# Patient Record
Sex: FEMALE | URBAN - METROPOLITAN AREA
[De-identification: names, ages, dates, MRNs, and addresses within clinical notes are randomized per-mention and may not be internally consistent; named-entity substitution may affect disease eponyms.]

---

## 2022-07-25 ENCOUNTER — INPATIENT HOSPITAL (OUTPATIENT)
Dept: URBAN - METROPOLITAN AREA HOSPITAL 104 | Facility: HOSPITAL | Age: 72
End: 2022-07-25
Payer: MEDICARE

## 2022-07-25 DIAGNOSIS — D50.0 IRON DEFICIENCY ANEMIA SECONDARY TO BLOOD LOSS (CHRONIC): ICD-10-CM

## 2022-07-25 DIAGNOSIS — K59.00 CONSTIPATION, UNSPECIFIED: ICD-10-CM

## 2022-07-25 DIAGNOSIS — K92.0 HEMATEMESIS: ICD-10-CM

## 2022-07-25 DIAGNOSIS — R93.3 ABNORMAL FINDINGS ON DIAGNOSTIC IMAGING OF OTHER PARTS OF DI: ICD-10-CM

## 2022-07-25 PROCEDURE — 99222 1ST HOSP IP/OBS MODERATE 55: CPT | Performed by: INTERNAL MEDICINE

## 2022-11-29 ENCOUNTER — INPATIENT HOSPITAL (OUTPATIENT)
Dept: URBAN - METROPOLITAN AREA HOSPITAL 56 | Facility: HOSPITAL | Age: 72
End: 2022-11-29
Payer: MEDICARE

## 2022-11-29 DIAGNOSIS — Z98.84 BARIATRIC SURGERY STATUS: ICD-10-CM

## 2022-11-29 DIAGNOSIS — K92.0 HEMATEMESIS: ICD-10-CM

## 2022-11-29 DIAGNOSIS — K31.1 ADULT HYPERTROPHIC PYLORIC STENOSIS: ICD-10-CM

## 2022-11-29 DIAGNOSIS — K22.10 ULCER OF ESOPHAGUS WITHOUT BLEEDING: ICD-10-CM

## 2022-11-29 PROCEDURE — 99222 1ST HOSP IP/OBS MODERATE 55: CPT | Mod: 25 | Performed by: INTERNAL MEDICINE

## 2022-11-29 PROCEDURE — 43245 EGD DILATE STRICTURE: CPT | Performed by: INTERNAL MEDICINE

## 2022-11-30 ENCOUNTER — INPATIENT HOSPITAL (OUTPATIENT)
Dept: URBAN - METROPOLITAN AREA HOSPITAL 56 | Facility: HOSPITAL | Age: 72
End: 2022-11-30
Payer: MEDICARE

## 2022-11-30 DIAGNOSIS — K31.1 ADULT HYPERTROPHIC PYLORIC STENOSIS: ICD-10-CM

## 2022-11-30 DIAGNOSIS — Z98.84 BARIATRIC SURGERY STATUS: ICD-10-CM

## 2022-11-30 DIAGNOSIS — K22.10 ULCER OF ESOPHAGUS WITHOUT BLEEDING: ICD-10-CM

## 2022-11-30 DIAGNOSIS — D50.0 IRON DEFICIENCY ANEMIA SECONDARY TO BLOOD LOSS (CHRONIC): ICD-10-CM

## 2022-11-30 PROCEDURE — 99232 SBSQ HOSP IP/OBS MODERATE 35: CPT | Performed by: PHYSICIAN ASSISTANT

## 2022-12-01 PROCEDURE — 99232 SBSQ HOSP IP/OBS MODERATE 35: CPT | Performed by: PHYSICIAN ASSISTANT

## 2022-12-02 ENCOUNTER — INPATIENT HOSPITAL (OUTPATIENT)
Dept: URBAN - METROPOLITAN AREA HOSPITAL 56 | Facility: HOSPITAL | Age: 72
End: 2022-12-02
Payer: MEDICARE

## 2022-12-02 DIAGNOSIS — Z98.84 BARIATRIC SURGERY STATUS: ICD-10-CM

## 2022-12-02 DIAGNOSIS — K22.10 ULCER OF ESOPHAGUS WITHOUT BLEEDING: ICD-10-CM

## 2022-12-02 DIAGNOSIS — D50.0 IRON DEFICIENCY ANEMIA SECONDARY TO BLOOD LOSS (CHRONIC): ICD-10-CM

## 2022-12-02 DIAGNOSIS — K31.1 ADULT HYPERTROPHIC PYLORIC STENOSIS: ICD-10-CM

## 2022-12-02 PROCEDURE — 99232 SBSQ HOSP IP/OBS MODERATE 35: CPT | Performed by: PHYSICIAN ASSISTANT

## 2023-01-01 ENCOUNTER — APPOINTMENT (OUTPATIENT)
Dept: GENERAL RADIOLOGY | Age: 73
DRG: 871 | End: 2023-01-01
Payer: MEDICARE

## 2023-01-01 ENCOUNTER — APPOINTMENT (OUTPATIENT)
Dept: CT IMAGING | Age: 73
DRG: 871 | End: 2023-01-01
Payer: MEDICARE

## 2023-01-01 ENCOUNTER — HOSPITAL ENCOUNTER (INPATIENT)
Age: 73
LOS: 4 days | DRG: 871 | End: 2023-01-14
Attending: EMERGENCY MEDICINE
Payer: MEDICARE

## 2023-01-01 ENCOUNTER — APPOINTMENT (OUTPATIENT)
Dept: ULTRASOUND IMAGING | Age: 73
DRG: 871 | End: 2023-01-01
Payer: MEDICARE

## 2023-01-01 VITALS
OXYGEN SATURATION: 65 % | TEMPERATURE: 99.9 F | HEIGHT: 64 IN | SYSTOLIC BLOOD PRESSURE: 102 MMHG | BODY MASS INDEX: 21.17 KG/M2 | HEART RATE: 84 BPM | RESPIRATION RATE: 9 BRPM | WEIGHT: 124 LBS | DIASTOLIC BLOOD PRESSURE: 68 MMHG

## 2023-01-01 DIAGNOSIS — K92.2 UPPER GI BLEED: Primary | ICD-10-CM

## 2023-01-01 DIAGNOSIS — E87.20 METABOLIC ACIDOSIS: ICD-10-CM

## 2023-01-01 DIAGNOSIS — R65.10 SIRS (SYSTEMIC INFLAMMATORY RESPONSE SYNDROME) (HCC): ICD-10-CM

## 2023-01-01 DIAGNOSIS — J90 PLEURAL EFFUSION, BILATERAL: ICD-10-CM

## 2023-01-01 DIAGNOSIS — K31.89 GASTRIC MASS: ICD-10-CM

## 2023-01-01 DIAGNOSIS — R09.02 HYPOXIA: ICD-10-CM

## 2023-01-01 DIAGNOSIS — J90 PLEURAL EFFUSION: ICD-10-CM

## 2023-01-01 DIAGNOSIS — I48.91 ATRIAL FIBRILLATION WITH RAPID VENTRICULAR RESPONSE (HCC): ICD-10-CM

## 2023-01-01 LAB
ABO/RH: NORMAL
ABO/RH: NORMAL
ACANTHOCYTES: ABNORMAL
ADENOVIRUS DETECTION BY PCR: NOT DETECTED
ALBUMIN SERPL-MCNC: 1.9 GM/DL (ref 3.4–5)
ALBUMIN SERPL-MCNC: 2 GM/DL (ref 3.4–5)
ALBUMIN SERPL-MCNC: 2.5 GM/DL (ref 3.4–5)
ALBUMIN SERPL-MCNC: 2.9 GM/DL (ref 3.4–5)
ALBUMIN SERPL-MCNC: 3 GM/DL (ref 3.4–5)
ALP BLD-CCNC: 103 IU/L (ref 40–129)
ALP BLD-CCNC: 121 IU/L (ref 40–129)
ALP BLD-CCNC: 72 IU/L (ref 40–128)
ALP BLD-CCNC: 73 IU/L (ref 40–128)
ALP BLD-CCNC: 84 IU/L (ref 40–128)
ALT SERPL-CCNC: 29 U/L (ref 10–40)
ALT SERPL-CCNC: 37 U/L (ref 10–40)
ALT SERPL-CCNC: 38 U/L (ref 10–40)
ALT SERPL-CCNC: 564 U/L (ref 10–40)
ALT SERPL-CCNC: 81 U/L (ref 10–40)
AMORPHOUS: ABNORMAL /HPF
AMYLASE: 105 U/L (ref 25–115)
AMYLASE: 56 U/L (ref 25–115)
ANION GAP SERPL CALCULATED.3IONS-SCNC: 15 MMOL/L (ref 4–16)
ANION GAP SERPL CALCULATED.3IONS-SCNC: 21 MMOL/L (ref 4–16)
ANION GAP SERPL CALCULATED.3IONS-SCNC: 24 MMOL/L (ref 4–16)
ANION GAP SERPL CALCULATED.3IONS-SCNC: 24 MMOL/L (ref 4–16)
ANION GAP SERPL CALCULATED.3IONS-SCNC: 27 MMOL/L (ref 4–16)
ANION GAP SERPL CALCULATED.3IONS-SCNC: 28 MMOL/L (ref 4–16)
ANION GAP SERPL CALCULATED.3IONS-SCNC: 31 MMOL/L (ref 4–16)
ANISOCYTOSIS: ABNORMAL
ANTIBODY SCREEN: NEGATIVE
ANTIBODY SCREEN: NEGATIVE
APTT: 25.2 SECONDS (ref 25.1–37.1)
APTT: 29.9 SECONDS (ref 25.1–37.1)
APTT: 32.6 SECONDS (ref 25.1–37.1)
APTT: 43.6 SECONDS (ref 25.1–37.1)
APTT: 44 SECONDS (ref 25.1–37.1)
AST SERPL-CCNC: 197 IU/L (ref 15–37)
AST SERPL-CCNC: 2004 IU/L (ref 15–37)
AST SERPL-CCNC: 26 IU/L (ref 15–37)
AST SERPL-CCNC: 44 IU/L (ref 15–37)
AST SERPL-CCNC: 71 IU/L (ref 15–37)
BACTERIA: ABNORMAL /HPF
BACTERIA: NEGATIVE /HPF
BANDED NEUTROPHILS ABSOLUTE COUNT: 2.58 K/CU MM
BANDED NEUTROPHILS ABSOLUTE COUNT: 5.33 K/CU MM
BANDED NEUTROPHILS RELATIVE PERCENT: 14 % (ref 5–11)
BANDED NEUTROPHILS RELATIVE PERCENT: 23 % (ref 5–11)
BASE EXCESS MIXED: 11 (ref 0–2.3)
BASE EXCESS MIXED: 12.1 (ref 0–2.3)
BASE EXCESS MIXED: 12.7 (ref 0–2.3)
BASE EXCESS MIXED: 12.8 (ref 0–2.3)
BASE EXCESS MIXED: 4.5 (ref 0–2.3)
BASE EXCESS MIXED: 8.4 (ref 0–2.3)
BASE EXCESS MIXED: 9.2 (ref 0–2.3)
BASE EXCESS: 10 (ref 0–2.4)
BASE EXCESS: 11 (ref 0–2.4)
BASE EXCESS: 12 (ref 0–2.4)
BASE EXCESS: 14 (ref 0–2.4)
BASE EXCESS: 17 (ref 0–2.4)
BASE EXCESS: 3 (ref 0–2.4)
BASE EXCESS: 9 (ref 0–2.4)
BASE EXCESS: ABNORMAL (ref 0–2.4)
BASOPHILS ABSOLUTE: 0 K/CU MM
BASOPHILS ABSOLUTE: 0 K/CU MM
BASOPHILS RELATIVE PERCENT: 0.1 % (ref 0–1)
BASOPHILS RELATIVE PERCENT: 0.1 % (ref 0–1)
BILIRUB SERPL-MCNC: 0.4 MG/DL (ref 0–1)
BILIRUB SERPL-MCNC: 0.4 MG/DL (ref 0–1)
BILIRUB SERPL-MCNC: 0.5 MG/DL (ref 0–1)
BILIRUB SERPL-MCNC: 0.6 MG/DL (ref 0–1)
BILIRUB SERPL-MCNC: 1.4 MG/DL (ref 0–1)
BILIRUBIN DIRECT: 0.4 MG/DL (ref 0–0.3)
BILIRUBIN DIRECT: 1.1 MG/DL (ref 0–0.3)
BILIRUBIN URINE: ABNORMAL MG/DL
BILIRUBIN URINE: NEGATIVE MG/DL
BILIRUBIN, INDIRECT: 0.2 MG/DL (ref 0–0.7)
BILIRUBIN, INDIRECT: 0.3 MG/DL (ref 0–0.7)
BLOOD, URINE: ABNORMAL
BLOOD, URINE: ABNORMAL
BORDETELLA PARAPERTUSSIS BY PCR: NOT DETECTED
BORDETELLA PERTUSSIS PCR: NOT DETECTED
BUN BLDV-MCNC: 13 MG/DL (ref 6–23)
BUN BLDV-MCNC: 15 MG/DL (ref 6–23)
BUN BLDV-MCNC: 15 MG/DL (ref 6–23)
BUN BLDV-MCNC: 4 MG/DL (ref 6–23)
BUN BLDV-MCNC: 4 MG/DL (ref 6–23)
BUN BLDV-MCNC: 5 MG/DL (ref 6–23)
BUN BLDV-MCNC: 8 MG/DL (ref 6–23)
BURR CELLS: ABNORMAL
C-REACTIVE PROTEIN, HIGH SENSITIVITY: 105 MG/L
C-REACTIVE PROTEIN, HIGH SENSITIVITY: 152.7 MG/L
CALCIUM OXALATE CRYSTALS: ABNORMAL /HPF
CALCIUM SERPL-MCNC: 7.3 MG/DL (ref 8.3–10.6)
CALCIUM SERPL-MCNC: 7.7 MG/DL (ref 8.3–10.6)
CALCIUM SERPL-MCNC: 8.5 MG/DL (ref 8.3–10.6)
CALCIUM SERPL-MCNC: 8.7 MG/DL (ref 8.3–10.6)
CALCIUM SERPL-MCNC: 8.9 MG/DL (ref 8.3–10.6)
CALCIUM SERPL-MCNC: 8.9 MG/DL (ref 8.3–10.6)
CALCIUM SERPL-MCNC: 9.7 MG/DL (ref 8.3–10.6)
CARBON MONOXIDE, BLOOD: 1.4 % (ref 0–5)
CARBON MONOXIDE, BLOOD: 1.8 % (ref 0–5)
CARBON MONOXIDE, BLOOD: 2.1 % (ref 0–5)
CARBON MONOXIDE, BLOOD: 2.5 % (ref 0–5)
CARBON MONOXIDE, BLOOD: 2.5 % (ref 0–5)
CHLAMYDOPHILA PNEUMONIA PCR: NOT DETECTED
CHLORIDE BLD-SCNC: 100 MMOL/L (ref 99–110)
CHLORIDE BLD-SCNC: 101 MMOL/L (ref 99–110)
CHLORIDE BLD-SCNC: 107 MMOL/L (ref 99–110)
CHLORIDE BLD-SCNC: 108 MMOL/L (ref 99–110)
CHLORIDE BLD-SCNC: 111 MMOL/L (ref 99–110)
CHLORIDE BLD-SCNC: 94 MMOL/L (ref 99–110)
CHLORIDE BLD-SCNC: 98 MMOL/L (ref 99–110)
CLARITY: ABNORMAL
CLARITY: ABNORMAL
CO2 CONTENT: 12.5 MMOL/L (ref 19–24)
CO2 CONTENT: 15.3 MMOL/L (ref 19–24)
CO2 CONTENT: 15.7 MMOL/L (ref 19–24)
CO2 CONTENT: 15.9 MMOL/L (ref 19–24)
CO2 CONTENT: 16 MMOL/L (ref 19–24)
CO2 CONTENT: 16.2 MMOL/L (ref 19–24)
CO2 CONTENT: 19.8 MMOL/L (ref 19–24)
CO2: 11 MMOL/L (ref 21–32)
CO2: 15 MMOL/L (ref 21–32)
CO2: 15 MMOL/L (ref 21–32)
CO2: 17 MMOL/L (ref 21–32)
CO2: 17 MMOL/L (ref 21–32)
CO2: 18 MMOL/L (ref 21–32)
CO2: 19 MMOL/L (ref 21–32)
CO2: 19 MMOL/L (ref 21–32)
CO2: 21 MMOL/L (ref 21–32)
CO2: 9 MMOL/L (ref 21–32)
COLOR: YELLOW
COLOR: YELLOW
COMMENT: ABNORMAL
CORONAVIRUS 229E PCR: NOT DETECTED
CORONAVIRUS HKU1 PCR: NOT DETECTED
CORONAVIRUS NL63 PCR: NOT DETECTED
CORONAVIRUS OC43 PCR: NOT DETECTED
CREAT SERPL-MCNC: 0.5 MG/DL (ref 0.6–1.1)
CREAT SERPL-MCNC: 0.6 MG/DL (ref 0.6–1.1)
CREAT SERPL-MCNC: 0.9 MG/DL (ref 0.6–1.1)
CREAT SERPL-MCNC: 1 MG/DL (ref 0.6–1.1)
CREAT SERPL-MCNC: 1.4 MG/DL (ref 0.6–1.1)
CULTURE: ABNORMAL
CULTURE: NORMAL
DIFFERENTIAL TYPE: ABNORMAL
DOHLE BODIES: PRESENT
DOSE AMOUNT: NORMAL
DOSE TIME: NORMAL
EGFR, POC: 29 ML/MIN/1.73M2
EGFR, POC: 37 ML/MIN/1.73M2
EGFR, POC: 53 ML/MIN/1.73M2
EGFR, POC: 59 ML/MIN/1.73M2
EGFR, POC: ABNORMAL ML/MIN/1.73M2
EKG ATRIAL RATE: 300 BPM
EKG DIAGNOSIS: NORMAL
EKG DIAGNOSIS: NORMAL
EKG Q-T INTERVAL: 326 MS
EKG Q-T INTERVAL: 334 MS
EKG QRS DURATION: 84 MS
EKG QRS DURATION: 92 MS
EKG QTC CALCULATION (BAZETT): 493 MS
EKG QTC CALCULATION (BAZETT): 513 MS
EKG R AXIS: -41 DEGREES
EKG R AXIS: 215 DEGREES
EKG T AXIS: 150 DEGREES
EKG T AXIS: 202 DEGREES
EKG VENTRICULAR RATE: 131 BPM
EKG VENTRICULAR RATE: 149 BPM
EOSINOPHILS ABSOLUTE: 0 K/CU MM
EOSINOPHILS ABSOLUTE: 0 K/CU MM
EOSINOPHILS RELATIVE PERCENT: 0 % (ref 0–3)
EOSINOPHILS RELATIVE PERCENT: 0 % (ref 0–3)
ERYTHROCYTE SEDIMENTATION RATE: 3 MM/HR (ref 0–30)
FLUID TYPE: NORMAL INDEX
FLUID TYPE: NORMAL INDEX
GFR SERPL CREATININE-BSD FRML MDRD: 40 ML/MIN/1.73M2
GFR SERPL CREATININE-BSD FRML MDRD: 59 ML/MIN/1.73M2
GFR SERPL CREATININE-BSD FRML MDRD: >60 ML/MIN/1.73M2
GLUCOSE BLD-MCNC: 109 MG/DL (ref 70–99)
GLUCOSE BLD-MCNC: 111 MG/DL (ref 70–99)
GLUCOSE BLD-MCNC: 112 MG/DL (ref 70–99)
GLUCOSE BLD-MCNC: 112 MG/DL (ref 70–99)
GLUCOSE BLD-MCNC: 115 MG/DL (ref 70–99)
GLUCOSE BLD-MCNC: 117 MG/DL (ref 70–99)
GLUCOSE BLD-MCNC: 119 MG/DL (ref 70–99)
GLUCOSE BLD-MCNC: 119 MG/DL (ref 70–99)
GLUCOSE BLD-MCNC: 121 MG/DL (ref 70–99)
GLUCOSE BLD-MCNC: 123 MG/DL (ref 70–99)
GLUCOSE BLD-MCNC: 130 MG/DL (ref 70–99)
GLUCOSE BLD-MCNC: 143 MG/DL (ref 70–99)
GLUCOSE BLD-MCNC: 146 MG/DL (ref 70–99)
GLUCOSE BLD-MCNC: 179 MG/DL (ref 70–99)
GLUCOSE BLD-MCNC: 180 MG/DL (ref 70–99)
GLUCOSE BLD-MCNC: 198 MG/DL (ref 70–99)
GLUCOSE BLD-MCNC: 74 MG/DL (ref 70–99)
GLUCOSE BLD-MCNC: 76 MG/DL (ref 70–99)
GLUCOSE, FLUID: 153 MG/DL
GLUCOSE, FLUID: 157 MG/DL
GLUCOSE, URINE: NEGATIVE MG/DL
GLUCOSE, URINE: NEGATIVE MG/DL
HCO3 ARTERIAL: 11.4 MMOL/L (ref 18–23)
HCO3 ARTERIAL: 14.2 MMOL/L (ref 18–23)
HCO3 ARTERIAL: 14.6 MMOL/L (ref 18–23)
HCO3 ARTERIAL: 14.7 MMOL/L (ref 18–23)
HCO3 ARTERIAL: 15.1 MMOL/L (ref 18–23)
HCO3 ARTERIAL: 15.2 MMOL/L (ref 18–23)
HCO3 ARTERIAL: 15.3 MMOL/L (ref 18–23)
HCO3 ARTERIAL: 15.3 MMOL/L (ref 18–23)
HCO3 ARTERIAL: 16.3 MMOL/L (ref 18–23)
HCO3 ARTERIAL: 17.5 MMOL/L (ref 18–23)
HCO3 ARTERIAL: 17.8 MMOL/L (ref 18–23)
HCO3 ARTERIAL: 19 MMOL/L (ref 18–23)
HCO3 VENOUS: 11.3 MMOL/L (ref 19–25)
HCO3 VENOUS: 21.3 MMOL/L (ref 19–25)
HCT VFR BLD CALC: 20 % (ref 37–47)
HCT VFR BLD CALC: 21.1 % (ref 37–47)
HCT VFR BLD CALC: 22 % (ref 37–47)
HCT VFR BLD CALC: 24 % (ref 37–47)
HCT VFR BLD CALC: 26 % (ref 37–47)
HCT VFR BLD CALC: 27.2 % (ref 37–47)
HCT VFR BLD CALC: 27.3 % (ref 37–47)
HCT VFR BLD CALC: 28 % (ref 37–47)
HCT VFR BLD CALC: 28.7 % (ref 37–47)
HCT VFR BLD CALC: 29 % (ref 37–47)
HCT VFR BLD CALC: 29 % (ref 37–47)
HCT VFR BLD CALC: 30.6 % (ref 37–47)
HCT VFR BLD CALC: 34.5 % (ref 37–47)
HCT VFR BLD CALC: 36.2 % (ref 37–47)
HEMOGLOBIN: 10 GM/DL (ref 12.5–16)
HEMOGLOBIN: 10.6 GM/DL (ref 12.5–16)
HEMOGLOBIN: 11.3 GM/DL (ref 12.5–16)
HEMOGLOBIN: 6.2 GM/DL (ref 12.5–16)
HEMOGLOBIN: 6.8 GM/DL (ref 12.5–16)
HEMOGLOBIN: 7.6 GM/DL (ref 12.5–16)
HEMOGLOBIN: 8 GM/DL (ref 12.5–16)
HEMOGLOBIN: 8.6 GM/DL (ref 12.5–16)
HEMOGLOBIN: 8.6 GM/DL (ref 12.5–16)
HEMOGLOBIN: 8.8 GM/DL (ref 12.5–16)
HEMOGLOBIN: 8.9 GM/DL (ref 12.5–16)
HEMOGLOBIN: 9 GM/DL (ref 12.5–16)
HEMOGLOBIN: 9.4 GM/DL (ref 12.5–16)
HEMOGLOBIN: 9.4 GM/DL (ref 12.5–16)
HUMAN METAPNEUMOVIRUS PCR: NOT DETECTED
HYALINE CASTS: 0 /LPF
HYPHENATED YEAST: ABNORMAL /HPF
IMMATURE NEUTROPHIL %: 1.1 % (ref 0–0.43)
IMMATURE NEUTROPHIL %: 1.4 % (ref 0–0.43)
INFLUENZA A BY PCR: NOT DETECTED
INFLUENZA A H1 (2009) PCR: NOT DETECTED
INFLUENZA A H1 PANDEMIC PCR: NOT DETECTED
INFLUENZA A H3 PCR: NOT DETECTED
INFLUENZA B BY PCR: NOT DETECTED
INR BLD: 0.93 INDEX
INR BLD: 0.93 INDEX
INR BLD: 1.02 INDEX
INR BLD: 2.44 INDEX
INR BLD: 3.09 INDEX
KETONES, URINE: 15 MG/DL
KETONES, URINE: >80 MG/DL
LACTATE DEHYDROGENASE, FLUID: 171 IU/L
LACTATE DEHYDROGENASE, FLUID: 210 IU/L
LACTATE DEHYDROGENASE: 572 IU/L (ref 120–246)
LACTATE: 1.4 MMOL/L (ref 0.5–1.9)
LACTATE: 12.3 MMOL/L (ref 0.5–1.9)
LACTATE: 13.1 MMOL/L (ref 0.5–1.9)
LACTATE: 13.6 MMOL/L (ref 0.5–1.9)
LACTATE: 17.3 MMOL/L (ref 0.5–1.9)
LACTATE: 2.5 MMOL/L (ref 0.5–1.9)
LACTIC ACID, SEPSIS: 1.4 MMOL/L (ref 0.5–1.9)
LEGIONELLA URINARY AG: NEGATIVE
LEUKOCYTE ESTERASE, URINE: ABNORMAL
LEUKOCYTE ESTERASE, URINE: ABNORMAL
LIPASE: 24 IU/L (ref 13–60)
LIPASE: 26 IU/L (ref 13–60)
LIPASE: 46 IU/L (ref 13–60)
LV EF: 58 %
LVEF MODALITY: NORMAL
LYMPHOCYTES ABSOLUTE: 0.4 K/CU MM
LYMPHOCYTES ABSOLUTE: 0.8 K/CU MM
LYMPHOCYTES ABSOLUTE: 1.3 K/CU MM
LYMPHOCYTES ABSOLUTE: 1.5 K/CU MM
LYMPHOCYTES RELATIVE PERCENT: 12 % (ref 24–44)
LYMPHOCYTES RELATIVE PERCENT: 2 % (ref 24–44)
LYMPHOCYTES RELATIVE PERCENT: 2.6 % (ref 24–44)
LYMPHOCYTES RELATIVE PERCENT: 4 % (ref 24–44)
LYMPHOCYTES, BODY FLUID: 4 %
LYMPHOCYTES, BODY FLUID: 9 %
Lab: ABNORMAL
Lab: ABNORMAL
Lab: NORMAL
MAGNESIUM: 1.6 MG/DL (ref 1.8–2.4)
MAGNESIUM: 1.8 MG/DL (ref 1.8–2.4)
MAGNESIUM: 2 MG/DL (ref 1.8–2.4)
MAGNESIUM: 2 MG/DL (ref 1.8–2.4)
MAGNESIUM: 2.2 MG/DL (ref 1.8–2.4)
MCH RBC QN AUTO: 29.5 PG (ref 27–31)
MCH RBC QN AUTO: 29.5 PG (ref 27–31)
MCH RBC QN AUTO: 29.9 PG (ref 27–31)
MCH RBC QN AUTO: 29.9 PG (ref 27–31)
MCH RBC QN AUTO: 30.3 PG (ref 27–31)
MCHC RBC AUTO-ENTMCNC: 29.4 % (ref 32–36)
MCHC RBC AUTO-ENTMCNC: 29.7 % (ref 32–36)
MCHC RBC AUTO-ENTMCNC: 30.7 % (ref 32–36)
MCHC RBC AUTO-ENTMCNC: 31.2 % (ref 32–36)
MCHC RBC AUTO-ENTMCNC: 31.6 % (ref 32–36)
MCV RBC AUTO: 100.5 FL (ref 78–100)
MCV RBC AUTO: 94.4 FL (ref 78–100)
MCV RBC AUTO: 97.1 FL (ref 78–100)
MCV RBC AUTO: 97.5 FL (ref 78–100)
MCV RBC AUTO: 99.3 FL (ref 78–100)
MESOTHELIAL FLUID: 22 /100 WBC
MESOTHELIAL FLUID: 29 /100 WBC
METAMYELOCYTES ABSOLUTE COUNT: 0.11 K/CU MM
METAMYELOCYTES PERCENT: 1 %
METHEMOGLOBIN ARTERIAL: 1 %
METHEMOGLOBIN ARTERIAL: 1.3 %
MONOCYTE, FLUID: NORMAL %
MONOCYTE, FLUID: NORMAL %
MONOCYTES ABSOLUTE: 0.6 K/CU MM
MONOCYTES ABSOLUTE: 1.3 K/CU MM
MONOCYTES ABSOLUTE: 2.3 K/CU MM
MONOCYTES RELATIVE PERCENT: 3 % (ref 0–4)
MONOCYTES RELATIVE PERCENT: 3.9 % (ref 0–4)
MONOCYTES RELATIVE PERCENT: 6 % (ref 0–4)
MUCUS: ABNORMAL HPF
MYCOPLASMA PNEUMONIAE PCR: NOT DETECTED
MYELOCYTE PERCENT: 1 %
MYELOCYTE PERCENT: 1 %
MYELOCYTES ABSOLUTE COUNT: 0.11 K/CU MM
MYELOCYTES ABSOLUTE COUNT: 0.38 K/CU MM
NEUTROPHIL, FLUID: 42 %
NEUTROPHIL, FLUID: 48 %
NITRITE URINE, QUANTITATIVE: NEGATIVE
NITRITE URINE, QUANTITATIVE: NEGATIVE
NUCLEATED RBC %: 0 %
NUCLEATED RBC %: 0.1 %
NUCLEATED RED BLOOD CELLS: 6
O2 SAT, VEN: 93.4 % (ref 50–70)
O2 SAT, VEN: 95.5 % (ref 50–70)
O2 SATURATION: 64.2 % (ref 96–97)
O2 SATURATION: 70.1 % (ref 96–97)
O2 SATURATION: 80.4 % (ref 96–97)
O2 SATURATION: 82 % (ref 96–97)
O2 SATURATION: 86.1 % (ref 96–97)
O2 SATURATION: 91.8 % (ref 96–97)
O2 SATURATION: 96.9 % (ref 96–97)
O2 SATURATION: 97.4 % (ref 96–97)
O2 SATURATION: 97.4 % (ref 96–97)
O2 SATURATION: 98 % (ref 96–97)
O2 SATURATION: 98.4 % (ref 96–97)
O2 SATURATION: 99.7 % (ref 96–97)
OTHER CELLS FLUID: NORMAL
OTHER CELLS FLUID: NORMAL
PARAINFLUENZA 1 PCR: NOT DETECTED
PARAINFLUENZA 2 PCR: NOT DETECTED
PARAINFLUENZA 3 PCR: NOT DETECTED
PARAINFLUENZA 4 PCR: NOT DETECTED
PCO2 ARTERIAL: 28.6 MMHG (ref 32–45)
PCO2 ARTERIAL: 29 MMHG (ref 32–45)
PCO2 ARTERIAL: 30 MMHG (ref 32–45)
PCO2 ARTERIAL: 32 MMHG (ref 32–45)
PCO2 ARTERIAL: 36 MMHG (ref 32–45)
PCO2 ARTERIAL: 36.3 MMHG (ref 32–45)
PCO2 ARTERIAL: 36.3 MMHG (ref 32–45)
PCO2 ARTERIAL: 40.2 MMHG (ref 32–45)
PCO2 ARTERIAL: 40.8 MMHG (ref 32–45)
PCO2 ARTERIAL: 42 MMHG (ref 32–45)
PCO2 ARTERIAL: 42.9 MMHG (ref 32–45)
PCO2 ARTERIAL: 43.5 MMHG (ref 32–45)
PCO2, VEN: 21 MMHG (ref 38–52)
PCO2, VEN: 36 MMHG (ref 38–52)
PDW BLD-RTO: 17.3 % (ref 11.7–14.9)
PDW BLD-RTO: 17.5 % (ref 11.7–14.9)
PDW BLD-RTO: 17.8 % (ref 11.7–14.9)
PDW BLD-RTO: 17.9 % (ref 11.7–14.9)
PDW BLD-RTO: 18.1 % (ref 11.7–14.9)
PH BLOOD: 7.11 (ref 7.34–7.45)
PH BLOOD: 7.15 (ref 7.34–7.45)
PH BLOOD: 7.15 (ref 7.34–7.45)
PH BLOOD: 7.19 (ref 7.34–7.45)
PH BLOOD: 7.2 (ref 7.34–7.45)
PH BLOOD: 7.21 (ref 7.34–7.45)
PH BLOOD: 7.23 (ref 7.34–7.45)
PH BLOOD: 7.27 (ref 7.34–7.45)
PH BLOOD: 7.29 (ref 7.34–7.45)
PH BLOOD: 7.31 (ref 7.34–7.45)
PH BLOOD: 7.33 (ref 7.34–7.45)
PH BLOOD: 7.43 (ref 7.34–7.45)
PH FLUID: 7.5
PH VENOUS: 7.34 (ref 7.32–7.42)
PH VENOUS: 7.38 (ref 7.32–7.42)
PH, URINE: 5.5 (ref 5–8)
PH, URINE: 6 (ref 5–8)
PHOSPHORUS: 3.5 MG/DL (ref 2.5–4.9)
PHOSPHORUS: 4 MG/DL (ref 2.5–4.9)
PHOSPHORUS: 4.1 MG/DL (ref 2.5–4.9)
PLATELET # BLD: 129 K/CU MM (ref 140–440)
PLATELET # BLD: 281 K/CU MM (ref 140–440)
PLATELET # BLD: 285 K/CU MM (ref 140–440)
PLATELET # BLD: 352 K/CU MM (ref 140–440)
PLATELET # BLD: 410 K/CU MM (ref 140–440)
PMV BLD AUTO: 10.6 FL (ref 7.5–11.1)
PMV BLD AUTO: 10.8 FL (ref 7.5–11.1)
PMV BLD AUTO: 11 FL (ref 7.5–11.1)
PMV BLD AUTO: 11 FL (ref 7.5–11.1)
PMV BLD AUTO: 11.9 FL (ref 7.5–11.1)
PO2 ARTERIAL: 117.3 MMHG (ref 75–100)
PO2 ARTERIAL: 124.3 MMHG (ref 75–100)
PO2 ARTERIAL: 135.1 MMHG (ref 75–100)
PO2 ARTERIAL: 181 MMHG (ref 75–100)
PO2 ARTERIAL: 184 MMHG (ref 75–100)
PO2 ARTERIAL: 216 MMHG (ref 75–100)
PO2 ARTERIAL: 42.5 MMHG (ref 75–100)
PO2 ARTERIAL: 43.5 MMHG (ref 75–100)
PO2 ARTERIAL: 44.1 MMHG (ref 75–100)
PO2 ARTERIAL: 55 MMHG (ref 75–100)
PO2 ARTERIAL: 64 MMHG (ref 75–100)
PO2 ARTERIAL: 64 MMHG (ref 75–100)
PO2, VEN: 200 MMHG (ref 28–48)
PO2, VEN: 79 MMHG (ref 28–48)
POC CALCIUM: 1.02 MMOL/L (ref 1.12–1.32)
POC CALCIUM: 1.04 MMOL/L (ref 1.12–1.32)
POC CALCIUM: 1.05 MMOL/L (ref 1.12–1.32)
POC CALCIUM: 1.12 MMOL/L (ref 1.12–1.32)
POC CALCIUM: 1.39 MMOL/L (ref 1.12–1.32)
POC CALCIUM: 1.39 MMOL/L (ref 1.12–1.32)
POC CALCIUM: 1.45 MMOL/L (ref 1.12–1.32)
POC CHLORIDE: 102 MMOL/L (ref 98–109)
POC CHLORIDE: 104 MMOL/L (ref 98–109)
POC CHLORIDE: 113 MMOL/L (ref 98–109)
POC CHLORIDE: 114 MMOL/L (ref 98–109)
POC CREATININE: 1 MG/DL (ref 0.6–1.1)
POC CREATININE: 1.1 MG/DL (ref 0.6–1.1)
POC CREATININE: 1.5 MG/DL (ref 0.6–1.1)
POC CREATININE: 1.8 MG/DL (ref 0.6–1.1)
POLYCHROMASIA: ABNORMAL
POTASSIUM SERPL-SCNC: 3.2 MMOL/L (ref 3.5–5.1)
POTASSIUM SERPL-SCNC: 3.3 MMOL/L (ref 3.5–5.1)
POTASSIUM SERPL-SCNC: 3.3 MMOL/L (ref 3.5–5.1)
POTASSIUM SERPL-SCNC: 3.6 MMOL/L (ref 3.5–4.5)
POTASSIUM SERPL-SCNC: 3.6 MMOL/L (ref 3.5–4.5)
POTASSIUM SERPL-SCNC: 4 MMOL/L (ref 3.5–4.5)
POTASSIUM SERPL-SCNC: 4.1 MMOL/L (ref 3.5–5.1)
POTASSIUM SERPL-SCNC: 4.1 MMOL/L (ref 3.5–5.1)
POTASSIUM SERPL-SCNC: 4.2 MMOL/L (ref 3.5–5.1)
POTASSIUM SERPL-SCNC: 4.2 MMOL/L (ref 3.5–5.1)
POTASSIUM SERPL-SCNC: 4.4 MMOL/L (ref 3.5–4.5)
POTASSIUM SERPL-SCNC: 4.4 MMOL/L (ref 3.5–4.5)
POTASSIUM SERPL-SCNC: 4.4 MMOL/L (ref 3.5–5.1)
POTASSIUM SERPL-SCNC: 4.5 MMOL/L (ref 3.5–4.5)
POTASSIUM SERPL-SCNC: 4.5 MMOL/L (ref 3.5–4.5)
POTASSIUM SERPL-SCNC: 4.5 MMOL/L (ref 3.5–5.1)
PRO-BNP: 3035 PG/ML
PROCALCITONIN: 0.13
PROCALCITONIN: 0.15
PROCALCITONIN: 1.11
PROTEIN FLUID: 1.3 GM/DL
PROTEIN FLUID: 1.4 GM/DL
PROTEIN UA: 30 MG/DL
PROTEIN UA: ABNORMAL MG/DL
PROTHROMBIN TIME: 12 SECONDS (ref 11.7–14.5)
PROTHROMBIN TIME: 12 SECONDS (ref 11.7–14.5)
PROTHROMBIN TIME: 13.1 SECONDS (ref 11.7–14.5)
PROTHROMBIN TIME: 31.8 SECONDS (ref 11.7–14.5)
PROTHROMBIN TIME: 40.3 SECONDS (ref 11.7–14.5)
RBC # BLD: 2.1 M/CU MM (ref 4.2–5.4)
RBC # BLD: 2.88 M/CU MM (ref 4.2–5.4)
RBC # BLD: 2.92 M/CU MM (ref 4.2–5.4)
RBC # BLD: 3.14 M/CU MM (ref 4.2–5.4)
RBC # BLD: 3.73 M/CU MM (ref 4.2–5.4)
RBC # BLD: ABNORMAL 10*6/UL
RBC # BLD: ABNORMAL 10*6/UL
RBC FLUID: 278 /CU MM
RBC FLUID: NORMAL /CU MM
RBC URINE: 13 /HPF (ref 0–6)
RBC URINE: ABNORMAL /HPF (ref 0–6)
REASON FOR REJECTION: NORMAL
REJECTED TEST: NORMAL
RHINOVIRUS ENTEROVIRUS PCR: NOT DETECTED
RSV PCR: NOT DETECTED
SARS-COV-2: ABNORMAL
SEGMENTED NEUTROPHILS ABSOLUTE COUNT: 20.2 K/CU MM
SEGMENTED NEUTROPHILS ABSOLUTE COUNT: 28.6 K/CU MM
SEGMENTED NEUTROPHILS ABSOLUTE COUNT: 29.3 K/CU MM
SEGMENTED NEUTROPHILS ABSOLUTE COUNT: 7.1 K/CU MM
SEGMENTED NEUTROPHILS RELATIVE PERCENT: 63 % (ref 36–66)
SEGMENTED NEUTROPHILS RELATIVE PERCENT: 75 % (ref 36–66)
SEGMENTED NEUTROPHILS RELATIVE PERCENT: 92.3 % (ref 36–66)
SEGMENTED NEUTROPHILS RELATIVE PERCENT: 93.5 % (ref 36–66)
SODIUM BLD-SCNC: 137 MMOL/L (ref 135–145)
SODIUM BLD-SCNC: 137 MMOL/L (ref 138–146)
SODIUM BLD-SCNC: 138 MMOL/L (ref 135–145)
SODIUM BLD-SCNC: 139 MMOL/L (ref 135–145)
SODIUM BLD-SCNC: 139 MMOL/L (ref 135–145)
SODIUM BLD-SCNC: 140 MMOL/L (ref 135–145)
SODIUM BLD-SCNC: 140 MMOL/L (ref 138–146)
SODIUM BLD-SCNC: 141 MMOL/L (ref 138–146)
SODIUM BLD-SCNC: 142 MMOL/L (ref 138–146)
SODIUM BLD-SCNC: 143 MMOL/L (ref 135–145)
SODIUM BLD-SCNC: 145 MMOL/L (ref 138–146)
SODIUM BLD-SCNC: 146 MMOL/L (ref 135–145)
SODIUM BLD-SCNC: 146 MMOL/L (ref 138–146)
SODIUM BLD-SCNC: 149 MMOL/L (ref 138–146)
SOURCE, BLOOD GAS: ABNORMAL
SPECIFIC GRAVITY UA: 1.01 (ref 1–1.03)
SPECIFIC GRAVITY UA: >1.03 (ref 1–1.03)
SPECIMEN: ABNORMAL
SPECIMEN: ABNORMAL
SPECIMEN: NORMAL
STREP PNEUMONIAE ANTIGEN: NORMAL
TOTAL IMMATURE NEUTOROPHIL: 0.3 K/CU MM
TOTAL IMMATURE NEUTOROPHIL: 0.34 K/CU MM
TOTAL NUCLEATED RBC: 0 K/CU MM
TOTAL NUCLEATED RBC: 0 K/CU MM
TOTAL PROTEIN: 3.4 GM/DL (ref 6.4–8.2)
TOTAL PROTEIN: 4 GM/DL (ref 6.4–8.2)
TOTAL PROTEIN: 4.6 GM/DL (ref 6.4–8.2)
TOTAL PROTEIN: 4.8 GM/DL (ref 6.4–8.2)
TOTAL PROTEIN: 5.9 GM/DL (ref 6.4–8.2)
TRICHOMONAS: ABNORMAL /HPF
TRICHOMONAS: ABNORMAL /HPF
TRIGL SERPL-MCNC: 147 MG/DL
TROPONIN T: 0.01 NG/ML
TROPONIN T: 0.02 NG/ML
TROPONIN T: <0.01 NG/ML
TSH HIGH SENSITIVITY: 6.19 UIU/ML (ref 0.27–4.2)
UROBILINOGEN, URINE: 0.2 MG/DL (ref 0.2–1)
UROBILINOGEN, URINE: 0.2 MG/DL (ref 0.2–1)
VANCOMYCIN RANDOM: 31.8 UG/ML
VANCOMYCIN RANDOM: 4.9 UG/ML
VANCOMYCIN RANDOM: 8.8 UG/ML
VANCOMYCIN RANDOM: 9.5 UG/ML
WBC # BLD: 11.2 K/CU MM (ref 4–10.5)
WBC # BLD: 18.6 K/CU MM (ref 4–10.5)
WBC # BLD: 21.6 K/CU MM (ref 4–10.5)
WBC # BLD: 31.7 K/CU MM (ref 4–10.5)
WBC # BLD: 38.1 K/CU MM (ref 4–10.5)
WBC CLUMP: ABNORMAL /HPF
WBC CLUMP: ABNORMAL /HPF
WBC FLUID: 475 /CU MM
WBC FLUID: 778 /CU MM
WBC UA: 104 /HPF (ref 0–5)
WBC UA: 137 /HPF (ref 0–5)
YEAST: ABNORMAL /HPF

## 2023-01-01 PROCEDURE — 82803 BLOOD GASES ANY COMBINATION: CPT

## 2023-01-01 PROCEDURE — 87449 NOS EACH ORGANISM AG IA: CPT

## 2023-01-01 PROCEDURE — 36556 INSERT NON-TUNNEL CV CATH: CPT

## 2023-01-01 PROCEDURE — 86850 RBC ANTIBODY SCREEN: CPT

## 2023-01-01 PROCEDURE — 2580000003 HC RX 258: Performed by: NURSE PRACTITIONER

## 2023-01-01 PROCEDURE — 83690 ASSAY OF LIPASE: CPT

## 2023-01-01 PROCEDURE — 84157 ASSAY OF PROTEIN OTHER: CPT

## 2023-01-01 PROCEDURE — 2500000003 HC RX 250 WO HCPCS: Performed by: STUDENT IN AN ORGANIZED HEALTH CARE EDUCATION/TRAINING PROGRAM

## 2023-01-01 PROCEDURE — 0202U NFCT DS 22 TRGT SARS-COV-2: CPT

## 2023-01-01 PROCEDURE — 96368 THER/DIAG CONCURRENT INF: CPT

## 2023-01-01 PROCEDURE — 2580000003 HC RX 258: Performed by: SPECIALIST

## 2023-01-01 PROCEDURE — P9045 ALBUMIN (HUMAN), 5%, 250 ML: HCPCS | Performed by: STUDENT IN AN ORGANIZED HEALTH CARE EDUCATION/TRAINING PROGRAM

## 2023-01-01 PROCEDURE — 85652 RBC SED RATE AUTOMATED: CPT

## 2023-01-01 PROCEDURE — 0BH17EZ INSERTION OF ENDOTRACHEAL AIRWAY INTO TRACHEA, VIA NATURAL OR ARTIFICIAL OPENING: ICD-10-PCS | Performed by: SPECIALIST

## 2023-01-01 PROCEDURE — 84295 ASSAY OF SERUM SODIUM: CPT

## 2023-01-01 PROCEDURE — 86901 BLOOD TYPING SEROLOGIC RH(D): CPT

## 2023-01-01 PROCEDURE — 80053 COMPREHEN METABOLIC PANEL: CPT

## 2023-01-01 PROCEDURE — 80048 BASIC METABOLIC PNL TOTAL CA: CPT

## 2023-01-01 PROCEDURE — 99232 SBSQ HOSP IP/OBS MODERATE 35: CPT | Performed by: INTERNAL MEDICINE

## 2023-01-01 PROCEDURE — 99223 1ST HOSP IP/OBS HIGH 75: CPT | Performed by: SURGERY

## 2023-01-01 PROCEDURE — 71045 X-RAY EXAM CHEST 1 VIEW: CPT

## 2023-01-01 PROCEDURE — 2580000003 HC RX 258: Performed by: INTERNAL MEDICINE

## 2023-01-01 PROCEDURE — 2700000000 HC OXYGEN THERAPY PER DAY

## 2023-01-01 PROCEDURE — 81001 URINALYSIS AUTO W/SCOPE: CPT

## 2023-01-01 PROCEDURE — 84145 PROCALCITONIN (PCT): CPT

## 2023-01-01 PROCEDURE — 87040 BLOOD CULTURE FOR BACTERIA: CPT

## 2023-01-01 PROCEDURE — 93970 EXTREMITY STUDY: CPT

## 2023-01-01 PROCEDURE — 6360000002 HC RX W HCPCS: Performed by: INTERNAL MEDICINE

## 2023-01-01 PROCEDURE — 2140000000 HC CCU INTERMEDIATE R&B

## 2023-01-01 PROCEDURE — 94761 N-INVAS EAR/PLS OXIMETRY MLT: CPT

## 2023-01-01 PROCEDURE — 2500000003 HC RX 250 WO HCPCS: Performed by: PHYSICIAN ASSISTANT

## 2023-01-01 PROCEDURE — 86900 BLOOD TYPING SEROLOGIC ABO: CPT

## 2023-01-01 PROCEDURE — 2580000003 HC RX 258: Performed by: STUDENT IN AN ORGANIZED HEALTH CARE EDUCATION/TRAINING PROGRAM

## 2023-01-01 PROCEDURE — 6360000002 HC RX W HCPCS: Performed by: PHYSICIAN ASSISTANT

## 2023-01-01 PROCEDURE — 80202 ASSAY OF VANCOMYCIN: CPT

## 2023-01-01 PROCEDURE — 02HV33Z INSERTION OF INFUSION DEVICE INTO SUPERIOR VENA CAVA, PERCUTANEOUS APPROACH: ICD-10-PCS | Performed by: STUDENT IN AN ORGANIZED HEALTH CARE EDUCATION/TRAINING PROGRAM

## 2023-01-01 PROCEDURE — 83605 ASSAY OF LACTIC ACID: CPT

## 2023-01-01 PROCEDURE — 82805 BLOOD GASES W/O2 SATURATION: CPT

## 2023-01-01 PROCEDURE — 0W9930Z DRAINAGE OF RIGHT PLEURAL CAVITY WITH DRAINAGE DEVICE, PERCUTANEOUS APPROACH: ICD-10-PCS | Performed by: STUDENT IN AN ORGANIZED HEALTH CARE EDUCATION/TRAINING PROGRAM

## 2023-01-01 PROCEDURE — 6360000002 HC RX W HCPCS

## 2023-01-01 PROCEDURE — 82945 GLUCOSE OTHER FLUID: CPT

## 2023-01-01 PROCEDURE — 85730 THROMBOPLASTIN TIME PARTIAL: CPT

## 2023-01-01 PROCEDURE — 6360000002 HC RX W HCPCS: Performed by: STUDENT IN AN ORGANIZED HEALTH CARE EDUCATION/TRAINING PROGRAM

## 2023-01-01 PROCEDURE — C9113 INJ PANTOPRAZOLE SODIUM, VIA: HCPCS | Performed by: STUDENT IN AN ORGANIZED HEALTH CARE EDUCATION/TRAINING PROGRAM

## 2023-01-01 PROCEDURE — 6360000004 HC RX CONTRAST MEDICATION: Performed by: INTERNAL MEDICINE

## 2023-01-01 PROCEDURE — 85025 COMPLETE CBC W/AUTO DIFF WBC: CPT

## 2023-01-01 PROCEDURE — 6360000002 HC RX W HCPCS: Performed by: SPECIALIST

## 2023-01-01 PROCEDURE — 6370000000 HC RX 637 (ALT 250 FOR IP): Performed by: STUDENT IN AN ORGANIZED HEALTH CARE EDUCATION/TRAINING PROGRAM

## 2023-01-01 PROCEDURE — 2580000003 HC RX 258: Performed by: PHYSICIAN ASSISTANT

## 2023-01-01 PROCEDURE — 85610 PROTHROMBIN TIME: CPT

## 2023-01-01 PROCEDURE — 84132 ASSAY OF SERUM POTASSIUM: CPT

## 2023-01-01 PROCEDURE — 82330 ASSAY OF CALCIUM: CPT

## 2023-01-01 PROCEDURE — 85014 HEMATOCRIT: CPT

## 2023-01-01 PROCEDURE — 80051 ELECTROLYTE PANEL: CPT

## 2023-01-01 PROCEDURE — 85007 BL SMEAR W/DIFF WBC COUNT: CPT

## 2023-01-01 PROCEDURE — 83735 ASSAY OF MAGNESIUM: CPT

## 2023-01-01 PROCEDURE — 87205 SMEAR GRAM STAIN: CPT

## 2023-01-01 PROCEDURE — 2500000003 HC RX 250 WO HCPCS

## 2023-01-01 PROCEDURE — 93005 ELECTROCARDIOGRAM TRACING: CPT | Performed by: PHYSICIAN ASSISTANT

## 2023-01-01 PROCEDURE — 85027 COMPLETE CBC AUTOMATED: CPT

## 2023-01-01 PROCEDURE — 2580000003 HC RX 258

## 2023-01-01 PROCEDURE — 93306 TTE W/DOPPLER COMPLETE: CPT

## 2023-01-01 PROCEDURE — C9113 INJ PANTOPRAZOLE SODIUM, VIA: HCPCS | Performed by: PHYSICIAN ASSISTANT

## 2023-01-01 PROCEDURE — 82565 ASSAY OF CREATININE: CPT

## 2023-01-01 PROCEDURE — 6360000002 HC RX W HCPCS: Performed by: NURSE PRACTITIONER

## 2023-01-01 PROCEDURE — 87070 CULTURE OTHR SPECIMN AEROBIC: CPT

## 2023-01-01 PROCEDURE — 82150 ASSAY OF AMYLASE: CPT

## 2023-01-01 PROCEDURE — 2500000003 HC RX 250 WO HCPCS: Performed by: NURSE PRACTITIONER

## 2023-01-01 PROCEDURE — 88108 CYTOPATH CONCENTRATE TECH: CPT | Performed by: PATHOLOGY

## 2023-01-01 PROCEDURE — P9016 RBC LEUKOCYTES REDUCED: HCPCS

## 2023-01-01 PROCEDURE — 36620 INSERTION CATHETER ARTERY: CPT

## 2023-01-01 PROCEDURE — 86922 COMPATIBILITY TEST ANTIGLOB: CPT

## 2023-01-01 PROCEDURE — 81003 URINALYSIS AUTO W/O SCOPE: CPT

## 2023-01-01 PROCEDURE — 94640 AIRWAY INHALATION TREATMENT: CPT

## 2023-01-01 PROCEDURE — 0BJ08ZZ INSPECTION OF TRACHEOBRONCHIAL TREE, VIA NATURAL OR ARTIFICIAL OPENING ENDOSCOPIC: ICD-10-PCS | Performed by: STUDENT IN AN ORGANIZED HEALTH CARE EDUCATION/TRAINING PROGRAM

## 2023-01-01 PROCEDURE — 76937 US GUIDE VASCULAR ACCESS: CPT

## 2023-01-01 PROCEDURE — 99222 1ST HOSP IP/OBS MODERATE 55: CPT | Performed by: INTERNAL MEDICINE

## 2023-01-01 PROCEDURE — 74018 RADEX ABDOMEN 1 VIEW: CPT

## 2023-01-01 PROCEDURE — 99285 EMERGENCY DEPT VISIT HI MDM: CPT

## 2023-01-01 PROCEDURE — 89051 BODY FLUID CELL COUNT: CPT

## 2023-01-01 PROCEDURE — 87081 CULTURE SCREEN ONLY: CPT

## 2023-01-01 PROCEDURE — 88305 TISSUE EXAM BY PATHOLOGIST: CPT | Performed by: PATHOLOGY

## 2023-01-01 PROCEDURE — 96367 TX/PROPH/DG ADDL SEQ IV INF: CPT

## 2023-01-01 PROCEDURE — 83986 ASSAY PH BODY FLUID NOS: CPT

## 2023-01-01 PROCEDURE — 87086 URINE CULTURE/COLONY COUNT: CPT

## 2023-01-01 PROCEDURE — 89220 SPUTUM SPECIMEN COLLECTION: CPT

## 2023-01-01 PROCEDURE — 86738 MYCOPLASMA ANTIBODY: CPT

## 2023-01-01 PROCEDURE — 6370000000 HC RX 637 (ALT 250 FOR IP): Performed by: NURSE PRACTITIONER

## 2023-01-01 PROCEDURE — 94002 VENT MGMT INPAT INIT DAY: CPT

## 2023-01-01 PROCEDURE — 2000000000 HC ICU R&B

## 2023-01-01 PROCEDURE — 2500000003 HC RX 250 WO HCPCS: Performed by: SPECIALIST

## 2023-01-01 PROCEDURE — 74176 CT ABD & PELVIS W/O CONTRAST: CPT

## 2023-01-01 PROCEDURE — 85018 HEMOGLOBIN: CPT

## 2023-01-01 PROCEDURE — 93005 ELECTROCARDIOGRAM TRACING: CPT | Performed by: STUDENT IN AN ORGANIZED HEALTH CARE EDUCATION/TRAINING PROGRAM

## 2023-01-01 PROCEDURE — 74177 CT ABD & PELVIS W/CONTRAST: CPT

## 2023-01-01 PROCEDURE — 94003 VENT MGMT INPAT SUBQ DAY: CPT

## 2023-01-01 PROCEDURE — 84478 ASSAY OF TRIGLYCERIDES: CPT

## 2023-01-01 PROCEDURE — 84443 ASSAY THYROID STIM HORMONE: CPT

## 2023-01-01 PROCEDURE — 87077 CULTURE AEROBIC IDENTIFY: CPT

## 2023-01-01 PROCEDURE — 2500000003 HC RX 250 WO HCPCS: Performed by: INTERNAL MEDICINE

## 2023-01-01 PROCEDURE — 2709999900 HC NON-CHARGEABLE SUPPLY

## 2023-01-01 PROCEDURE — 99232 SBSQ HOSP IP/OBS MODERATE 35: CPT | Performed by: SURGERY

## 2023-01-01 PROCEDURE — 82248 BILIRUBIN DIRECT: CPT

## 2023-01-01 PROCEDURE — 82962 GLUCOSE BLOOD TEST: CPT

## 2023-01-01 PROCEDURE — 83615 LACTATE (LD) (LDH) ENZYME: CPT

## 2023-01-01 PROCEDURE — 83880 ASSAY OF NATRIURETIC PEPTIDE: CPT

## 2023-01-01 PROCEDURE — 96361 HYDRATE IV INFUSION ADD-ON: CPT

## 2023-01-01 PROCEDURE — 6360000002 HC RX W HCPCS: Performed by: EMERGENCY MEDICINE

## 2023-01-01 PROCEDURE — 84100 ASSAY OF PHOSPHORUS: CPT

## 2023-01-01 PROCEDURE — 5A1945Z RESPIRATORY VENTILATION, 24-96 CONSECUTIVE HOURS: ICD-10-PCS | Performed by: SPECIALIST

## 2023-01-01 PROCEDURE — 84484 ASSAY OF TROPONIN QUANT: CPT

## 2023-01-01 PROCEDURE — 93010 ELECTROCARDIOGRAM REPORT: CPT | Performed by: INTERNAL MEDICINE

## 2023-01-01 PROCEDURE — 96366 THER/PROPH/DIAG IV INF ADDON: CPT

## 2023-01-01 PROCEDURE — 86140 C-REACTIVE PROTEIN: CPT

## 2023-01-01 PROCEDURE — 36410 VNPNXR 3YR/> PHY/QHP DX/THER: CPT

## 2023-01-01 PROCEDURE — 96365 THER/PROPH/DIAG IV INF INIT: CPT

## 2023-01-01 PROCEDURE — 96376 TX/PRO/DX INJ SAME DRUG ADON: CPT

## 2023-01-01 PROCEDURE — 87899 AGENT NOS ASSAY W/OPTIC: CPT

## 2023-01-01 PROCEDURE — 71275 CT ANGIOGRAPHY CHEST: CPT

## 2023-01-01 PROCEDURE — 87186 SC STD MICRODIL/AGAR DIL: CPT

## 2023-01-01 PROCEDURE — 96375 TX/PRO/DX INJ NEW DRUG ADDON: CPT

## 2023-01-01 PROCEDURE — 31500 INSERT EMERGENCY AIRWAY: CPT

## 2023-01-01 PROCEDURE — 32551 INSERTION OF CHEST TUBE: CPT

## 2023-01-01 RX ORDER — SODIUM CHLORIDE 9 MG/ML
INJECTION, SOLUTION INTRAVENOUS CONTINUOUS
Status: DISCONTINUED | OUTPATIENT
Start: 2023-01-01 | End: 2023-01-01

## 2023-01-01 RX ORDER — ACETAMINOPHEN 325 MG/1
650 TABLET ORAL EVERY 6 HOURS PRN
Status: DISCONTINUED | OUTPATIENT
Start: 2023-01-01 | End: 2023-01-01

## 2023-01-01 RX ORDER — TAMSULOSIN HYDROCHLORIDE 0.4 MG/1
0.4 CAPSULE ORAL DAILY
COMMUNITY

## 2023-01-01 RX ORDER — CALCIUM CHLORIDE 100 MG/ML
1000 INJECTION INTRAVENOUS; INTRAVENTRICULAR ONCE
Status: COMPLETED | OUTPATIENT
Start: 2023-01-01 | End: 2023-01-01

## 2023-01-01 RX ORDER — MIDAZOLAM HYDROCHLORIDE 1 MG/ML
INJECTION INTRAMUSCULAR; INTRAVENOUS
Status: COMPLETED
Start: 2023-01-01 | End: 2023-01-01

## 2023-01-01 RX ORDER — PANTOPRAZOLE SODIUM 40 MG/1
40 TABLET, DELAYED RELEASE ORAL 2 TIMES DAILY
COMMUNITY

## 2023-01-01 RX ORDER — 0.9 % SODIUM CHLORIDE 0.9 %
1000 INTRAVENOUS SOLUTION INTRAVENOUS ONCE
Status: COMPLETED | OUTPATIENT
Start: 2023-01-01 | End: 2023-01-01

## 2023-01-01 RX ORDER — DILTIAZEM HYDROCHLORIDE 5 MG/ML
10 INJECTION INTRAVENOUS ONCE
Status: COMPLETED | OUTPATIENT
Start: 2023-01-01 | End: 2023-01-01

## 2023-01-01 RX ORDER — SENNA PLUS 8.6 MG/1
1 TABLET ORAL NIGHTLY
COMMUNITY

## 2023-01-01 RX ORDER — SODIUM CHLORIDE 0.9 % (FLUSH) 0.9 %
5-40 SYRINGE (ML) INJECTION PRN
Status: DISCONTINUED | OUTPATIENT
Start: 2023-01-01 | End: 2023-01-01

## 2023-01-01 RX ORDER — BUMETANIDE 0.25 MG/ML
2 INJECTION, SOLUTION INTRAMUSCULAR; INTRAVENOUS ONCE
Status: DISCONTINUED | OUTPATIENT
Start: 2023-01-01 | End: 2023-01-01

## 2023-01-01 RX ORDER — ALBUTEROL SULFATE 90 UG/1
2 AEROSOL, METERED RESPIRATORY (INHALATION) EVERY 6 HOURS PRN
COMMUNITY

## 2023-01-01 RX ORDER — ALBUTEROL SULFATE 90 UG/1
2 AEROSOL, METERED RESPIRATORY (INHALATION) EVERY 6 HOURS PRN
Status: DISCONTINUED | OUTPATIENT
Start: 2023-01-01 | End: 2023-01-01

## 2023-01-01 RX ORDER — SODIUM CHLORIDE 9 MG/ML
INJECTION, SOLUTION INTRAVENOUS PRN
Status: DISCONTINUED | OUTPATIENT
Start: 2023-01-01 | End: 2023-01-01 | Stop reason: SDUPTHER

## 2023-01-01 RX ORDER — FERROUS SULFATE 325(65) MG
325 TABLET ORAL
Status: DISCONTINUED | OUTPATIENT
Start: 2023-01-01 | End: 2023-01-01 | Stop reason: HOSPADM

## 2023-01-01 RX ORDER — ONDANSETRON 2 MG/ML
4 INJECTION INTRAMUSCULAR; INTRAVENOUS EVERY 30 MIN PRN
Status: DISCONTINUED | OUTPATIENT
Start: 2023-01-01 | End: 2023-01-01

## 2023-01-01 RX ORDER — PANTOPRAZOLE SODIUM 40 MG/10ML
40 INJECTION, POWDER, LYOPHILIZED, FOR SOLUTION INTRAVENOUS DAILY
Status: DISCONTINUED | OUTPATIENT
Start: 2023-01-01 | End: 2023-01-01

## 2023-01-01 RX ORDER — CYANOCOBALAMIN 1000 UG/ML
1000 INJECTION, SOLUTION INTRAMUSCULAR; SUBCUTANEOUS
COMMUNITY

## 2023-01-01 RX ORDER — AMIODARONE HYDROCHLORIDE 200 MG/1
400 TABLET ORAL NIGHTLY
COMMUNITY

## 2023-01-01 RX ORDER — PROMETHAZINE HYDROCHLORIDE 25 MG/ML
6.25 INJECTION, SOLUTION INTRAMUSCULAR; INTRAVENOUS EVERY 6 HOURS PRN
COMMUNITY

## 2023-01-01 RX ORDER — GABAPENTIN 100 MG/1
200 CAPSULE ORAL NIGHTLY
Status: DISCONTINUED | OUTPATIENT
Start: 2023-01-01 | End: 2023-01-01

## 2023-01-01 RX ORDER — ALBUMIN, HUMAN INJ 5% 5 %
25 SOLUTION INTRAVENOUS ONCE
Status: COMPLETED | OUTPATIENT
Start: 2023-01-01 | End: 2023-01-01

## 2023-01-01 RX ORDER — MIDAZOLAM HYDROCHLORIDE 2 MG/2ML
1 INJECTION, SOLUTION INTRAMUSCULAR; INTRAVENOUS ONCE
Status: COMPLETED | OUTPATIENT
Start: 2023-01-01 | End: 2023-01-01

## 2023-01-01 RX ORDER — ASPIRIN 81 MG/1
81 TABLET ORAL DAILY
COMMUNITY

## 2023-01-01 RX ORDER — KETOROLAC TROMETHAMINE 30 MG/ML
15 INJECTION, SOLUTION INTRAMUSCULAR; INTRAVENOUS EVERY 6 HOURS PRN
Status: DISCONTINUED | OUTPATIENT
Start: 2023-01-01 | End: 2023-01-01

## 2023-01-01 RX ORDER — POTASSIUM CHLORIDE 750 MG/1
10 TABLET, EXTENDED RELEASE ORAL 2 TIMES DAILY
COMMUNITY

## 2023-01-01 RX ORDER — CASTOR OIL AND BALSAM, PERU 788; 87 MG/G; MG/G
OINTMENT TOPICAL 2 TIMES DAILY
COMMUNITY

## 2023-01-01 RX ORDER — FUROSEMIDE 10 MG/ML
40 INJECTION INTRAMUSCULAR; INTRAVENOUS ONCE
Status: COMPLETED | OUTPATIENT
Start: 2023-01-01 | End: 2023-01-01

## 2023-01-01 RX ORDER — ONDANSETRON 2 MG/ML
4 INJECTION INTRAMUSCULAR; INTRAVENOUS EVERY 6 HOURS PRN
Status: DISCONTINUED | OUTPATIENT
Start: 2023-01-01 | End: 2023-01-01

## 2023-01-01 RX ORDER — NOREPINEPHRINE BIT/0.9 % NACL 16MG/250ML
1-40 INFUSION BOTTLE (ML) INTRAVENOUS CONTINUOUS
Status: DISCONTINUED | OUTPATIENT
Start: 2023-01-01 | End: 2023-01-01

## 2023-01-01 RX ORDER — POTASSIUM CHLORIDE 29.8 MG/ML
20 INJECTION INTRAVENOUS PRN
Status: DISCONTINUED | OUTPATIENT
Start: 2023-01-01 | End: 2023-01-01

## 2023-01-01 RX ORDER — CALCIUM POLYCARBOPHIL 625 MG 625 MG/1
625 TABLET ORAL 2 TIMES DAILY
COMMUNITY

## 2023-01-01 RX ORDER — METOPROLOL TARTRATE 5 MG/5ML
5 INJECTION INTRAVENOUS EVERY 6 HOURS
Status: DISCONTINUED | OUTPATIENT
Start: 2023-01-01 | End: 2023-01-01 | Stop reason: HOSPADM

## 2023-01-01 RX ORDER — ATORVASTATIN CALCIUM 10 MG/1
20 TABLET, FILM COATED ORAL NIGHTLY
Status: DISCONTINUED | OUTPATIENT
Start: 2023-01-01 | End: 2023-01-01 | Stop reason: HOSPADM

## 2023-01-01 RX ORDER — LORAZEPAM 2 MG/ML
1 INJECTION INTRAMUSCULAR EVERY 6 HOURS PRN
Status: DISCONTINUED | OUTPATIENT
Start: 2023-01-01 | End: 2023-01-01 | Stop reason: HOSPADM

## 2023-01-01 RX ORDER — CHLORHEXIDINE GLUCONATE 0.12 MG/ML
15 RINSE ORAL 2 TIMES DAILY
Status: DISCONTINUED | OUTPATIENT
Start: 2023-01-01 | End: 2023-01-01

## 2023-01-01 RX ORDER — MAGNESIUM SULFATE IN WATER 40 MG/ML
2000 INJECTION, SOLUTION INTRAVENOUS ONCE
Status: COMPLETED | OUTPATIENT
Start: 2023-01-01 | End: 2023-01-01

## 2023-01-01 RX ORDER — ATORVASTATIN CALCIUM 20 MG/1
20 TABLET, FILM COATED ORAL NIGHTLY
COMMUNITY

## 2023-01-01 RX ORDER — FENTANYL CITRATE 50 UG/ML
INJECTION, SOLUTION INTRAMUSCULAR; INTRAVENOUS
Status: COMPLETED
Start: 2023-01-01 | End: 2023-01-01

## 2023-01-01 RX ORDER — LEVOTHYROXINE SODIUM 0.07 MG/1
75 TABLET ORAL DAILY
COMMUNITY

## 2023-01-01 RX ORDER — FAMOTIDINE 20 MG/1
20 TABLET, FILM COATED ORAL 2 TIMES DAILY
COMMUNITY

## 2023-01-01 RX ORDER — DEXAMETHASONE SODIUM PHOSPHATE 4 MG/ML
6 INJECTION, SOLUTION INTRA-ARTICULAR; INTRALESIONAL; INTRAMUSCULAR; INTRAVENOUS; SOFT TISSUE EVERY 24 HOURS
Status: DISCONTINUED | OUTPATIENT
Start: 2023-01-01 | End: 2023-01-01

## 2023-01-01 RX ORDER — MORPHINE SULFATE 4 MG/ML
4 INJECTION, SOLUTION INTRAMUSCULAR; INTRAVENOUS ONCE
Status: COMPLETED | OUTPATIENT
Start: 2023-01-01 | End: 2023-01-01

## 2023-01-01 RX ORDER — PROCHLORPERAZINE EDISYLATE 5 MG/ML
10 INJECTION INTRAMUSCULAR; INTRAVENOUS EVERY 6 HOURS PRN
Status: DISCONTINUED | OUTPATIENT
Start: 2023-01-01 | End: 2023-01-01 | Stop reason: HOSPADM

## 2023-01-01 RX ORDER — MORPHINE SULFATE 2 MG/ML
2 INJECTION, SOLUTION INTRAMUSCULAR; INTRAVENOUS EVERY 4 HOURS PRN
Status: DISCONTINUED | OUTPATIENT
Start: 2023-01-01 | End: 2023-01-01

## 2023-01-01 RX ORDER — MAGNESIUM HYDROXIDE/ALUMINUM HYDROXICE/SIMETHICONE 120; 1200; 1200 MG/30ML; MG/30ML; MG/30ML
30 SUSPENSION ORAL DAILY PRN
COMMUNITY

## 2023-01-01 RX ORDER — FENTANYL CITRATE 50 UG/ML
100 INJECTION, SOLUTION INTRAMUSCULAR; INTRAVENOUS EVERY 30 MIN PRN
Status: DISCONTINUED | OUTPATIENT
Start: 2023-01-01 | End: 2023-01-01 | Stop reason: HOSPADM

## 2023-01-01 RX ORDER — GABAPENTIN 100 MG/1
200 CAPSULE ORAL NIGHTLY
COMMUNITY

## 2023-01-01 RX ORDER — NOREPINEPHRINE BIT/0.9 % NACL 16MG/250ML
1-100 INFUSION BOTTLE (ML) INTRAVENOUS CONTINUOUS
Status: DISCONTINUED | OUTPATIENT
Start: 2023-01-01 | End: 2023-01-01

## 2023-01-01 RX ORDER — SODIUM CHLORIDE 0.9 % (FLUSH) 0.9 %
5-40 SYRINGE (ML) INJECTION EVERY 12 HOURS SCHEDULED
Status: DISCONTINUED | OUTPATIENT
Start: 2023-01-01 | End: 2023-01-01

## 2023-01-01 RX ORDER — LEVOTHYROXINE SODIUM 0.07 MG/1
75 TABLET ORAL DAILY
Status: DISCONTINUED | OUTPATIENT
Start: 2023-01-01 | End: 2023-01-01

## 2023-01-01 RX ORDER — PANTOPRAZOLE SODIUM 40 MG/10ML
80 INJECTION, POWDER, LYOPHILIZED, FOR SOLUTION INTRAVENOUS ONCE
Status: COMPLETED | OUTPATIENT
Start: 2023-01-01 | End: 2023-01-01

## 2023-01-01 RX ORDER — POTASSIUM CHLORIDE 7.45 MG/ML
10 INJECTION INTRAVENOUS PRN
Status: DISCONTINUED | OUTPATIENT
Start: 2023-01-01 | End: 2023-01-01

## 2023-01-01 RX ORDER — BUMETANIDE 0.25 MG/ML
2 INJECTION, SOLUTION INTRAMUSCULAR; INTRAVENOUS ONCE
Status: COMPLETED | OUTPATIENT
Start: 2023-01-01 | End: 2023-01-01

## 2023-01-01 RX ORDER — SODIUM CHLORIDE 9 MG/ML
INJECTION, SOLUTION INTRAVENOUS PRN
Status: DISCONTINUED | OUTPATIENT
Start: 2023-01-01 | End: 2023-01-01

## 2023-01-01 RX ORDER — ALBUMIN, HUMAN INJ 5% 5 %
12.5 SOLUTION INTRAVENOUS ONCE
Status: COMPLETED | OUTPATIENT
Start: 2023-01-01 | End: 2023-01-01

## 2023-01-01 RX ORDER — AMIODARONE HYDROCHLORIDE 200 MG/1
400 TABLET ORAL NIGHTLY
Status: DISCONTINUED | OUTPATIENT
Start: 2023-01-01 | End: 2023-01-01 | Stop reason: HOSPADM

## 2023-01-01 RX ORDER — FENTANYL CITRATE 50 UG/ML
50 INJECTION, SOLUTION INTRAMUSCULAR; INTRAVENOUS
Status: DISCONTINUED | OUTPATIENT
Start: 2023-01-01 | End: 2023-01-01

## 2023-01-01 RX ORDER — DOCUSATE SODIUM 100 MG/1
100 CAPSULE, LIQUID FILLED ORAL 2 TIMES DAILY
COMMUNITY

## 2023-01-01 RX ORDER — KETAMINE HYDROCHLORIDE 10 MG/ML
100 INJECTION INTRAMUSCULAR; INTRAVENOUS ONCE
Status: COMPLETED | OUTPATIENT
Start: 2023-01-01 | End: 2023-01-01

## 2023-01-01 RX ORDER — GLYCOPYRROLATE 0.2 MG/ML
0.2 INJECTION INTRAMUSCULAR; INTRAVENOUS EVERY 4 HOURS PRN
Status: DISCONTINUED | OUTPATIENT
Start: 2023-01-01 | End: 2023-01-01 | Stop reason: HOSPADM

## 2023-01-01 RX ORDER — FENTANYL CITRATE 50 UG/ML
50 INJECTION, SOLUTION INTRAMUSCULAR; INTRAVENOUS ONCE
Status: COMPLETED | OUTPATIENT
Start: 2023-01-01 | End: 2023-01-01

## 2023-01-01 RX ORDER — PROPOFOL 10 MG/ML
5-50 INJECTION, EMULSION INTRAVENOUS CONTINUOUS
Status: DISCONTINUED | OUTPATIENT
Start: 2023-01-01 | End: 2023-01-01

## 2023-01-01 RX ORDER — PROPOFOL 10 MG/ML
INJECTION, EMULSION INTRAVENOUS
Status: DISPENSED
Start: 2023-01-01 | End: 2023-01-01

## 2023-01-01 RX ORDER — HYDROCODONE BITARTRATE AND ACETAMINOPHEN 5; 325 MG/1; MG/1
1 TABLET ORAL EVERY 6 HOURS PRN
COMMUNITY

## 2023-01-01 RX ORDER — NALOXONE HYDROCHLORIDE 0.4 MG/ML
INJECTION, SOLUTION INTRAMUSCULAR; INTRAVENOUS; SUBCUTANEOUS
Status: DISPENSED
Start: 2023-01-01 | End: 2023-01-01

## 2023-01-01 RX ORDER — NOREPINEPHRINE BIT/0.9 % NACL 16MG/250ML
INFUSION BOTTLE (ML) INTRAVENOUS
Status: COMPLETED
Start: 2023-01-01 | End: 2023-01-01

## 2023-01-01 RX ORDER — POLYETHYLENE GLYCOL 3350 17 G/17G
34 POWDER, FOR SOLUTION ORAL DAILY
COMMUNITY

## 2023-01-01 RX ORDER — NOREPINEPHRINE BIT/0.9 % NACL 16MG/250ML
INFUSION BOTTLE (ML) INTRAVENOUS
Status: DISPENSED
Start: 2023-01-01 | End: 2023-01-01

## 2023-01-01 RX ORDER — TRISODIUM CITRATE DIHYDRATE AND CITRIC ACID MONOHYDRATE 500; 334 MG/5ML; MG/5ML
30 SOLUTION ORAL 2 TIMES DAILY
Status: DISCONTINUED | OUTPATIENT
Start: 2023-01-01 | End: 2023-01-01

## 2023-01-01 RX ORDER — FERROUS SULFATE 325(65) MG
325 TABLET ORAL
COMMUNITY

## 2023-01-01 RX ORDER — PANTOPRAZOLE SODIUM 40 MG/10ML
40 INJECTION, POWDER, LYOPHILIZED, FOR SOLUTION INTRAVENOUS 2 TIMES DAILY
Status: DISCONTINUED | OUTPATIENT
Start: 2023-01-01 | End: 2023-01-01

## 2023-01-01 RX ADMIN — MILRINONE LACTATE: 1 INJECTION, SOLUTION INTRAVENOUS at 20:06

## 2023-01-01 RX ADMIN — SODIUM BICARBONATE: 84 INJECTION, SOLUTION INTRAVENOUS at 16:43

## 2023-01-01 RX ADMIN — DEXAMETHASONE SODIUM PHOSPHATE 20 MG: 4 INJECTION INTRA-ARTICULAR; INTRALESIONAL; INTRAMUSCULAR; INTRAVENOUS; SOFT TISSUE at 05:09

## 2023-01-01 RX ADMIN — ONDANSETRON 4 MG: 2 INJECTION INTRAMUSCULAR; INTRAVENOUS at 11:53

## 2023-01-01 RX ADMIN — MIDAZOLAM 1 MG: 1 INJECTION INTRAMUSCULAR; INTRAVENOUS at 01:28

## 2023-01-01 RX ADMIN — SODIUM CHLORIDE: 9 INJECTION, SOLUTION INTRAVENOUS at 13:28

## 2023-01-01 RX ADMIN — VANCOMYCIN HYDROCHLORIDE 750 MG: 750 INJECTION, POWDER, LYOPHILIZED, FOR SOLUTION INTRAVENOUS at 05:05

## 2023-01-01 RX ADMIN — POTASSIUM CHLORIDE 10 MEQ: 7.46 INJECTION, SOLUTION INTRAVENOUS at 11:20

## 2023-01-01 RX ADMIN — MILRINONE LACTATE: 1 INJECTION, SOLUTION INTRAVENOUS at 00:00

## 2023-01-01 RX ADMIN — MORPHINE SULFATE 2 MG: 2 INJECTION, SOLUTION INTRAMUSCULAR; INTRAVENOUS at 21:41

## 2023-01-01 RX ADMIN — MILRINONE LACTATE: 1 INJECTION, SOLUTION INTRAVENOUS at 08:21

## 2023-01-01 RX ADMIN — EPINEPHRINE 1 MCG/MIN: 1 INJECTION, SOLUTION, CONCENTRATE INTRAVENOUS at 20:49

## 2023-01-01 RX ADMIN — PANTOPRAZOLE SODIUM 40 MG: 40 INJECTION, POWDER, FOR SOLUTION INTRAVENOUS at 09:03

## 2023-01-01 RX ADMIN — PIPERACILLIN AND TAZOBACTAM 3375 MG: 3; .375 INJECTION, POWDER, LYOPHILIZED, FOR SOLUTION INTRAVENOUS at 09:10

## 2023-01-01 RX ADMIN — CEFEPIME HYDROCHLORIDE 2000 MG: 2 INJECTION, POWDER, FOR SOLUTION INTRAVENOUS at 13:26

## 2023-01-01 RX ADMIN — PROCHLORPERAZINE EDISYLATE 10 MG: 5 INJECTION INTRAMUSCULAR; INTRAVENOUS at 13:09

## 2023-01-01 RX ADMIN — EPINEPHRINE 40 MCG/MIN: 1 INJECTION, SOLUTION, CONCENTRATE INTRAVENOUS at 08:02

## 2023-01-01 RX ADMIN — LEVOTHYROXINE SODIUM 75 MCG: 0.07 TABLET ORAL at 10:15

## 2023-01-01 RX ADMIN — MORPHINE SULFATE 2 MG: 2 INJECTION, SOLUTION INTRAMUSCULAR; INTRAVENOUS at 10:06

## 2023-01-01 RX ADMIN — ATORVASTATIN CALCIUM 20 MG: 10 TABLET, FILM COATED ORAL at 21:34

## 2023-01-01 RX ADMIN — Medication 40 MCG/MIN: at 23:30

## 2023-01-01 RX ADMIN — METOPROLOL TARTRATE 5 MG: 5 INJECTION INTRAVENOUS at 18:09

## 2023-01-01 RX ADMIN — VASOPRESSIN 0.04 UNITS/MIN: 0.2 INJECTION INTRAVENOUS at 14:53

## 2023-01-01 RX ADMIN — CHLORHEXIDINE GLUCONATE 0.12% ORAL RINSE 15 ML: 1.2 LIQUID ORAL at 09:03

## 2023-01-01 RX ADMIN — SODIUM CHLORIDE, PRESERVATIVE FREE 10 ML: 5 INJECTION INTRAVENOUS at 21:06

## 2023-01-01 RX ADMIN — PANTOPRAZOLE SODIUM 40 MG: 40 INJECTION, POWDER, FOR SOLUTION INTRAVENOUS at 08:36

## 2023-01-01 RX ADMIN — MIDAZOLAM HYDROCHLORIDE 1 MG: 2 INJECTION, SOLUTION INTRAMUSCULAR; INTRAVENOUS at 01:28

## 2023-01-01 RX ADMIN — MILRINONE LACTATE: 1 INJECTION, SOLUTION INTRAVENOUS at 16:24

## 2023-01-01 RX ADMIN — METOPROLOL TARTRATE 5 MG: 5 INJECTION INTRAVENOUS at 06:20

## 2023-01-01 RX ADMIN — MAGNESIUM SULFATE HEPTAHYDRATE 2000 MG: 2 INJECTION, SOLUTION INTRAVENOUS at 07:03

## 2023-01-01 RX ADMIN — Medication 40 MCG/MIN: at 05:24

## 2023-01-01 RX ADMIN — EPINEPHRINE 5 MCG/MIN: 1 INJECTION, SOLUTION, CONCENTRATE INTRAVENOUS at 00:40

## 2023-01-01 RX ADMIN — PANTOPRAZOLE SODIUM 40 MG: 40 INJECTION, POWDER, FOR SOLUTION INTRAVENOUS at 21:04

## 2023-01-01 RX ADMIN — ALBUMIN (HUMAN) 12.5 G: 12.5 INJECTION, SOLUTION INTRAVENOUS at 01:58

## 2023-01-01 RX ADMIN — POTASSIUM CHLORIDE 10 MEQ: 7.46 INJECTION, SOLUTION INTRAVENOUS at 07:04

## 2023-01-01 RX ADMIN — MORPHINE SULFATE 2 MG: 2 INJECTION, SOLUTION INTRAMUSCULAR; INTRAVENOUS at 00:40

## 2023-01-01 RX ADMIN — SODIUM BICARBONATE: 84 INJECTION, SOLUTION INTRAVENOUS at 09:43

## 2023-01-01 RX ADMIN — PROPOFOL 147.23 MCG/KG/MIN: 10 INJECTION, EMULSION INTRAVENOUS at 01:25

## 2023-01-01 RX ADMIN — METOPROLOL TARTRATE 5 MG: 5 INJECTION INTRAVENOUS at 12:21

## 2023-01-01 RX ADMIN — MILRINONE LACTATE: 1 INJECTION, SOLUTION INTRAVENOUS at 06:04

## 2023-01-01 RX ADMIN — POTASSIUM CHLORIDE 10 MEQ: 7.46 INJECTION, SOLUTION INTRAVENOUS at 11:45

## 2023-01-01 RX ADMIN — MORPHINE SULFATE 4 MG: 4 INJECTION, SOLUTION INTRAMUSCULAR; INTRAVENOUS at 12:17

## 2023-01-01 RX ADMIN — POTASSIUM CHLORIDE 10 MEQ: 7.46 INJECTION, SOLUTION INTRAVENOUS at 06:21

## 2023-01-01 RX ADMIN — PIPERACILLIN AND TAZOBACTAM 3375 MG: 3; .375 INJECTION, POWDER, LYOPHILIZED, FOR SOLUTION INTRAVENOUS at 17:34

## 2023-01-01 RX ADMIN — GLYCOPYRROLATE 0.2 MG: 1 INJECTION INTRAMUSCULAR; INTRAVENOUS at 15:24

## 2023-01-01 RX ADMIN — PANTOPRAZOLE SODIUM 40 MG: 40 INJECTION, POWDER, FOR SOLUTION INTRAVENOUS at 08:31

## 2023-01-01 RX ADMIN — POTASSIUM CHLORIDE 10 MEQ: 7.46 INJECTION, SOLUTION INTRAVENOUS at 12:20

## 2023-01-01 RX ADMIN — PANTOPRAZOLE SODIUM 40 MG: 40 INJECTION, POWDER, FOR SOLUTION INTRAVENOUS at 08:49

## 2023-01-01 RX ADMIN — SODIUM CITRATE AND CITRIC ACID MONOHYDRATE 30 ML: 500; 334 SOLUTION ORAL at 10:15

## 2023-01-01 RX ADMIN — VASOPRESSIN 0.03 UNITS/MIN: 0.2 INJECTION INTRAVENOUS at 00:40

## 2023-01-01 RX ADMIN — METOPROLOL TARTRATE 5 MG: 5 INJECTION INTRAVENOUS at 00:43

## 2023-01-01 RX ADMIN — KETAMINE HYDROCHLORIDE 100 MG: 10 INJECTION INTRAMUSCULAR; INTRAVENOUS at 00:15

## 2023-01-01 RX ADMIN — SODIUM CHLORIDE: 9 INJECTION, SOLUTION INTRAVENOUS at 18:42

## 2023-01-01 RX ADMIN — FENTANYL CITRATE 100 MCG: 0.05 INJECTION, SOLUTION INTRAMUSCULAR; INTRAVENOUS at 15:25

## 2023-01-01 RX ADMIN — MORPHINE SULFATE 2 MG: 2 INJECTION, SOLUTION INTRAMUSCULAR; INTRAVENOUS at 22:08

## 2023-01-01 RX ADMIN — DILTIAZEM HYDROCHLORIDE 10 MG: 5 INJECTION INTRAVENOUS at 13:21

## 2023-01-01 RX ADMIN — VASOPRESSIN 0.04 UNITS/MIN: 0.2 INJECTION INTRAVENOUS at 07:25

## 2023-01-01 RX ADMIN — DEXTROSE MONOHYDRATE 5 MG/HR: 50 INJECTION, SOLUTION INTRAVENOUS at 14:22

## 2023-01-01 RX ADMIN — SODIUM BICARBONATE 50 MEQ: 84 INJECTION, SOLUTION INTRAVENOUS at 09:03

## 2023-01-01 RX ADMIN — PROCHLORPERAZINE EDISYLATE 10 MG: 5 INJECTION INTRAMUSCULAR; INTRAVENOUS at 15:26

## 2023-01-01 RX ADMIN — LEVOTHYROXINE SODIUM 75 MCG: 0.07 TABLET ORAL at 06:38

## 2023-01-01 RX ADMIN — PANTOPRAZOLE SODIUM 40 MG: 40 INJECTION, POWDER, FOR SOLUTION INTRAVENOUS at 21:42

## 2023-01-01 RX ADMIN — MEROPENEM 1000 MG: 1 INJECTION, POWDER, FOR SOLUTION INTRAVENOUS at 22:08

## 2023-01-01 RX ADMIN — MEROPENEM 1000 MG: 1 INJECTION, POWDER, FOR SOLUTION INTRAVENOUS at 05:30

## 2023-01-01 RX ADMIN — DEXAMETHASONE SODIUM PHOSPHATE 6 MG: 4 INJECTION, SOLUTION INTRAMUSCULAR; INTRAVENOUS at 11:42

## 2023-01-01 RX ADMIN — GABAPENTIN 200 MG: 100 CAPSULE ORAL at 21:42

## 2023-01-01 RX ADMIN — CEFEPIME HYDROCHLORIDE 2000 MG: 2 INJECTION, POWDER, FOR SOLUTION INTRAVENOUS at 08:35

## 2023-01-01 RX ADMIN — PANTOPRAZOLE SODIUM 40 MG: 40 INJECTION, POWDER, FOR SOLUTION INTRAVENOUS at 20:30

## 2023-01-01 RX ADMIN — SODIUM BICARBONATE: 84 INJECTION, SOLUTION INTRAVENOUS at 11:23

## 2023-01-01 RX ADMIN — ONDANSETRON 4 MG: 2 INJECTION INTRAMUSCULAR; INTRAVENOUS at 19:01

## 2023-01-01 RX ADMIN — CHLORHEXIDINE GLUCONATE 0.12% ORAL RINSE 15 ML: 1.2 LIQUID ORAL at 08:36

## 2023-01-01 RX ADMIN — MEROPENEM 1000 MG: 1 INJECTION, POWDER, FOR SOLUTION INTRAVENOUS at 06:41

## 2023-01-01 RX ADMIN — POTASSIUM CHLORIDE 10 MEQ: 7.46 INJECTION, SOLUTION INTRAVENOUS at 08:30

## 2023-01-01 RX ADMIN — GABAPENTIN 200 MG: 100 CAPSULE ORAL at 21:34

## 2023-01-01 RX ADMIN — KETOROLAC TROMETHAMINE 15 MG: 30 INJECTION, SOLUTION INTRAMUSCULAR; INTRAVENOUS at 20:30

## 2023-01-01 RX ADMIN — SODIUM BICARBONATE: 84 INJECTION, SOLUTION INTRAVENOUS at 11:34

## 2023-01-01 RX ADMIN — MIDAZOLAM 2 MG: 1 INJECTION INTRAMUSCULAR; INTRAVENOUS at 00:16

## 2023-01-01 RX ADMIN — PANTOPRAZOLE SODIUM 40 MG: 40 INJECTION, POWDER, FOR SOLUTION INTRAVENOUS at 06:01

## 2023-01-01 RX ADMIN — CALCIUM CHLORIDE INJECTION 1000 MG: 100 INJECTION, SOLUTION INTRAVENOUS at 02:00

## 2023-01-01 RX ADMIN — ALBUMIN (HUMAN) 25 G: 12.5 INJECTION, SOLUTION INTRAVENOUS at 21:23

## 2023-01-01 RX ADMIN — SODIUM CHLORIDE 1000 ML: 9 INJECTION, SOLUTION INTRAVENOUS at 11:55

## 2023-01-01 RX ADMIN — KETOROLAC TROMETHAMINE 15 MG: 30 INJECTION, SOLUTION INTRAMUSCULAR; INTRAVENOUS at 13:09

## 2023-01-01 RX ADMIN — SODIUM BICARBONATE: 84 INJECTION, SOLUTION INTRAVENOUS at 22:25

## 2023-01-01 RX ADMIN — SODIUM CHLORIDE: 9 INJECTION, SOLUTION INTRAVENOUS at 21:49

## 2023-01-01 RX ADMIN — DEXAMETHASONE SODIUM PHOSPHATE 20 MG: 4 INJECTION INTRA-ARTICULAR; INTRALESIONAL; INTRAMUSCULAR; INTRAVENOUS; SOFT TISSUE at 08:49

## 2023-01-01 RX ADMIN — MILRINONE LACTATE: 1 INJECTION, SOLUTION INTRAVENOUS at 11:47

## 2023-01-01 RX ADMIN — SODIUM CHLORIDE: 9 INJECTION, SOLUTION INTRAVENOUS at 23:06

## 2023-01-01 RX ADMIN — Medication 5 MCG/MIN: at 02:00

## 2023-01-01 RX ADMIN — VANCOMYCIN HYDROCHLORIDE 1250 MG: 1.25 INJECTION, POWDER, LYOPHILIZED, FOR SOLUTION INTRAVENOUS at 09:18

## 2023-01-01 RX ADMIN — MEROPENEM 1000 MG: 1 INJECTION, POWDER, FOR SOLUTION INTRAVENOUS at 17:47

## 2023-01-01 RX ADMIN — FENTANYL CITRATE 50 MCG: 0.05 INJECTION, SOLUTION INTRAMUSCULAR; INTRAVENOUS at 02:54

## 2023-01-01 RX ADMIN — Medication 25 MCG/MIN: at 14:56

## 2023-01-01 RX ADMIN — SODIUM CITRATE AND CITRIC ACID MONOHYDRATE 30 ML: 500; 334 SOLUTION ORAL at 21:34

## 2023-01-01 RX ADMIN — SODIUM CHLORIDE, PRESERVATIVE FREE 10 ML: 5 INJECTION INTRAVENOUS at 21:07

## 2023-01-01 RX ADMIN — MILRINONE LACTATE: 1 INJECTION, SOLUTION INTRAVENOUS at 11:48

## 2023-01-01 RX ADMIN — IOPAMIDOL 75 ML: 755 INJECTION, SOLUTION INTRAVENOUS at 22:17

## 2023-01-01 RX ADMIN — PROCHLORPERAZINE EDISYLATE 10 MG: 5 INJECTION INTRAMUSCULAR; INTRAVENOUS at 04:46

## 2023-01-01 RX ADMIN — FUROSEMIDE 40 MG: 10 INJECTION, SOLUTION INTRAMUSCULAR; INTRAVENOUS at 22:59

## 2023-01-01 RX ADMIN — CHLORHEXIDINE GLUCONATE 0.12% ORAL RINSE 15 ML: 1.2 LIQUID ORAL at 21:42

## 2023-01-01 RX ADMIN — CEFEPIME HYDROCHLORIDE 2000 MG: 2 INJECTION, POWDER, FOR SOLUTION INTRAVENOUS at 01:03

## 2023-01-01 RX ADMIN — FENTANYL CITRATE 50 MCG: 50 INJECTION, SOLUTION INTRAMUSCULAR; INTRAVENOUS at 02:54

## 2023-01-01 RX ADMIN — Medication 28 MCG/MIN: at 11:25

## 2023-01-01 RX ADMIN — MILRINONE LACTATE: 1 INJECTION, SOLUTION INTRAVENOUS at 03:39

## 2023-01-01 RX ADMIN — LORAZEPAM 1 MG: 2 INJECTION INTRAMUSCULAR; INTRAVENOUS at 15:25

## 2023-01-01 RX ADMIN — VASOPRESSIN 0.04 UNITS/MIN: 0.2 INJECTION INTRAVENOUS at 05:25

## 2023-01-01 RX ADMIN — VANCOMYCIN HYDROCHLORIDE 1250 MG: 1.25 INJECTION, POWDER, LYOPHILIZED, FOR SOLUTION INTRAVENOUS at 14:27

## 2023-01-01 RX ADMIN — POTASSIUM CHLORIDE 10 MEQ: 7.46 INJECTION, SOLUTION INTRAVENOUS at 13:34

## 2023-01-01 RX ADMIN — VANCOMYCIN HYDROCHLORIDE 750 MG: 750 INJECTION, POWDER, LYOPHILIZED, FOR SOLUTION INTRAVENOUS at 03:00

## 2023-01-01 RX ADMIN — VASOPRESSIN 0.04 UNITS/MIN: 0.2 INJECTION INTRAVENOUS at 23:10

## 2023-01-01 RX ADMIN — DEXTROSE MONOHYDRATE 15 MG/HR: 50 INJECTION, SOLUTION INTRAVENOUS at 20:41

## 2023-01-01 RX ADMIN — BUMETANIDE 2 MG: 0.25 INJECTION, SOLUTION INTRAMUSCULAR; INTRAVENOUS at 17:46

## 2023-01-01 RX ADMIN — MORPHINE SULFATE 2 MG: 2 INJECTION, SOLUTION INTRAMUSCULAR; INTRAVENOUS at 17:49

## 2023-01-01 RX ADMIN — PANTOPRAZOLE SODIUM 80 MG: 40 INJECTION, POWDER, FOR SOLUTION INTRAVENOUS at 11:54

## 2023-01-01 RX ADMIN — POTASSIUM CHLORIDE 10 MEQ: 7.46 INJECTION, SOLUTION INTRAVENOUS at 10:10

## 2023-01-01 RX ADMIN — MORPHINE SULFATE 2 MG: 2 INJECTION, SOLUTION INTRAMUSCULAR; INTRAVENOUS at 06:01

## 2023-01-01 RX ADMIN — MORPHINE SULFATE 2 MG: 2 INJECTION, SOLUTION INTRAMUSCULAR; INTRAVENOUS at 18:09

## 2023-01-01 ASSESSMENT — PAIN DESCRIPTION - DESCRIPTORS
DESCRIPTORS: ACHING
DESCRIPTORS: ACHING
DESCRIPTORS: PRESSURE;SHARP;STABBING
DESCRIPTORS: ACHING
DESCRIPTORS: STABBING;PRESSURE
DESCRIPTORS: PRESSURE;STABBING
DESCRIPTORS: ACHING
DESCRIPTORS: ACHING
DESCRIPTORS: PRESSURE;STABBING
DESCRIPTORS: ACHING;SHARP;STABBING
DESCRIPTORS: ACHING
DESCRIPTORS: ACHING;SHARP
DESCRIPTORS: ACHING
DESCRIPTORS: ACHING;SHARP;STABBING
DESCRIPTORS: PRESSURE;STABBING

## 2023-01-01 ASSESSMENT — PAIN DESCRIPTION - LOCATION
LOCATION: ABDOMEN
LOCATION: CHEST
LOCATION: ABDOMEN
LOCATION: BACK
LOCATION: ABDOMEN;BACK
LOCATION: ABDOMEN
LOCATION: CHEST
LOCATION: LEG
LOCATION: CHEST;RIB CAGE
LOCATION: ABDOMEN;BACK;GENERALIZED
LOCATION: CHEST
LOCATION: BACK
LOCATION: CHEST;RIB CAGE
LOCATION: LEG
LOCATION: CHEST

## 2023-01-01 ASSESSMENT — PAIN SCALES - GENERAL
PAINLEVEL_OUTOF10: 6
PAINLEVEL_OUTOF10: 8
PAINLEVEL_OUTOF10: 10
PAINLEVEL_OUTOF10: 8
PAINLEVEL_OUTOF10: 0
PAINLEVEL_OUTOF10: 9
PAINLEVEL_OUTOF10: 6
PAINLEVEL_OUTOF10: 0
PAINLEVEL_OUTOF10: 4
PAINLEVEL_OUTOF10: 8
PAINLEVEL_OUTOF10: 4
PAINLEVEL_OUTOF10: 6
PAINLEVEL_OUTOF10: 10
PAINLEVEL_OUTOF10: 0
PAINLEVEL_OUTOF10: 0
PAINLEVEL_OUTOF10: 3
PAINLEVEL_OUTOF10: 2
PAINLEVEL_OUTOF10: 10
PAINLEVEL_OUTOF10: 9
PAINLEVEL_OUTOF10: 0
PAINLEVEL_OUTOF10: 9
PAINLEVEL_OUTOF10: 2
PAINLEVEL_OUTOF10: 7
PAINLEVEL_OUTOF10: 5

## 2023-01-01 ASSESSMENT — PAIN DESCRIPTION - ORIENTATION
ORIENTATION: RIGHT
ORIENTATION: RIGHT
ORIENTATION: MID
ORIENTATION: MID
ORIENTATION: RIGHT
ORIENTATION: RIGHT
ORIENTATION: MID
ORIENTATION: RIGHT;LEFT;MID
ORIENTATION: RIGHT
ORIENTATION: MID;UPPER
ORIENTATION: MID
ORIENTATION: MID
ORIENTATION: RIGHT

## 2023-01-01 ASSESSMENT — PULMONARY FUNCTION TESTS
PIF_VALUE: 26
PIF_VALUE: 25
PIF_VALUE: 24
PIF_VALUE: 25
PIF_VALUE: 27
PIF_VALUE: 28
PIF_VALUE: 27
PIF_VALUE: 26
PIF_VALUE: 24
PIF_VALUE: 27
PIF_VALUE: 27
PIF_VALUE: 24
PIF_VALUE: 28
PIF_VALUE: 27
PIF_VALUE: 25
PIF_VALUE: 26
PIF_VALUE: 28
PIF_VALUE: 26
PIF_VALUE: 25
PIF_VALUE: 27
PIF_VALUE: 26
PIF_VALUE: 24
PIF_VALUE: 27
PIF_VALUE: 28
PIF_VALUE: 27
PIF_VALUE: 25
PIF_VALUE: 25
PIF_VALUE: 39
PIF_VALUE: 26
PIF_VALUE: 25
PIF_VALUE: 29
PIF_VALUE: 26
PIF_VALUE: 25
PIF_VALUE: 25
PIF_VALUE: 26
PIF_VALUE: 28
PIF_VALUE: 26
PIF_VALUE: 27
PIF_VALUE: 26
PIF_VALUE: 28
PIF_VALUE: 29
PIF_VALUE: 26
PIF_VALUE: 26
PIF_VALUE: 28
PIF_VALUE: 25
PIF_VALUE: 27
PIF_VALUE: 26
PIF_VALUE: 28
PIF_VALUE: 26
PIF_VALUE: 26
PIF_VALUE: 29
PIF_VALUE: 27
PIF_VALUE: 25
PIF_VALUE: 26
PIF_VALUE: 27
PIF_VALUE: 26
PIF_VALUE: 26

## 2023-01-01 ASSESSMENT — ENCOUNTER SYMPTOMS
SHORTNESS OF BREATH: 1
SHORTNESS OF BREATH: 1
NAUSEA: 1
VOMITING: 1
ABDOMINAL PAIN: 1
COUGH: 0
ABDOMINAL PAIN: 1

## 2023-01-01 ASSESSMENT — PAIN - FUNCTIONAL ASSESSMENT
PAIN_FUNCTIONAL_ASSESSMENT: PREVENTS OR INTERFERES SOME ACTIVE ACTIVITIES AND ADLS
PAIN_FUNCTIONAL_ASSESSMENT: 0-10
PAIN_FUNCTIONAL_ASSESSMENT: PREVENTS OR INTERFERES SOME ACTIVE ACTIVITIES AND ADLS
PAIN_FUNCTIONAL_ASSESSMENT: PREVENTS OR INTERFERES SOME ACTIVE ACTIVITIES AND ADLS
PAIN_FUNCTIONAL_ASSESSMENT: NONE - DENIES PAIN
PAIN_FUNCTIONAL_ASSESSMENT: PREVENTS OR INTERFERES WITH MANY ACTIVE NOT PASSIVE ACTIVITIES
PAIN_FUNCTIONAL_ASSESSMENT: PREVENTS OR INTERFERES SOME ACTIVE ACTIVITIES AND ADLS
PAIN_FUNCTIONAL_ASSESSMENT: PREVENTS OR INTERFERES WITH MANY ACTIVE NOT PASSIVE ACTIVITIES
PAIN_FUNCTIONAL_ASSESSMENT: PREVENTS OR INTERFERES SOME ACTIVE ACTIVITIES AND ADLS
PAIN_FUNCTIONAL_ASSESSMENT: PREVENTS OR INTERFERES WITH MANY ACTIVE NOT PASSIVE ACTIVITIES

## 2023-01-01 ASSESSMENT — PAIN DESCRIPTION - FREQUENCY
FREQUENCY: CONTINUOUS

## 2023-01-01 ASSESSMENT — PAIN DESCRIPTION - PAIN TYPE
TYPE: CHRONIC PAIN
TYPE: CHRONIC PAIN
TYPE: ACUTE PAIN
TYPE: ACUTE PAIN
TYPE: CHRONIC PAIN;ACUTE PAIN
TYPE: CHRONIC PAIN
TYPE: ACUTE PAIN
TYPE: CHRONIC PAIN

## 2023-01-01 ASSESSMENT — PAIN DESCRIPTION - ONSET
ONSET: ON-GOING

## 2023-01-04 ENCOUNTER — INPATIENT HOSPITAL (OUTPATIENT)
Dept: URBAN - METROPOLITAN AREA HOSPITAL 56 | Facility: HOSPITAL | Age: 73
End: 2023-01-04
Payer: MEDICARE

## 2023-01-04 DIAGNOSIS — T40.605A ADVERSE EFFECT OF UNSPECIFIED NARCOTICS, INITIAL ENCOUNTER: ICD-10-CM

## 2023-01-04 DIAGNOSIS — R11.2 NAUSEA WITH VOMITING, UNSPECIFIED: ICD-10-CM

## 2023-01-04 DIAGNOSIS — Z98.84 BARIATRIC SURGERY STATUS: ICD-10-CM

## 2023-01-04 DIAGNOSIS — K59.03 DRUG INDUCED CONSTIPATION: ICD-10-CM

## 2023-01-04 PROCEDURE — 99222 1ST HOSP IP/OBS MODERATE 55: CPT | Performed by: PHYSICIAN ASSISTANT

## 2023-01-05 ENCOUNTER — INPATIENT HOSPITAL (OUTPATIENT)
Dept: URBAN - METROPOLITAN AREA HOSPITAL 56 | Facility: HOSPITAL | Age: 73
End: 2023-01-05
Payer: MEDICARE

## 2023-01-05 DIAGNOSIS — K59.03 DRUG INDUCED CONSTIPATION: ICD-10-CM

## 2023-01-05 DIAGNOSIS — Z98.84 BARIATRIC SURGERY STATUS: ICD-10-CM

## 2023-01-05 DIAGNOSIS — R11.2 NAUSEA WITH VOMITING, UNSPECIFIED: ICD-10-CM

## 2023-01-05 DIAGNOSIS — T40.605A ADVERSE EFFECT OF UNSPECIFIED NARCOTICS, INITIAL ENCOUNTER: ICD-10-CM

## 2023-01-05 PROCEDURE — 99232 SBSQ HOSP IP/OBS MODERATE 35: CPT | Performed by: PHYSICIAN ASSISTANT

## 2023-01-06 ENCOUNTER — INPATIENT HOSPITAL (OUTPATIENT)
Dept: URBAN - METROPOLITAN AREA HOSPITAL 56 | Facility: HOSPITAL | Age: 73
End: 2023-01-06
Payer: MEDICARE

## 2023-01-06 DIAGNOSIS — R11.2 NAUSEA WITH VOMITING, UNSPECIFIED: ICD-10-CM

## 2023-01-06 DIAGNOSIS — Z98.84 BARIATRIC SURGERY STATUS: ICD-10-CM

## 2023-01-06 PROCEDURE — 43235 EGD DIAGNOSTIC BRUSH WASH: CPT | Performed by: INTERNAL MEDICINE

## 2023-01-10 PROBLEM — A41.9 SEPSIS, UNSPECIFIED ORGANISM (HCC): Status: ACTIVE | Noted: 2023-01-01

## 2023-01-10 NOTE — CONSULTS
Name:  Jacob Salinas /Age/Sex: 1950  (67 y.o. female)   MRN & CSN:  [id number] & [id number] Admission Date/Time: 1/10/2023 11:34 AM   Location:  ED26/ED-26 PCP: [address] Day: 1          Referring physician:  No admitting provider for patient encounter. Reason for consultation: Atrial fibrillation and chest pain        Thanks for referral.    Information source: Patient    CC; chest pain      HPI:   Thank you for involving me in taking  care of Jacob Salinas who  is a 67 y. o.year  Old female  Presents with concerns regarding hematemesis also has been having chest pain and shortness of breath as well was noted to be in A. fib with RVR hence cardiology has been consulted patient denies any chest pain      , per records does not appear to have any significant cardiac history              Past medical history:    has a past medical history of CAD (coronary artery disease), Cataract, Dysphagia, Epiglottiditis, GERD (gastroesophageal reflux disease), Hematemesis, and Thyroid disease. Past surgical history:   has no past surgical history on file. Social History:     Family history:  family history is not on file.     Not on File    ondansetron (ZOFRAN) injection 4 mg, Q30 Min PRN  0.9 % sodium chloride infusion, Continuous  vancomycin (VANCOCIN) 1,250 mg in dextrose 5 % 250 mL IVPB ([id number]), Once  dilTIAZem 100 mg in dextrose 5 % 100 mL infusion (ADD-Hayti), Continuous      Current Facility-Administered Medications   Medication Dose Route Frequency Provider Last Rate Last Admin    ondansetron (ZOFRAN) injection 4 mg  4 mg IntraVENous Q30 Min PRN Yolande Bottom Benigno, PA   4 mg at 01/10/23 1153    0.9 % sodium chloride infusion   IntraVENous Continuous Yolande Bottom Benigno  mL/hr at 01/10/23 1328 New Bag at 01/10/23 1328    vancomycin (VANCOCIN) 1,250 mg in dextrose 5 % 250 mL IVPB ([id number])  1,250 mg IntraVENous Once Yolande Frannie Pelaez, .7 mL/hr at 01/10/23 1427 1,250 mg at 01/10/23 1427    dilTIAZem 100 mg in dextrose 5 % 100 mL infusion (ADD-Faison)  2.5-15 mg/hr IntraVENous Continuous RADHA Stock 7.5 mL/hr at 01/10/23 1454 7.5 mg/hr at 01/10/23 1454     Current Outpatient Medications   Medication Sig Dispense Refill    cyanocobalamin 1000 MCG/ML injection Inject 1,000 mcg into the muscle every 30 days Receives on the 5 th of each month      famotidine (PEPCID) 20 MG tablet Take 20 mg by mouth 2 times daily      promethazine (PHENERGAN) 25 MG/ML injection Inject 6.25 mg into the muscle every 6 hours as needed (nausea)      atorvastatin (LIPITOR) 20 MG tablet Take 20 mg by mouth nightly      vitamin E 100 units capsule Take 100 Units by mouth daily      aspirin (ASPIRIN 81) 81 MG EC tablet Take 81 mg by mouth daily      levothyroxine (SYNTHROID) 75 MCG tablet Take 75 mcg by mouth Daily      ferrous sulfate (IRON 325) 325 (65 Fe) MG tablet Take 325 mg by mouth daily (with breakfast)      amiodarone (CORDARONE) 200 MG tablet Take 400 mg by mouth nightly      polyethylene glycol (GLYCOLAX) 17 g packet Take 34 g by mouth daily      naloxegol (MOVANTIK) 25 MG TABS tablet Take 25 mg by mouth every morning (before breakfast)      Balsam Peru-Castor Oil (VENELEX) OINT ointment Apply topically 2 times daily Apply to buttock      gabapentin (NEURONTIN) 100 MG capsule Take 200 mg by mouth nightly.       senna (SENOKOT) 8.6 MG tablet Take 1 tablet by mouth nightly      pantoprazole (PROTONIX) 40 MG tablet Take 40 mg by mouth 2 times daily      docusate sodium (COLACE) 100 MG capsule Take 100 mg by mouth 2 times daily      polycarbophil (FIBERCON) 625 MG tablet Take 625 mg by mouth 2 times daily      tamsulosin (FLOMAX) 0.4 MG capsule Take 0.4 mg by mouth daily      potassium chloride (KLOR-CON M) 10 MEQ extended release tablet Take 10 mEq by mouth 2 times daily      HYDROcodone-acetaminophen (NORCO) 5-325 MG per tablet Take 1 tablet by mouth every 6 hours as needed for Pain. aluminum & magnesium hydroxide-simethicone (MAALOX) 200-200-20 MG/5ML SUSP suspension Take 30 mLs by mouth daily as needed for Indigestion      albuterol sulfate HFA (PROVENTIL;VENTOLIN;PROAIR) 108 (90 Base) MCG/ACT inhaler Inhale 2 puffs into the lungs every 6 hours as needed for Wheezing       Review of Systems:  All 14 systems reviewed, all negative except for chest pain shortness of breath    Physical Examination:    BP (!) 135/102   Pulse (!) 132   Temp 98.4 °F (36.9 °C) (Axillary)   Resp (!) 32   Ht 5' 4\" (1.626 m)   Wt 110 lb (49.9 kg)   SpO2 92%   BMI 18.88 kg/m²      Wt Readings from Last 3 Encounters:   01/10/23 110 lb (49.9 kg)     Body mass index is 18.88 kg/m². General Appearance: Fair  Head: normocephalic     Eyes: normal, noninjected conjunctiva    ENT: normal mucosa, noninjected throat, normal     NECK: No JVP  No thyromegaly        Cardiovascular: No thrills palpated   Auscultation: Normal S1 and S2, no murmur   carotid bruit no no   Abdominal Aorta no bruit    Respiratory:    Breath sounds clear clear    Extremities: Trace edema clubbing ,   n cyanosis    SKIN: Warm and well perfused, no pallor or cyanosis    Vascular exam:  Pedal Pulses: Nopalp bilaterally        Abdomen:  No masses or tenderness. No organomegaly noted. Neurological:  Oriented to time, place, and person   No focal neurological deficit noted.   Psychiatric:normal mood, no anxiety    Lab Review   Recent Results (from the past 24 hour(s))   EKG 12 Lead    Collection Time: 01/10/23 11:31 AM   Result Value Ref Range    Ventricular Rate 149 BPM    Atrial Rate 159 BPM    QRS Duration 84 ms    Q-T Interval 326 ms    QTc Calculation (Bazett) 513 ms    R Axis -41 degrees    T Axis 150 degrees    Diagnosis       Atrial fibrillation with rapid ventricular response  Left axis deviation  Anterolateral infarct , age undetermined  Abnormal ECG  No previous ECGs available     CBC with Auto Differential Collection Time: 01/10/23 11:44 AM   Result Value Ref Range    WBC 31.7 (HH) 4.0 - 10.5 K/CU MM    RBC 3.73 (L) 4.2 - 5.4 M/CU MM    Hemoglobin 11.3 (L) 12.5 - 16.0 GM/DL    Hematocrit 36.2 (L) 37 - 47 %    MCV 97.1 78 - 100 FL    MCH 30.3 27 - 31 PG    MCHC 31.2 (L) 32.0 - 36.0 %    RDW 17.3 (H) 11.7 - 14.9 %    Platelets 646 622 - 430 K/CU MM    MPV 10.8 7.5 - 11.1 FL    Differential Type AUTOMATED DIFFERENTIAL     Segs Relative 92.3 (H) 36 - 66 %    Lymphocytes % 2.6 (L) 24 - 44 %    Monocytes % 3.9 0 - 4 %    Eosinophils % 0.0 0 - 3 %    Basophils % 0.1 0 - 1 %    Segs Absolute 29.3 K/CU MM    Lymphocytes Absolute 0.8 K/CU MM    Monocytes Absolute 1.3 K/CU MM    Eosinophils Absolute 0.0 K/CU MM    Basophils Absolute 0.0 K/CU MM    Nucleated RBC % 0.0 %    Total Nucleated RBC 0.0 K/CU MM    Total Immature Neutrophil 0.34 K/CU MM    Immature Neutrophil % 1.1 (H) 0 - 0.43 %   Comprehensive Metabolic Panel    Collection Time: 01/10/23 11:44 AM   Result Value Ref Range    Sodium 138 135 - 145 MMOL/L    Potassium 4.2 3.5 - 5.1 MMOL/L    Chloride 98 (L) 99 - 110 mMol/L    CO2 9 (L) 21 - 32 MMOL/L    BUN 4 (L) 6 - 23 MG/DL    Creatinine 0.6 0.6 - 1.1 MG/DL    Est, Glom Filt Rate >60 >60 mL/min/1.73m2    Glucose 146 (H) 70 - 99 MG/DL    Calcium 9.7 8.3 - 10.6 MG/DL    Albumin 2.9 (L) 3.4 - 5.0 GM/DL    Total Protein 5.9 (L) 6.4 - 8.2 GM/DL    Total Bilirubin 0.5 0.0 - 1.0 MG/DL    ALT 38 10 - 40 U/L    AST 71 (H) 15 - 37 IU/L    Alkaline Phosphatase 84 40 - 128 IU/L    Anion Gap 31 (H) 4 - 16   Troponin    Collection Time: 01/10/23 11:44 AM   Result Value Ref Range    Troponin T 0.019 (H) <0.01 NG/ML   Brain Natriuretic Peptide    Collection Time: 01/10/23 11:44 AM   Result Value Ref Range    Pro-BNP 3,035 (H) <300 PG/ML   Lipase    Collection Time: 01/10/23 11:44 AM   Result Value Ref Range    Lipase 26 13 - 60 IU/L   Magnesium    Collection Time: 01/10/23 11:44 AM   Result Value Ref Range    Magnesium 2.0 1.8 - 2.4 mg/dl   Protime/INR & PTT    Collection Time: 01/10/23 11:44 AM   Result Value Ref Range    Protime 12.0 11.7 - 14.5 SECONDS    INR 0.93 INDEX    aPTT 29.9 25.1 - 37.1 SECONDS   TYPE AND SCREEN    Collection Time: 01/10/23 11:44 AM   Result Value Ref Range    ABO/Rh A POSITIVE     Antibody Screen NEGATIVE    Lactic Acid    Collection Time: 01/10/23 11:44 AM   Result Value Ref Range    Lactate 2.5 (HH) 0.5 - 1.9 mMOL/L   Urinalysis    Collection Time: 01/10/23 12:30 PM   Result Value Ref Range    Color, UA YELLOW YELLOW    Clarity, UA SLIGHTLY CLOUDY (A) CLEAR    Glucose, Urine NEGATIVE NEGATIVE MG/DL    Bilirubin Urine SMALL NUMBER OR AMOUNT OBSERVED (A) NEGATIVE MG/DL    Ketones, Urine >80 (A) NEGATIVE MG/DL    Specific Gravity, UA >1.030 1.001 - 1.035    Blood, Urine MODERATE NUMBER OR AMOUNT OBSERVED (A) NEGATIVE    pH, Urine 6.0 5.0 - 8.0    Protein, UA TRACE (A) NEGATIVE MG/DL    Urobilinogen, Urine 0.2 0.2 - 1.0 MG/DL    Nitrite Urine, Quantitative NEGATIVE NEGATIVE    Leukocyte Esterase, Urine SMALL NUMBER OR AMOUNT OBSERVED (A) NEGATIVE           Assessment/Recommendations:     -Patient is in atrial fibrillation with rapid ventricular response etiology unclear we will continue with negative chronotropic agent and anticoagulation  -Patient has leukocytosis possibly of sepsis hence will obtain an echocardiogram for further assessment and further work-up  -BNP level is elevated suggestive of volume overload           Renae Godinez MD, 1/10/2023 3:22 PM       Please note this report has been partially produced using speech recognition software and may contain errors related to that system including errors in grammar, punctuation, and spelling, as well as words and phrases that may be inappropriate. If there are any questions or concerns please feel free to contact the dictating provider for clarification.

## 2023-01-10 NOTE — ED NOTES
Medication History  [facility]    Patient Name: Марина Claros 1950     Medication history has been completed by: Ngozi Dupont CPhT    Source(s) of information: Medication list provided by [facility]     Primary Care Physician: [address]:     Allergies as of 01/10/2023    (Not on File)        Prior to Admission medications    Medication Sig Start Date End Date Taking? Authorizing Provider   cyanocobalamin 1000 MCG/ML injection Inject 1,000 mcg into the muscle every 30 days Receives on the 5 th of each month   Yes Historical Provider, MD   famotidine (PEPCID) 20 MG tablet Take 20 mg by mouth 2 times daily   Yes Historical Provider, MD   promethazine (PHENERGAN) 25 MG/ML injection Inject 6.25 mg into the muscle every 6 hours as needed   Yes Historical Provider, MD   atorvastatin (LIPITOR) 20 MG tablet Take 20 mg by mouth nightly   Yes Historical Provider, MD   vitamin E 100 units capsule Take 100 Units by mouth daily   Yes Historical Provider, MD   aspirin (ASPIRIN 81) 81 MG EC tablet Take 81 mg by mouth daily   Yes Historical Provider, MD   levothyroxine (SYNTHROID) 75 MCG tablet Take 75 mcg by mouth Daily   Yes Historical Provider, MD   ferrous sulfate (IRON 325) 325 (65 Fe) MG tablet Take 325 mg by mouth daily (with breakfast)   Yes Historical Provider, MD   amiodarone (CORDARONE) 200 MG tablet Take 400 mg by mouth nightly   Yes Historical Provider, MD   polyethylene glycol (GLYCOLAX) 17 g packet Take 34 g by mouth daily   Yes Historical Provider, MD   naloxegol (MOVANTIK) 25 MG TABS tablet Take 25 mg by mouth every morning (before breakfast)   Yes Historical Provider, MD   Balsam Peru-Castor Oil (VENELEX) OINT ointment Apply topically 2 times daily Apply to buttock   Yes Historical Provider, MD   gabapentin (NEURONTIN) 100 MG capsule Take 200 mg by mouth nightly.    Yes Historical Provider, MD   senna (SENOKOT) 8.6 MG tablet Take 1 tablet by mouth nightly   Yes Historical Provider, MD   pantoprazole (PROTONIX) 40 MG tablet Take 40 mg by mouth 2 times daily   Yes Historical Provider, MD   docusate sodium (COLACE) 100 MG capsule Take 100 mg by mouth 2 times daily   Yes Historical Provider, MD   polycarbophil (FIBERCON) 625 MG tablet Take 625 mg by mouth 2 times daily   Yes Historical Provider, MD   tamsulosin (FLOMAX) 0.4 MG capsule Take 0.4 mg by mouth daily   Yes Historical Provider, MD   potassium chloride (KLOR-CON M) 10 MEQ extended release tablet Take 10 mEq by mouth 2 times daily   Yes Historical Provider, MD   HYDROcodone-acetaminophen (NORCO) 5-325 MG per tablet Take 1 tablet by mouth every 6 hours as needed for Pain. Yes Historical Provider, MD   aluminum & magnesium hydroxide-simethicone (MAALOX) 200-200-20 MG/5ML SUSP suspension Take 30 mLs by mouth daily as needed for Indigestion   Yes Historical Provider, MD   albuterol sulfate HFA (PROVENTIL;VENTOLIN;PROAIR) 108 (90 Base) MCG/ACT inhaler Inhale 2 puffs into the lungs every 6 hours as needed for Wheezing   Yes Historical Provider, MD     Medications added or changed (ex.  new medication, dosage change, interval change, formulation change):  See medication list as stated above    Comments:  Medication list provided by [facility]     To my knowledge the above medication history is accurate as of 1/10/2023 12:30 PM.   Kim Hernandez CPhT   1/10/2023 12:30 PM

## 2023-01-10 NOTE — ED NOTES
Dr. Namita Springer with GI responded via [employer]    Dr. Hillary Esparza with Cardio responded via [place] [facility] AT [place], [address]  01/10/23 1771

## 2023-01-10 NOTE — CARE COORDINATION
I have personally seen and examined the patient, Judie Greene, and performed a substantive portion of the visit including all aspects of the medical decision making. I have reviewed the patient's available data,including medical history and recent test results. Reviewed and discussed note as documented by DARIUSZ. I agree with the physical exam findings, assessment and plan. In brief, patient with a H/O recurrent non-intractable nausea/vomiting, erosive esophagitis/ gastritis, GERD, HFrEF, afib, permanent pace maker, constipation, opiate dependence, presented with complaints of hematemesis from [facility]. Physical Exam  General:  Alert, oriented and mood affect appropriate. Ill appearing. Black tarry material on the clothe and paper towel and lips. Lungs:   Clear to auscultation bilaterally. Heart:  Regular rate and rhythm, S1, S2 stable, no murmur, click, rub or gallop. Abdomen:   Soft, non-tender. Bowel sounds present. No masses,  No organomegaly. Genitourinary: Continent   Neuro : PERRL. AO X 2   MSK:  No deformities   Skin:  No bruises or rashes     Assessment and Plan:  Hematemesis / dark coffee ground emesis 2/2 likely erosive esophagitis or gastritis per recent EGD. Suspected gastroesophageal junction mass per CT is unlikely. 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 Acute hypoxic respiratory failure likely 2/2 bilateral pneumonia. Likely aspiration pneumonia. Potentially mediastinitis as well, given [place] [facility] Bilateral pleural effusion, likely HF associated complicated by para-pneumonic effusion. HFrEF 35% per echo in 8/23/2022. Sepsis 2/2 pneumonia  HAGMA 2/2 lactic acidosis and starvation ketosis  Afib RVR, likely sepsis provoked. Hx of afib. Permanent pacemaker in place. Chest pain / NSTEMI  Constipation / impacted stool. ?Narcotic bowel. Chronic pain, opiate dependence    GI consulted from ED. Keep NPO for now. PPI BID.   Guarded use of IVF boluses as needed for hypotension in view of HFrEF. Antibiotic coverage with vanc and cefepime to be continued. Infectious workup with procal, CRP, respiratory culture, blood culture, MRSA nares. Consider IR consult for bilateral pleural effusion. Trend LA. Maintenance IVF. Repeat BMP now. Continue cardizem drip as long as blood pressure tolerates. If hypotensive use either amiodarone or digoxin. Trend troponin. Repeat EKG. Bowel regimen with enemas for now. She might need naloxegol or methylnaltreoxone. She was discharged from [facility] on naloxegol, polyethylene glycol, senna, polycarbophil. All diagnostic, treatment, and disposition decisions were made by myself in conjunction with the mid-level provider. For all further details of the patient's hospital course, please see the mid-level practitioner's documentation. Comment: Please note this report has been produced using speech recognition software and may contain errors related to that system including errors in grammar, punctuation, and spelling, as well as words and phrases that may be inappropriate. If there are any questions or concerns please feel free to contact the dictating provider for clarification.

## 2023-01-10 NOTE — ED NOTES
ED TO INPATIENT SBAR HANDOFF    Patient Name: Courtney Duff   :    67 y.o. MRN:  [id number]  Preferred Name    ED Room #:  ED26/ED-26  Family/Caregiver Present no   Restraints no   Sitter no   Sepsis Risk Score Sepsis Risk Score: 5.05    Situation  Code Status: No Order No additional code details. Allergies: Patient has no allergy information on record. Weight: Patient Vitals for the past 96 hrs (Last 3 readings):   Weight   01/10/23 1130 110 lb (49.9 kg)     Arrived from: nursing home  Chief Complaint:   Chief Complaint   Patient presents with    Hematemesis     2 x coffee ground    Chest Pain     Hospital Problem/Diagnosis:  Principal Problem:    Sepsis, unspecified organism [facility])  Resolved Problems:    * No resolved hospital problems. *    Imaging:   CT ABDOMEN PELVIS WO CONTRAST Additional Contrast? None   Final Result   Large partially calcified mass noted in the region of the GE junction. It   measures 10 x 6.6 x 9 cm. This may represent a leiomyosarcoma or GIST. Moderate bilateral pleural effusion with compressive atelectasis in lung   bases. Dense barium impaction of the rectum. Patient is status post cholecystectomy and hysterectomy. Calcified   granulomas noted in the spleen. Catalan catheter in the bladder. XR CHEST PORTABLE   Final Result   1. Cardiomegaly without overt failure. 2. Small bilateral pleural effusions with lower lobe airspace disease. I   would recommend follow-up to resolution.            Abnormal labs:   Abnormal Labs Reviewed   CBC WITH AUTO DIFFERENTIAL - Abnormal; Notable for the following components:       Result Value    WBC 31.7 (*)     RBC 3.73 (*)     Hemoglobin 11.3 (*)     Hematocrit 36.2 (*)     MCHC 31.2 (*)     RDW 17.3 (*)     Segs Relative 92.3 (*)     Lymphocytes % 2.6 (*)     Immature Neutrophil % 1.1 (*)     All other components within normal limits   COMPREHENSIVE METABOLIC PANEL - Abnormal; Notable for the following components:    Chloride 98 (*)     CO2 9 (*)     BUN 4 (*)     Glucose 146 (*)     Albumin 2.9 (*)     Total Protein 5.9 (*)     AST 71 (*)     Anion Gap 31 (*)     All other components within normal limits   TROPONIN - Abnormal; Notable for the following components:    Troponin T 0.019 (*)     All other components within normal limits   BRAIN NATRIURETIC PEPTIDE - Abnormal; Notable for the following components:    Pro-BNP 3,035 (*)     All other components within normal limits   URINALYSIS - Abnormal; Notable for the following components:    Clarity, UA SLIGHTLY CLOUDY (*)     Bilirubin Urine SMALL NUMBER OR AMOUNT OBSERVED (*)     Ketones, Urine >80 (*)     Blood, Urine MODERATE NUMBER OR AMOUNT OBSERVED (*)     Protein, UA TRACE (*)     Leukocyte Esterase, Urine SMALL NUMBER OR AMOUNT OBSERVED (*)     All other components within normal limits   LACTIC ACID - Abnormal; Notable for the following components:    Lactate 2.5 (*)     All other components within normal limits     Critical values: see above     Abnormal Assessment Findings: A fib rvr    Background  History:   Past Medical History:   Diagnosis Date    CAD (coronary artery disease)     Cataract     Dysphagia     Epiglottiditis     GERD (gastroesophageal reflux disease)     Hematemesis     Thyroid disease        Assessment    Vitals/MEWS: MEWS Score: 4  Level of Consciousness: Alert (0)   Vitals:    01/10/23 1428 01/10/23 1432 01/10/23 1602 01/10/23 1606   BP: (!) 124/101 (!) 135/102 (!) 124/96 (!) 124/96   Pulse: (!) 136 (!) 132 (!) 126 (!) 126   Resp: 28 (!) 32 29 29   Temp:   98 °F (36.7 °C) 98 °F (36.7 °C)   TempSrc:   Axillary Axillary   SpO2: 94% 92% 92% 92%   Weight:       Height:         FiO2 (%):   O2 Flow Rate: O2 Device: Nasal cannula O2 Flow Rate (L/min): 4 L/min  Cardiac Rhythm:    Pain Assessment:  [] Verbal [] Yamini [place] Scale  Pain Scale: Pain Assessment  Pain Assessment: None - Denies Pain  Pain Level: 9  Pain Location: Chest  Pain Orientation: Mid  Pain Descriptors: Pressure, Stabbing  Pain Type: Acute pain  Last documented pain score (0-10 scale) Pain Level: 9  Last documented pain medication administered: 1213  Mental Status: disoriented  NIH Score:    C-SSRS: Risk of Suicide: No Risk  Bedside swallow:    [place] Coma Scale (GCS): [place] Coma Scale  Eye Opening: Spontaneous  Best Verbal Response: Oriented  Best Motor Response: Obeys commands  [place] Coma Scale Score: 15  Active LDA's:   Peripheral IV 01/10/23 Left Wrist (Active)   Dressing Status New dressing applied 01/10/23 1151       Peripheral IV 01/10/23 Distal;Right; Anterior Basilic (Active)   Site Assessment Clean, dry & intact 01/10/23 1424   Line Status Brisk blood return;Flushed;Normal saline locked 01/10/23 1424   Line Care Connections checked and tightened 01/10/23 1424   Phlebitis Assessment No symptoms 01/10/23 1424   Infiltration Assessment 0 01/10/23 1424   Alcohol Cap Used Yes 01/10/23 1424   Dressing Status Clean, dry & intact 01/10/23 1424   Dressing Type Transparent 01/10/23 1424   Dressing Intervention New 01/10/23 1424     PO Status: Regular  Pertinent or High Risk Medications/Drips: yes   If Yes, please provide details: Diltiazem  Pending Blood Product Administration: no     You may also review the ED PT Care Timeline found under the Summary Nursing Index tab. Recommendation    Pending orders   Plan for Discharge (if known):    Additional Comments:  If any further questions, please call Sending RN at [phone]    Electronically signed by: Electronically signed by Clara Silva RN on 1/10/2023 at [address], RN  01/10/23 [id number]

## 2023-01-10 NOTE — ED PROVIDER NOTES
I independently examined and evaluated Cara Raymundo. In brief, 23-year-old female presented with concern for hematemesis. [facility], when asked she thinks she is on blood thinners but when we review her chart it does not appear that she is other than aspirin. She did have an episode here of dark coffee-ground emesis. She is in A. fib with RVR. She is complaining of some chest pain, no shortness of breath. She is on nasal cannula which is not her baseline. Symptoms started just prior to arrival.  No abdominal pain. No known history of GI bleed. Focused exam revealed patient on cot, tachycardic with a regular rate and rhythm. Blood pressure is stable, she is awake and oriented for me, abdomen soft and nondistended, she does have some coffee-ground emesis in a bag. .    ED course: We are concern for likely upper GI bleed, she is in A. fib with RVR on arrival.  Starting with fluids, nausea meds and pain meds and then reassessing. Blood pressure in the 130s, we will start diltiazem if needed but I do want a get some fluids on board to make sure we do not drop her preload starting work-up for a GI bleed in addition to cardiac labs given her presentation, EKG with no ST elevation, appears to be A. fib with RVR    EKG interpreted by me  Atrial fibrillation with rapid ventricular response, rate of 149 bpm.  No ST elevation. No previous to compare    1219- noted questionable lower airspace disease on CXR, with significant leukocytosis, concern for possible sepsis now so blood cultures are added in addition to cefepime and vanc. She is already receiving a bolus of fluids- does have pleural effusions so will hold off on more at this time and monitor her respiratory status closely. Awaiting CT a/p as well given this leukocytosis.     Creatinine appears to be stable, her hemoglobin is 11.3 with a leukocytosis, has mild lactic acidosis which could just be greatly related versus also could be related to a GI bleed. We are giving her a dose of diltiazem 10 mg to see if we can slow her rate at this time, and if her blood pressure tolerates that then we will start a drip. Patient's bicarb is very low, glucose is 146 so less likely related to her diabetes, suspect more related to sepsis and GI bleed, venous blood gas ordered as well. 1325-patient's blood pressure is remaining stable, heart rate did improve into the 100s to 110 range initially but did still continue to have rate 115s-120s. As blood pressure remains stable we will start diltiazem drip, discussed with RADHA Pelaez. We did have pharmacy perform medication reconciliation it does not appear she is on blood thinners at the nursing home. We are not starting blood thinners given the obvious concern for GI bleed. 1408- patient's CT a/p   Large partially calcified mass noted in the region of the GE junction. It   measures 10 x 6.6 x 9 cm. This may represent a leiomyosarcoma or GIST. Moderate bilateral pleural effusion with compressive atelectasis in lung   bases. No evidence of obstruction on CT, no acute surgical abnormality at this time, but will certainly need admission for GI bleed with new stomach mass and will need GI evaluation. HR improving with diltiazem and her BP is tolerating this, holding off on more fluids given the pleural effusions at this time. Cultures are pending. Plan for GI consult and admission. Protonix 80 mg IV already given, will plan for 40mg IV BID. No evidence of varices or previous cirrhosis/liver disease to suggest need for octreotide, will trend Hgb as inpatient to determine if needs transfusion, does not require one at this time. May require cardiology consult regarding her afib as well. All diagnostic, treatment, and disposition decisions were made by myself in conjunction with the advanced practice provider.     I personally saw the patient and performed a substantive portion of the visit including all aspects of the medical decision making. I personally saw the patient and independently provided 40 minutes of non-concurrent critical care of the total shared critical care time provided for GI bleed, afib with RVR. For all further details of the patient's emergency department visit, please see the advanced practice provider's documentation. Comment: Please note this report has been produced using speech recognition software and may contain errors related to that system including errors in grammar, punctuation, and spelling, as well as words and phrases that may be inappropriate. If there are any questions or concerns please feel free to contact the dictating provider for clarification.        Diana Helton MD  01/10/23 1834

## 2023-01-10 NOTE — ED NOTES
1653 paged Dr Jacobs Post  01/10/23 1652    1700 Dr Elsie Canela in ED.       [facility]  01/10/23 170

## 2023-01-10 NOTE — CONSULTS
Consult reviewed with primary nurse. Extended Dwell to be placed d/t limited access and critical medications. Education regarding placement of Extended Dwell catheter discussed with Patient along with benefits and risks. Consent given by Patient. Arrow Endurance Extended Dwell Midline 20g/8cm catheter placed in ROXY using sterile ultrasound-guided technique. Placement verified via ultrasound visualization of catheter within the vessel lumen. Catheter returns blood briskly and flushes with ease and no abnormalities noted. Nexiva 20g 1.75 inch catheter placed in patient's FAUSTO without difficulty via ultrasound. Patient tolerated well. Please consult IV/PICC Team for any questions or if patient's needs change.

## 2023-01-10 NOTE — H&P
V2.0  History and Physical      Name:  Seymour Tatum /Age/Sex: 1950  (67 y.o. female)   MRN & CSN:  [id number] & [id number] Encounter Date/Time: 1/10/2023 4:18 PM EST   Location:  ED26/ED-26 PCP: Atif Velarde Day: 1    Assessment and Plan:   Seymour Tatum is a 67 y.o. female with a past medical history of atrial fibrillation, thyroid disease, GERD, hyperlipidemia, iron deficiency anemia, OA, dysphagia, CAD status post CABG who presents with Sepsis, unspecified organism ([address]      Sepsis secondary to possible pneumonia likely aspiration: CT A/P demonstrates bilateral pleural effusions with compressive atelectasis. Recent EGD  at [facility] concern for possible Perforation vs concern for mediastinitis. -Inpatient intermediate care labeled telemetry monitoring  - sepsis evidenced by , RR32, T max 98.4, WBC 31.7, Lactic acid 2.5, /96  - UA moderate blood, small leukocytes, CXR cardiomegaly. Small bilateral pleural effusions with lower lobe airspace disease., CT A/P large partially calcified mass  Region of the GE junction. Patient has history of Kellie-en-Y surgery, moderate bilateral pleural effusions with compressive atelectasis. .  Impaction of rectum. - Hemodynamically stable   -1 L bolus given in ER  - continue IVF, abx cefepime and vancomycin  -Pharmacy consult for Vanco dosing  - trend LA, inflammatory makers, UCx, BCx pending (procalcitonin, CRP)  -ABG pending    Atrial fibrillation with rapid ventricular response- CHADS Vasc score- 4  Likely 2/2 to Sepsis  EKG demonstrates atrial fibrillation with RVR , heart rate 159, ventricular 149 , QTc 513. Patient  undergone multiple cardioversions, cryoablation. Patient is not actively on [facility] due to upper GI bleeds. Outpatient regimen includes amiodarone 20 mg daily.   Continue per cardiology recommendations on admission on Cardizem drip.  -Pt is asymptomatic and hemodynamically stable  -Cardizem bolus of 10 mg given in ER  -Continue Cardizem drip    -Target HR <110 to avoid tachycardia-mediated cardiomyopathy  -1L NS bolus-completed in ER  -Check 2D echocardiogram in AM  -Check TSH, FT4, Mg, electrolytes  -serial cardiac enzymes  -cardiologic consultation  -Troponin admission 0.19    Acute respiratory failure with hypoxia: Patient currently on 5 L nasal cannula admission. Possibly secondary to aspiration pneumonia. Patient's history of combined CHF episode. Imaging showed bilateral pleural effusions.  -Continuous pulse ox  -Oxygen protocol  -Consider IR intervention for possible thoracentesis    Abdominal pain with hematemesis:   Upper GI bleeding: Coffee-ground emesis  Recent EGD-concern for perforation versus mediastinitis  Patient presents to ER with coffee-ground vomitus. Patient received a recent EGD on 1/6/2023 at [address] Results-showed no esophageal stricture, no evidence of esophagitis or scarring. Normal RNY gastric pouch anatomy. No anastomosis stricture. Prior EGD showed distal esophageal ischemic necrosis. She also necrotic bowel.  -Continue with small diet, minced and moist.  Once cleared by GI currently n.p.o. on admission  -Continue sit upright  -Consider nutrition consult after further evaluation. Per ECF records patient is on level 5 minced moist diet. All meals  -CT showed barium impaction of the rectum. Ordered soapsuds enema. Patient is on naloxegol as OP. Held on admission, patient reports loose bowel movements. Continue when able. -NPO on admission  further diet recommendations per GI  -Morphine 2 mg  IV every 4 hours. Prn . High anion gap metabolic acidosis: AG 31.  CO2 9. Lactic acid 2.5  -ABG pending on admission  -MIVF  -Repeat BMP pending on admission    Chronic systolic and diastolic heart failure. Last echo7/28/2022-EF 30 to 35%, grade 2 DD. Chest x-ray demonstrates small bilateral pleural effusions with lower lobe airspace disease.   [id number]  -Patient given 1 L bolus in ER  -Monitor IV fluid resuscitation  -Chest x-ray demonstrates bilateral pleural effusions  -Consider IR for consideration of thoracentesis  -2D echo pending    Essential hypertension: Not on outpatient regimen.  -Monitor blood pressure trends    Hyperlipidemia: Continue atorvastatin at bedtime    Hypothyroidism: Continue levothyroxine daily    Chronic microcytic anemia-on admission H&H-11.3/36.2  History of B12, iron deficiency. Likely 2/2 Kellie-en-Y gastric bypass.  -Continue iron supplementation  -Monitor H&H    Chronic pain- opiate dependence.  -Continue gabapentin patient to take p.o. Chronic Conditions: Continue all home medications except as stated above or contraindicated. BMI 18.88: Protein calorie malnutrition. Patient has history of Kellie-en-Y bypass surgery. Current diet consists of level 5 minced and moist.  -Follow-up PCP for longitudinal care    This patient was seen and examined in conjunction with Dr. Marquita Duncan. He was agreeable with the plan and management as dictated above. Hospital Problems             Last Modified POA    * (Principal) Sepsis, unspecified organism ([place]) 1/10/2023 Yes       Disposition:   Current Living situation: ECF  Expected Disposition: ECF  Estimated D/C: 3 to 4 days    Diet NPO   DVT Prophylaxis [] Lovenox, []  Heparin, [x] SCDs, [] Ambulation,  [] Eliquis, [] Xarelto   Code Status Full   Surrogate Decision Maker/ POA Spouse -Julio. History from:     patient, electronic medical record, Quality of history:  poor historian      History of Present Illness:     Chief Complaint: Sepsis, unspecified organism ([place])  Will Isidro is a 67 y.o. female who presents to the emergency department via EMS from [address] in for complaint of melanotic coffee-ground emesis, abdominal pain, chest pain. Patient is alert and oriented to self and location. She is a poor historian of her medical condition.   Patient states she has been vomiting for forever. Endorses abdominal pain, loose dark stools. Patient is noted to have black crust around her lips. Patient has a history of atrial fibrillation controlled on amiodarone and is not on anticoagulation due to history of upper GI bleed. She recently had an EGD at [facility] on 1/6. This showed no esophageal stricture or abnormalities. She does have history of Kellie-en-Y gastric bypass. Patient primarily follows with [address] recently had EGD with Dr. Edda Rodriguez ER attempted to transfer patient [facility] is not currently excepting patients due to bed status. Patient does endorse shortness of breath, chest pain, abdominal pain nausea and vomiting. She does complain of chronic leg pain. No Family members present at bedside at time of admission. Review of Systems:   Review of Systems   Constitutional:  Negative for chills and fever. HENT:  Negative for congestion. Respiratory:  Positive for shortness of breath. Negative for cough. Cardiovascular:  Positive for chest pain and palpitations. Negative for leg swelling. Gastrointestinal:  Positive for abdominal pain, nausea and vomiting. Hematemesis   Genitourinary: Catalan cath in place. Musculoskeletal:  Positive for gait problem. Nonambulatory, chronic leg pain   Skin: Negative. Neurological:  Negative for dizziness and weakness. Psychiatric/Behavioral: Negative. Objective:   No intake or output data in the 24 hours ending 01/10/23 1618   Vitals:   Vitals:    01/10/23 1428 01/10/23 1432 01/10/23 1602 01/10/23 1606   BP: (!) 124/101 (!) 135/102 (!) 124/96 (!) 124/96   Pulse: (!) 136 (!) 132 (!) 126 (!) 126   Resp: 28 (!) 32 29 29   Temp:   98 °F (36.7 °C) 98 °F (36.7 °C)   TempSrc:   Axillary Axillary   SpO2: 94% 92% 92% 92%   Weight:       Height:           Medications Prior to Admission     Prior to Admission medications    Medication Sig Start Date End Date Taking?  Authorizing Provider cyanocobalamin 1000 MCG/ML injection Inject 1,000 mcg into the muscle every 30 days Receives on the 5 th of each month   Yes Historical Provider, MD   famotidine (PEPCID) 20 MG tablet Take 20 mg by mouth 2 times daily   Yes Historical Provider, MD   promethazine (PHENERGAN) 25 MG/ML injection Inject 6.25 mg into the muscle every 6 hours as needed (nausea)   Yes Historical Provider, MD   atorvastatin (LIPITOR) 20 MG tablet Take 20 mg by mouth nightly   Yes Historical Provider, MD   vitamin E 100 units capsule Take 100 Units by mouth daily   Yes Historical Provider, MD   aspirin (ASPIRIN 81) 81 MG EC tablet Take 81 mg by mouth daily   Yes Historical Provider, MD   levothyroxine (SYNTHROID) 75 MCG tablet Take 75 mcg by mouth Daily   Yes Historical Provider, MD   ferrous sulfate (IRON 325) 325 (65 Fe) MG tablet Take 325 mg by mouth daily (with breakfast)   Yes Historical Provider, MD   amiodarone (CORDARONE) 200 MG tablet Take 400 mg by mouth nightly   Yes Historical Provider, MD   polyethylene glycol (GLYCOLAX) 17 g packet Take 34 g by mouth daily   Yes Historical Provider, MD   naloxegol (MOVANTIK) 25 MG TABS tablet Take 25 mg by mouth every morning (before breakfast)   Yes Historical Provider, MD   Balsam [place]-Castor Oil (VENELEX) OINT ointment Apply topically 2 times daily Apply to buttock   Yes Historical Provider, MD   gabapentin (NEURONTIN) 100 MG capsule Take 200 mg by mouth nightly.    Yes Historical Provider, MD   senna (SENOKOT) 8.6 MG tablet Take 1 tablet by mouth nightly   Yes Historical Provider, MD   pantoprazole (PROTONIX) 40 MG tablet Take 40 mg by mouth 2 times daily   Yes Historical Provider, MD   docusate sodium (COLACE) 100 MG capsule Take 100 mg by mouth 2 times daily   Yes Historical Provider, MD   polycarbophil (FIBERCON) 625 MG tablet Take 625 mg by mouth 2 times daily   Yes Historical Provider, MD   tamsulosin (FLOMAX) 0.4 MG capsule Take 0.4 mg by mouth daily   Yes Historical Provider, MD potassium chloride (KLOR-CON M) 10 MEQ extended release tablet Take 10 mEq by mouth 2 times daily   Yes Historical Provider, MD   HYDROcodone-acetaminophen (NORCO) 5-325 MG per tablet Take 1 tablet by mouth every 6 hours as needed for Pain. Yes Historical Provider, MD   aluminum & magnesium hydroxide-simethicone (MAALOX) 200-200-20 MG/5ML SUSP suspension Take 30 mLs by mouth daily as needed for Indigestion   Yes Historical Provider, MD   albuterol sulfate HFA (PROVENTIL;VENTOLIN;PROAIR) 108 (90 Base) MCG/ACT inhaler Inhale 2 puffs into the lungs every 6 hours as needed for Wheezing   Yes Historical Provider, MD       Physical Exam:       GEN Awake female, sitting upright in bed in no apparent distress. Appears given age. EYES   No scleral erythema, discharge, or conjunctivitis. HENT Mucous membranes are moist.  NECK Supple  RESP decreased breath sounds, most notable in bilateral bases. No rales crackles or rhonchi noted. Respirations even and unlabored on RA. CARDIO/VASC    irregular rate, tachycardic, trace peripheral edema  GI Abdomen is soft without significant tenderness, masses, or guarding.   Catalan catheter is present. MSK No gross joint deformities. SKIN Normal coloration, warm, dry. NEURO Cranial nerves appear grossly intact, normal speech, no lateralizing weakness. PSYCH Awake, alert, oriented x 2. Affect appropriate.       Past Medical History:   PMHx   Past Medical History:   Diagnosis Date    CAD (coronary artery disease)     s/p cabg    Cataract     Dysphagia     Epiglottiditis     GERD (gastroesophageal reflux disease)     GERD (gastroesophageal reflux disease)     Hematemesis     History of GI bleed     HLD (hyperlipidemia)     Iron deficiency     Mitral regurgitation     torn Chordae    Non-rheumatic mitral valve disease     OA (osteoarthritis)     PAF (paroxysmal atrial fibrillation) (HCC)     Thyroid disease     VRE (vancomycin resistant enterococcus) culture positive 11/29/2022 Arrhythmia   Afib, on Xarelto. Per Dr Randi Padilla note, successful Cardioversion 4-21. Bradycardic, fatigued, Amiodarone decreased and pt went back into Afib. Arthritis   Blood transfusion without reported diagnosis   CAD in native artery   Chronic anticoagulation   Diastolic CHF, chronic (HCC)   Essential hypertension, benign   Dr Randi Padilla   Exercise tolerance finding 04/09/2021   Has SOB with strenuous activity but no reports of CP. States has random episodes of chest pain and will take nitro when needed. GERD (gastroesophageal reflux disease)   H/O mitral valve repair   Hiatal hernia   Hyperlipidemia   Hypothyroidism   Iron deficiency   Left atrial enlargement   Mitral regurgitation   Due to torn Chordae   Non-rheumatic mitral valve stenosis   Obesity   On home O2 04/09/2021   not using lately per pt, normally 2L at HS   Osteoarthritis   Post-resection malabsorption   S/P ablation of atrial fibrillation 12/06/2018   Dr. Kim Sensing   S/P CABG (coronary artery bypass graft) 06/07/2012   with Mitral Valve Repair: [facility] to [facility]   Shingles 2012   Substernal chest pain   Typical atrial flutter (HCC)   PAF, Aflutter   VRE (vancomycin resistant enterococcus) culture positive 11/29/2022   urine cx       PSHX:   Procedure Laterality Date   APPENDECTOMY   BREAST NEEDLE BIOPSY Left 04/04/2017   benign   CARDIAC CATHERIZATION   CARDIAC VALVE REPLACEMENT 2012   MITRAL VALVE REPAIR   CATARACT REMOVAL 08/15/2018   right   CHOLECYSTECTOMY   COLONOSCOPY 2013   CORONARY ARTERY BYPASS GRAFT 06/06/2012   X1   GASTRIC BYPASS SURGERY   20 years ago   HYSTERECTOMY   JOINT REPLACEMENT Bilateral   TKR   KNEE SURGERY   ARTHROSCOPY - RIGHT   KNEE SURGERY Left 2014   SD ESOPHAGOGASTRODUODENOSCOPY TRANSORAL DIAGNOSTIC N/A 7/12/2022   ESOPHAGOGASTRODUODENOSCOPY (EGD) performed by Jun Lester MD at [address] [place] [address] ENDOSCOPY     Allergies:    Allergies   Allergen Reactions    Buprenorphine Other (See Comments)     Fam HX: No known significant family medical history  Soc HX:   Social History     Socioeconomic History    Marital status:        Medications:   Medications:    Infusions:    sodium chloride 150 mL/hr at 01/10/23 1328    dilTIAZem 12.5 mg/hr (01/10/23 1616)     PRN Meds: ondansetron, 4 mg, Q30 Min PRN        Labs    CBC:   Recent Labs     01/10/23  1144   WBC 31.7*   HGB 11.3*        BMP:    Recent Labs     01/10/23  1144      K 4.2   CL 98*   CO2 9*   BUN 4*   CREATININE 0.6   GLUCOSE 146*     Hepatic:   Recent Labs     01/10/23  1144   AST 71*   ALT 38   BILITOT 0.5   ALKPHOS 84     Lipids: No results found for: CHOL, HDL, TRIG  Hemoglobin A1C: No results found for: LABA1C  TSH: No results found for: TSH  Troponin:   Lab Results   Component Value Date/Time    TROPONINT 0.019 01/10/2023 11:44 AM     Lactic Acid: No results for input(s): LACTA in the last 72 hours.   BNP:   Recent Labs     01/10/23  1144   PROBNP 3,035*     UA:  Lab Results   Component Value Date/Time    NITRU NEGATIVE 01/10/2023 12:30 PM    COLORU YELLOW 01/10/2023 12:30 PM    CLARITYU SLIGHTLY CLOUDY 01/10/2023 12:30 PM    SPECGRAV >1.030 01/10/2023 12:30 PM    LEUKOCYTESUR SMALL NUMBER OR AMOUNT OBSERVED 01/10/2023 12:30 PM    UROBILINOGEN 0.2 01/10/2023 12:30 PM    BILIRUBINUR SMALL NUMBER OR AMOUNT OBSERVED 01/10/2023 12:30 PM    BLOODU MODERATE NUMBER OR AMOUNT OBSERVED 01/10/2023 12:30 PM    KETUA >80 01/10/2023 12:30 PM     Urine Cultures: No results found for: Ferol Sieve  Blood Cultures: No results found for: BC  No results found for: BLOODCULT2  Organism: No results found for: ORG    Imaging/Diagnostics Last 24 Hours   CT ABDOMEN PELVIS WO CONTRAST Additional Contrast? None    Result Date: 1/10/2023  EXAMINATION: CT OF THE ABDOMEN AND PELVIS WITHOUT CONTRAST 1/10/2023 1:42 pm TECHNIQUE: CT of the abdomen and pelvis was performed without the administration of intravenous contrast. Multiplanar reformatted images are provided for review. Automated exposure control, iterative reconstruction, and/or weight based adjustment of the mA/kV was utilized to reduce the radiation dose to as low as reasonably achievable. COMPARISON: None. HISTORY: ORDERING SYSTEM PROVIDED HISTORY: abdominal pain, hematemesis TECHNOLOGIST PROVIDED HISTORY: Reason for exam:->abdominal pain, hematemesis Additional Contrast?->None Decision Support Exception - unselect if not a suspected or confirmed emergency medical condition->Emergency Medical Condition (MA) Reason for Exam: abdominal pain, hematemesis FINDINGS: Lower Chest: There is a partially calcified soft tissue mass noted in the GE junction. It measures 10 x 6.6 x 9 cm. The appearance suggests a leiomyosarcoma or GIST. There are moderate bilateral pleural effusions with compressive atelectasis in the lung bases. Patient is status post sternotomy. Pacer leads are in good position. Organs: The the liver, pancreas appear unremarkable. There are calcified granulomas in the the spleen. The patient is status post cholecystectomy. The adrenal glands and kidneys are unremarkable. GI/Bowel: Small bowel loops appear unremarkable. There is impaction of the rectum with dense barium. Pelvis: There is a Catalan catheter in the bladder. The patient is status post hysterectomy. Peritoneum/Retroperitoneum: Unremarkable Bones/Soft Tissues: Osteoarthritic changes are noted in lower lumbar facet joints. Large partially calcified mass noted in the region of the GE junction. It measures 10 x 6.6 x 9 cm. This may represent a leiomyosarcoma or GIST. Moderate bilateral pleural effusion with compressive atelectasis in lung bases. Dense barium impaction of the rectum. Patient is status post cholecystectomy and hysterectomy. Calcified granulomas noted in the spleen. Catalan catheter in the bladder.      XR CHEST PORTABLE    Result Date: 1/10/2023  EXAMINATION: ONE XRAY VIEW OF THE CHEST 1/10/2023 11:50 am COMPARISON: None. HISTORY: ORDERING SYSTEM PROVIDED HISTORY: Chest pain TECHNOLOGIST PROVIDED HISTORY: Reason for exam:->Chest pain Reason for Exam: chest pain FINDINGS: There are postsurgical changes of median sternotomy. The heart size is enlarged. The pulmonary vasculature is within normal limits. There is increased density involving the lower lung zones bilaterally with blunting of the costophrenic angles. There is a left subclavian AICD. 1. Cardiomegaly without overt failure. 2. Small bilateral pleural effusions with lower lobe airspace disease. I would recommend follow-up to resolution. I discussed this patient with the ED provider and Dr. Marquita Duncan. I did a review of patient's medical records, lab results and imaging conducted today. I personally reviewed patient's vital signs including pulse ox and EKG atrial fibrillation with RVR.      Electronically signed by MIA Martinez CNP on 1/10/2023 at 4:18 PM

## 2023-01-10 NOTE — ED PROVIDER NOTES
[address] ENCOUNTER        Pt Name: Cara Raymundo  MRN: [id number]  [place] 1950  Date of evaluation: 1/10/2023  Provider: RADHA De La Cruz  PCP: Soo Holt     I have seen and evaluated this patient with my supervising physician Emmanuelle Garcia MD.      [address] 48.       Chief Complaint   Patient presents with    Hematemesis     2 x coffee ground    Chest Pain         HISTORY OF PRESENT ILLNESS      Chief Complaint: Hematemesis, abdominal pain, chest pain    Cara Raymundo is a 67 y.o. female who presents to the emergency department today via EMS from [facility] with melanotic, dark-colored vomit, abdominal pain, chest pain. Patient is alert oriented sitting up, answering questions. She initially said that she is on blood thinning medication but on chart/medication review from the [facility] she is on aspirin without any other anticoagulants. She does have history of GI bleeding according to her past medical history. She has history of atrial fibrillation as well. There is evidence of \"dark vomit around the periorbital area. She does complain of some mild abdominal pain, states that she has been having bowel movements. No other fevers or chills. Nursing Notes were all reviewed and agreed with or any disagreements were addressed in the HPI. REVIEW OF SYSTEMS     Constitutional:   Denies fever, chills, weight loss or weakness   HENT:   Denies sore throat or ear pain   Cardiovascular:   See HPI  Respiratory:  Denies cough or shortness of breath    GI: See HPI  :  Denies any urinary symptoms or vaginal symptoms.    Musculoskeletal:   Denies back pain  Skin:   Denies rash  Neurologic:   Denies headache, focal weakness or sensory changes   Endocrine:  Denies polyuria or polydypsia   Lymphatic:  Denies swollen glands     PAST MEDICAL HISTORY     Past Medical History:   Diagnosis Date    CAD (coronary artery disease)     Cataract     Dysphagia     Epiglottiditis     GERD (gastroesophageal reflux disease)     Hematemesis     Thyroid disease        SURGICAL HISTORY   No past surgical history on file. CURRENTMEDICATIONS       Previous Medications    ALBUTEROL SULFATE HFA (PROVENTIL;VENTOLIN;PROAIR) 108 (90 BASE) MCG/ACT INHALER    Inhale 2 puffs into the lungs every 6 hours as needed for Wheezing    ALUMINUM & MAGNESIUM HYDROXIDE-SIMETHICONE (MAALOX) 200-200-20 MG/5ML SUSP SUSPENSION    Take 30 mLs by mouth daily as needed for Indigestion    AMIODARONE (CORDARONE) 200 MG TABLET    Take 400 mg by mouth nightly    ASPIRIN (ASPIRIN 81) 81 MG EC TABLET    Take 81 mg by mouth daily    ATORVASTATIN (LIPITOR) 20 MG TABLET    Take 20 mg by mouth nightly    BALSAM PERU-CASTOR OIL (VENELEX) OINT OINTMENT    Apply topically 2 times daily Apply to buttock    CYANOCOBALAMIN 1000 MCG/ML INJECTION    Inject 1,000 mcg into the muscle every 30 days Receives on the 5 th of each month    DOCUSATE SODIUM (COLACE) 100 MG CAPSULE    Take 100 mg by mouth 2 times daily    FAMOTIDINE (PEPCID) 20 MG TABLET    Take 20 mg by mouth 2 times daily    FERROUS SULFATE (IRON 325) 325 (65 FE) MG TABLET    Take 325 mg by mouth daily (with breakfast)    GABAPENTIN (NEURONTIN) 100 MG CAPSULE    Take 200 mg by mouth nightly. HYDROCODONE-ACETAMINOPHEN (NORCO) 5-325 MG PER TABLET    Take 1 tablet by mouth every 6 hours as needed for Pain.     LEVOTHYROXINE (SYNTHROID) 75 MCG TABLET    Take 75 mcg by mouth Daily    NALOXEGOL (MOVANTIK) 25 MG TABS TABLET    Take 25 mg by mouth every morning (before breakfast)    PANTOPRAZOLE (PROTONIX) 40 MG TABLET    Take 40 mg by mouth 2 times daily    POLYCARBOPHIL (FIBERCON) 625 MG TABLET    Take 625 mg by mouth 2 times daily    POLYETHYLENE GLYCOL (GLYCOLAX) 17 G PACKET    Take 34 g by mouth daily    POTASSIUM CHLORIDE (KLOR-CON M) 10 MEQ EXTENDED RELEASE TABLET Take 10 mEq by mouth 2 times daily    PROMETHAZINE (PHENERGAN) 25 MG/ML INJECTION    Inject 6.25 mg into the muscle every 6 hours as needed (nausea)    SENNA (SENOKOT) 8.6 MG TABLET    Take 1 tablet by mouth nightly    TAMSULOSIN (FLOMAX) 0.4 MG CAPSULE    Take 0.4 mg by mouth daily    VITAMIN E 100 UNITS CAPSULE    Take 100 Units by mouth daily       ALLERGIES     Buprenorphine    FAMILYHISTORY     No family history on file. SOCIAL HISTORY       Social History     Socioeconomic History    Marital status:        SCREENINGS    Clotilde Coma Scale  Eye Opening: Spontaneous  Best Verbal Response: Oriented  Best Motor Response: Obeys commands  Clotilde Coma Scale Score: 15      PHYSICAL EXAM       ED Triage Vitals   BP Temp Temp Source Heart Rate Resp SpO2 Height Weight   01/10/23 1130 01/10/23 1202 01/10/23 1202 01/10/23 1130 01/10/23 1130 01/10/23 1130 01/10/23 1130 01/10/23 1130   (!) 137/111 98.4 °F (36.9 °C) Axillary (!) 149 20 95 % 5' 4\" (1.626 m) 110 lb (49.9 kg)      Constitutional:  Well developed, Well nourished. No distress  HENT:  Normocephalic, Atraumatic, PERRL. EOMI. Sclera clear. Conjunctiva normal, No discharge. On inspection of the perioral there is evidence of dark vomit in the mouth. Neck/Lymphatics: supple, no JVD, no swollen nodes  Cardiovascular:   Regularly irregular heart rhythm, tachycardic rate, no murmurs rubs or gallop  Respiratory:   Nonlabored breathing. Normal breath sounds, No wheezing  Abdomen: Bowel sounds present, no obvious distention, abdomen soft but does demonstrate tenderness into the lower portion of the abdomen on palpation. No peritoneal signs no significant guarding no pulsatile sima  Musculoskeletal:    There is no edema, asymmetry, or calf / thigh tenderness bilaterally. No cyanosis. No cool or pale-appearing limb.   Distal cap refill and pulses intact bilateral upper and lower extremities  Bilateral upper and lower extremity ROM intact without pain or obvious deficit  Integument:   Warm, Dry  Neurologic:  Alert & oriented , No focal deficits noted. Cranial nerves II through XII grossly intact. Normal gross motor coordination & motor strength bilateral upper and lower extremities  Sensation intact.   Psychiatric:  Affect normal, Mood normal.     DIAGNOSTIC RESULTS   LABS:    Labs Reviewed   CBC WITH AUTO DIFFERENTIAL - Abnormal; Notable for the following components:       Result Value    WBC 31.7 (*)     RBC 3.73 (*)     Hemoglobin 11.3 (*)     Hematocrit 36.2 (*)     MCHC 31.2 (*)     RDW 17.3 (*)     Segs Relative 92.3 (*)     Lymphocytes % 2.6 (*)     Immature Neutrophil % 1.1 (*)     All other components within normal limits   COMPREHENSIVE METABOLIC PANEL - Abnormal; Notable for the following components:    Chloride 98 (*)     CO2 9 (*)     BUN 4 (*)     Glucose 146 (*)     Albumin 2.9 (*)     Total Protein 5.9 (*)     AST 71 (*)     Anion Gap 31 (*)     All other components within normal limits   TROPONIN - Abnormal; Notable for the following components:    Troponin T 0.019 (*)     All other components within normal limits   BRAIN NATRIURETIC PEPTIDE - Abnormal; Notable for the following components:    Pro-BNP 3,035 (*)     All other components within normal limits   URINALYSIS - Abnormal; Notable for the following components:    Clarity, UA SLIGHTLY CLOUDY (*)     Bilirubin Urine SMALL NUMBER OR AMOUNT OBSERVED (*)     Ketones, Urine >80 (*)     Blood, Urine MODERATE NUMBER OR AMOUNT OBSERVED (*)     Protein, UA TRACE (*)     Leukocyte Esterase, Urine SMALL NUMBER OR AMOUNT OBSERVED (*)     All other components within normal limits   LACTIC ACID - Abnormal; Notable for the following components:    Lactate 2.5 (*)     All other components within normal limits   CULTURE, BLOOD 1   CULTURE, BLOOD 2   CULTURE, URINE   CULTURE, MRSA, SCREENING   LIPASE   MAGNESIUM   PROTIME/INR & PTT   BLOOD GAS, VENOUS   MICROSCOPIC URINALYSIS   TYPE AND SCREEN       When ordered, only abnormal lab results are displayed. All other labs were within normal range or not returned as of this dictation. EKG: When ordered, EKG's are interpreted by the Emergency Department Physician in the absence of a cardiologist.  Please see their note for interpretation of EKG. RADIOLOGY:   Non-plain film images such as CT, Ultrasound and MRI are read by the radiologist. Plain radiographic images are visualized and preliminarily interpreted by the  ED Provider with the below findings:    Interpretation perthe Radiologist below, if available at the time of this note:    CT ABDOMEN PELVIS WO CONTRAST Additional Contrast? None   Final Result   Large partially calcified mass noted in the region of the GE junction. It   measures 10 x 6.6 x 9 cm. This may represent a leiomyosarcoma or GIST. Moderate bilateral pleural effusion with compressive atelectasis in lung   bases. Dense barium impaction of the rectum. Patient is status post cholecystectomy and hysterectomy. Calcified   granulomas noted in the spleen. Catalan catheter in the bladder. XR CHEST PORTABLE   Final Result   1. Cardiomegaly without overt failure. 2. Small bilateral pleural effusions with lower lobe airspace disease. I   would recommend follow-up to resolution. XR CHEST PORTABLE    Result Date: 1/10/2023  EXAMINATION: ONE XRAY VIEW OF THE CHEST 1/10/2023 11:50 am COMPARISON: None. HISTORY: ORDERING SYSTEM PROVIDED HISTORY: Chest pain TECHNOLOGIST PROVIDED HISTORY: Reason for exam:->Chest pain Reason for Exam: chest pain FINDINGS: There are postsurgical changes of median sternotomy. The heart size is enlarged. The pulmonary vasculature is within normal limits. There is increased density involving the lower lung zones bilaterally with blunting of the costophrenic angles. There is a left subclavian AICD. 1. Cardiomegaly without overt failure.  2. Small bilateral pleural effusions with lower lobe airspace disease. I would recommend follow-up to resolution. PROCEDURES   Unless otherwise noted below, none       CRITICAL CARE   CRITICAL CARE NOTE:  CRITICAL CARE NOTE:  There was a high probability of clinically significant life-threatening deterioration of the patient's condition requiring my urgent intervention due to atrial fibrillation with rapid ventricular rate, upper GI bleed, sepsis. IV fluids, IV Protonix, IV Cardizem, IV antibiotics, frequent reeval was performed to address this. Total critical care time is  45 minutes. This includes vital sign monitoring, pulse oximetry monitoring, telemetry monitoring, clinical response to the IV medications, reviewing the nursing notes, consultation time, dictation/documentation time, and interpretation of the lab work. This time excludes time spent performing procedures and separately billable procedures and family discussion time.   N/A    CONSULTS:  IP CONSULT TO IV TEAM  IP CONSULT TO GI  IP CONSULT TO CARDIOLOGY  IP CONSULT TO PHARMACY  PHARMACY TO DOSE VANCOMYCIN      EMERGENCY DEPARTMENT COURSE and MDM:   Vitals:    Vitals:    01/10/23 1428 01/10/23 1432 01/10/23 1602 01/10/23 1606   BP: (!) 124/101 (!) 135/102 (!) 124/96 (!) 124/96   Pulse: (!) 136 (!) 132 (!) 126 (!) 126   Resp: 28 (!) 32 29 29   Temp:   98 °F (36.7 °C) 98 °F (36.7 °C)   TempSrc:   Axillary Axillary   SpO2: 94% 92% 92% 92%   Weight:       Height:           Patient was given thefollowing medications:  Medications   ondansetron (ZOFRAN) injection 4 mg (4 mg IntraVENous Given 1/10/23 1153)   0.9 % sodium chloride infusion ( IntraVENous New Bag 1/10/23 1328)   dilTIAZem 100 mg in dextrose 5 % 100 mL infusion (ADD-Dougherty) (15 mg/hr IntraVENous Rate/Dose Change 1/10/23 1646)   pantoprazole (PROTONIX) injection 40 mg (has no administration in time range)   morphine (PF) injection 2 mg (has no administration in time range)   vancomycin (VANCOCIN) 750 mg in dextrose 5 % 250 mL IVPB [facility]) (has no administration in time range)   pantoprazole (PROTONIX) injection 80 mg (80 mg IntraVENous Given 1/10/23 1154)   0.9 % sodium chloride bolus (0 mLs IntraVENous Stopped 1/10/23 1328)   morphine sulfate (PF) injection 4 mg (4 mg IntraVENous Given 1/10/23 1217)   vancomycin (VANCOCIN) 1,250 mg in dextrose 5 % 250 mL IVPB ([id number]) (0 mg IntraVENous Stopped 1/10/23 1606)   cefepime (MAXIPIME) 2000 mg IVPB minibag (0 mg IntraVENous Stopped 1/10/23 1403)   dilTIAZem injection 10 mg (10 mg IntraVENous Given 1/10/23 1321)         Is this patient to be included in the SEP-1 Core Measure due to severe sepsis or septic shock? No   Exclusion criteria - the patient is NOT to be included for SEP-1 Core Measure due to:  May have criteria for sepsis, but does not meet criteria for severe sepsis or septic shock    MDM:  Patient presents as above. Emergent etiologies considered. Patient seen and examined. Work-up initiated secondary to presentation, physical exam findings, vital signs and medical chart review. In brief, 69-year-old female presenting the emergency department today from long-term care facility with dark-colored vomit, chest pain, abdominal pain. Patient has been having hematemesis. Is not anticoagulated which was verified through pharmacy. Has history of GI bleeding. Having abdominal pain, no peritoneal signs but has demonstrating lower abdominal issues. Patient is in atrial fibrillation with RVR. Has history of such. Initially given IV fluids Protonix, pain medication. Will send basic labs, EKG, type and screen    Patient has a likely upper GI bleed on A. fib with RVR, given IV Protonix, IV fluids, IV Cardizem which should help correct her heart rate. Blood pressure maintaining. No signs of significant hemorrhage or anemia on hemoglobin evaluation. Patient does have a significant elevated white blood cell count lactic acid.   Empiric antibiotics and fluid resuscitation were initiated per    Patient demonstrates no sign of significant anemia. Did show signs of leukocytosis. Also had an anion gap and possibly developing metabolic acidosis. CT scan also demonstrating a large calcified mass within the GE junction. There was dense barium impaction rectum. On further chart review patient recently seen and evaluated at [facility] for similar had an EEG done several days ago which did not show any signs of Pap possible mass or upper GI bleeding, unclear of the exact etiology of this. We will attempt to transfer back to [facility] for continuity of care but they are not accepting any transfers at this moment. Will admit via medicine, will consult GI and cardiology for further management. Patient otherwise remained stable. Patient was seen and evaluated by attending physician see their note for further detail. CLINICAL IMPRESSION      1. Upper GI bleed    2. Atrial fibrillation with rapid ventricular response ([address]    3. SIRS (systemic inflammatory response syndrome) (HCC)    4. Pleural effusion, bilateral    5. Hypoxia    6. Gastric mass    7. Metabolic acidosis          DISPOSITION/PLAN   DISPOSITION Admitted 01/10/2023 04:10:43 PM    (Please note that portions ofthis note were completed with a voice recognition program.  Efforts were made to edit the dictations but occasionally words are mis-transcribed. )    RADHA Yoon (electronically signed)            RADHA Dewey  01/10/23 4020

## 2023-01-10 NOTE — CONSULTS
[address] [facility]  consulted by NAVA Saeed for monitoring and adjustment. Indication for treatment: Vancomycin indication: Other: Sepsis of unknown etiology  Goal trough: Trough Goal: 15-20 mcg/mL  AUC/FLAQUITO: 400-600    Risk Factors for MRSA Identified:   Hospitalization within the past 90 days    Pertinent Laboratory Values:   Temp Readings from Last 3 Encounters:   01/10/23 98 °F (36.7 °C) (Axillary)     Recent Labs     01/10/23  1144   WBC 31.7*   LACTATE 2.5*     Recent Labs     01/10/23  1144   BUN 4*   CREATININE 0.6     Estimated Creatinine Clearance: 66 mL/min (based on SCr of 0.6 mg/dL). No intake or output data in the 24 hours ending 01/10/23 1649    Pertinent Cultures:   Date    Source    Results  1/10   Blood    Ordered  1/10   Urine                   Ordered  1/10   MRSA Nasal   Ordered    Vancomycin level:   TROUGH:  No results for input(s): VANCOTROUGH in the last 72 hours. RANDOM:  No results for input(s): VANCORANDOM in the last 72 hours. Assessment:  HPI: Colleen Barber is a 67 y.o. female who presents to the emergency department today via EMS from [facility] with melanotic, dark-colored vomit, abdominal pain, chest pain. Patient is alert oriented sitting up, answering questions. Pharmacy now consulted for sepsis of unknown etiology. SCr, BUN, and urine output: SCR appears close to baseline, no UOP  Day(s) of therapy: 1 of 7  Vancomycin concentration:   1/12 - random level @06:00    Plan:  The patient received vancomycin 1250mg ivpb x1 dose earlier today, start vancomycin 750mg ivpb q12h early tomorrow am for a predicted AUC of 531 at steady state. Check the vanco level in 2 days  Pharmacy will continue to monitor patient and adjust therapy as indicated    Vivek 3 1/12 @06:00    Thank you for the consult. Mayra Wetzel, [facility]  1/10/2023 4:49 PM

## 2023-01-10 NOTE — PROGRESS NOTES
[address] [facility]  consulted by Dr. Nico Pierre for monitoring and adjustment. Indication for treatment: Vancomycin indication: Sepsis unknown source likely secondary to pneumonia  Goal trough: Trough Goal: 10-15 mcg/mL  AUC/FLAQUITO: 400-600    Risk Factors for MRSA Identified:       Pertinent Laboratory Values:   Temp Readings from Last 3 Encounters:   01/10/23 98 °F (36.7 °C) (Axillary)     Recent Labs     01/10/23  1144   WBC 31.7*   LACTATE 2.5*     Recent Labs     01/10/23  1144   BUN 4*   CREATININE 0.6     Estimated Creatinine Clearance: 66 mL/min (based on SCr of 0.6 mg/dL). No intake or output data in the 24 hours ending 01/10/23 1815    Pertinent Cultures:   Date    Source    Results  1/10   Blood    pending  N/A   Sputum/Respiratory  N/A  1/10   MRSA Nasal   pending  N/A   Wound    N/A    Vancomycin level:   TROUGH:  No results for input(s): VANCOTROUGH in the last 72 hours. RANDOM:  No results for input(s): VANCORANDOM in the last 72 hours. Assessment:  HPI: Patient presented with a GI bleed, as well as pneumonia  SCr, BUN, and urine output: SCr = 0.6, BUN = 4  Day(s) of therapy: ordered for 7 days  Vancomycin concentration:     Plan: Will start vancomycin 1000mg IVPB daily.    Pharmacy will continue to monitor patient and adjust therapy as indicated    VANCOMYCIN CONCENTRATION SCHEDULED FOR 1/11 @0600    Thank you for the consult.  Radha Galvan, [facility]  1/10/2023 6:15 PM

## 2023-01-10 NOTE — ACP (ADVANCE CARE PLANNING)
Patient is oriented but her comprehension of her medical condition is doubtful. On discussion about her current illness and prognosis, she voice understanding. On discussion about code status - she wishes to remain FULL CODE.         Electronically signed by Namita Higgins MD on 1/10/23 at 5:16 PM EST

## 2023-01-10 NOTE — ED NOTES
[address] called [facility] for transfer pt. Spoke with Ted Van at intake. [facility] Is not excepting any transfers at thsi time. They are full to capacity at all their facilities.       Spohia Pathak  01/10/23 1548

## 2023-01-11 NOTE — CONSULTS
[address] [place] [address], [address] [facility]                                  CONSULTATION    PATIENT NAME: Benson Mendez                     :        1950  MED REC NO:   [id number]                          ROOM:       [id number]  ACCOUNT NO:   [de-identified]                           ADMIT DATE: 01/10/2023  PROVIDER:     Percy Littlejohn MD    CONSULT DATE:  01/10/2023    CHIEF COMPLAINT:  History of hematemesis with abnormal CAT scan, rule  out upper GI bleeding. HISTORY OF PRESENT ILLNESS:  As follows: The patient is a 77-year-old  white female resident of [address] [place] [address]  with past medical history significant for thyroid disease,  gastroesophageal reflux disease, coronary artery disease, valvular heart  disease, also history of hematemesis and dysphagia being followed up by  Dr. Romana Baumann at [facility], where she recently had an upper  GI with small-bowel follow-through done and also EGD on 2023. The  patient presented to the emergency room with melenic stools, nausea,  vomiting, hematemesis, and abdominal and chest pain. In the emergency  room, the patient was evaluated and had a Chem profile drawn, which was  unremarkable. LFTs were within normal limits; however, the CBC showed a  WBC count of 31.7, hemoglobin 11.3, platelet count of 499,216. The  patient had a CAT scan done of the abdomen and pelvis, which showed a 10  x 6.6 x 9 cm mass lesion at the GE junction and it may represent  leiomyosarcoma versus gist tumor. Plans are being made to transfer the  patient back to [facility] for further workup and management. The  patient is on Protonix and she is hemodynamically stable. The patient  has had multiple EGDs and colonoscopies done in the past and the last  EGD was on 2023.     PAST MEDICAL HISTORY:  Significant for history of coronary artery  disease, thyroid disease, history of gastroesophageal reflux  disease/hematemesis, and dysphagia. 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 FAMILY HISTORY:  Noncontributory. SOCIOECONOMIC HISTORY:  No history of EtOH abuse. The patient does not  smoke cigarettes. CURRENT MEDICATIONS:  Please refer to chart. ALLERGIES:  The patient is allergic to BUPRENORPHINE. REVIEW OF SYSTEMS:  CENTRAL NERVOUS SYSTEM EXAM:  The patient denies headache or focal  sensorimotor symptoms. CARDIOVASCULAR SYSTEM:  History of chest pain, but the patient complains  of shortness of breath, but no leg swelling. GENITOURINARY SYSTEM:  No history of dysuria, pyuria, hematuria. MUSCULOSKELETAL SYSTEM:  The patient complains of generalized weakness. RESPIRATORY SYSTEM:  No history of cough, hemoptysis, fever, or chills. PHYSICAL EXAMINATION:  GENERAL:  Shows 26-year-old white female of thin build and poor  nutritional status, who is lying flat in bed, in no acute distress. She  is awake, alert, oriented, and pleasant to talk with. VITAL SIGNS:  Stable. HEENT:  Exam shows skull to be atraumatic. NECK:  Supple. CHEST:  Clear with diminished breath sounds. CARDIOVASCULAR:  S1 and S2 is normal  ABDOMEN:  Soft, nontender, and nondistended. Liver and spleen are not  palpable. Bowel sounds are present. RECTAL EXAM:  Deferred. CNS EXAM:  Shows the patient to be awake, alert, and oriented. There  are no focal or sensorimotor signs. MUSCULOSKELETAL SYSTEM EXAM:  Shows evidence of degenerative joint  disease changes. LABORATORY DATA:  As above mentioned.     IMPRESSION:  1.  A 68-year white female with multiple comorbidities, a resident of  [place] [address], presents with nausea, vomiting, hematemesis, and  blackish stools with abnormal CAT scan, rule out upper GI bleeding and  etiology to be determined. 2.  Rule out large mass at the GE junction. RECOMMENDATIONS:  1. Agree with present management with IV fluids. 2.  We will continue the patient on Protonix. 3.  Monitor the patient's H and H and transfuse on a p.r.n. basis to  keep hemoglobin above 7 gm percent. 4.  I would like the patient to be transferred back to [facility] for  further workup and management in view of the large mass noted at the GE  junction. 5.  No need for repeat EGD since the patient had one on 01/06/2023.  6.  The case and plan has been discussed in detail with the patient and  was also met with physician assistant in the emergency room as well.         Moriah Belcher MD    D: 01/10/2023 17:13:55       T: 01/10/2023 17:16:34     [email or web address]  Job#: [id number]     Doc#: [id number]    CC:

## 2023-01-11 NOTE — PROGRESS NOTES
Verbal orders for Fentanyl 50 mcg IV one time dose Per Dr. Benson Mejia. Orders placed by this RN. See MAR.        Ryan Maya RN

## 2023-01-11 NOTE — PROCEDURES
Chest Tube Insertion Procedure Note - Pigtail  Procedure Clinician: Nisha Christianson MD   Procedure Assistant: None   Procedure: Insertion of chest tube   Indications: Right pneumothorax   Size and location: 12Fr right chest   Attempts: 1     Procedure Details:   emergent  The patient was prepped and draped in a sterile manner using chlorhexidine scrub after the patient was positioned in the usual fashion. A total of 10 mL of 1% lidocaine was used to anesthesize the skin, subcutaneous tissue, superior aspect of the rib periosteum and parietal pleura. Ultrasound was then used to visualize the pleural effusion. The pleura was then entered with a 20G needle, with successful aspiration of air A guidewire was then placed thru the needle, and the incision was dilated. A 12 Mohawk pigtail was then advanced over the guidewire cephalad and posteriorly and inserted easily without resistance. The guidewire was then removed. The pigtail was sutured to the skin at the insertion site, and connected securely with tape to a pleurovac. A sterile occlusive dressing was placed over the insertion site. No immediate complications were noted     Findings: There were no changes to vital signs. Patient tolerated the procedure well. Recommendations:   CXR ordered to verify placement.

## 2023-01-11 NOTE — PROGRESS NOTES
Critical care progress note:  Patient seen and examined this AM.  Labs and vitals reviewed. Levophed infusion has been weaned off. Blood pressures are still soft, however stable. She is currently with NG tube in place and right chest tube. She does report pain at site of chest tubes in addition to pain in abdomen. Toradol and morphine as needed ordered for pain. Daughter-in-law is at bedside, I did update her. I have transitioned her from nonrebreather to Vapotherm, currently on 80% FiO2. Plan to continue course of treatment already established. Antibiotics to be discontinued at this time, cultures pending. Notes from GI reviewed, and reveals he was requesting transfer to [facility] for work-up of mass at [facility]. Will call and place on bedboard. We will continue to follow closely in the ICU.     Valery PENA-ACNP  Critical Care Nurse Practitioner   JULI

## 2023-01-11 NOTE — PROGRESS NOTES
[facility]  consulted by NAVA Williamson for monitoring and adjustment. Indication for treatment: Vancomycin indication: Other: Sepsis of unknown etiology  Goal trough: Trough Goal: 15-20 mcg/mL  AUC/FLAQUITO: 400-600    Risk Factors for MRSA Identified:   Hospitalization within the past 90 days    Pertinent Laboratory Values:   Temp Readings from Last 3 Encounters:   01/11/23 97.8 °F (36.6 °C) (Axillary)     Recent Labs     01/10/23  1144 01/11/23  0350 01/11/23  0400   WBC 31.7* 38.1*  --    LACTATE 2.5*  --  1.4       Recent Labs     01/10/23  1144 01/10/23  2025 01/11/23  0350   BUN 4* 4* 5*   CREATININE 0.6 0.5* 0.5*       Estimated Creatinine Clearance: 79 mL/min (A) (based on SCr of 0.5 mg/dL (L)). Intake/Output Summary (Last 24 hours) at 1/11/2023 0949  Last data filed at 1/11/2023 0104  Gross per 24 hour   Intake 379.86 ml   Output 650 ml   Net -270.14 ml       Pertinent Cultures:   Date    Source    Results  1/10   Blood    Ordered  1/10   Pleural fluid   Ordered  1/10   Urine                   Ordered  1/10   MRSA Nasal   Ordered    Vancomycin level:   TROUGH:  No results for input(s): VANCOTROUGH in the last 72 hours. RANDOM:    Recent Labs     01/11/23  0350   VANCORANDOM 8.8       Assessment:  HPI: Whit Sterling is a 67 y.o. female who presents to the emergency department today via EMS from [facility] with melanotic, dark-colored vomit, abdominal pain, chest pain. Patient is alert oriented sitting up, answering questions. Pharmacy now consulted for sepsis of unknown etiology.   SCr, BUN, and urine output: SCR appears close to baseline, I/O incomplete- Lactic acidosis resolved  Day(s) of therapy: 2 of 7  Vancomycin concentration:   1/11 @ 0350 = 8.8 ~ 12 hours post dose  1/12 - random level @06:00    Plan:  The patient received vancomycin 1250mg ivpb x1 dose earlier today, start vancomycin 750mg ivpb q12h early tomorrow am for a predicted AUC of 531 at steady state. Pharmacy will continue to monitor patient and adjust therapy as indicated    [address] 1/12 @06:00    Thank you for the consult.   Yair Gordon [facility]  1/11/2023 9:49 AM

## 2023-01-11 NOTE — PROGRESS NOTES
Spoke with daughter in law at bedside, pt with PCR + for covid, however she stated she was positive prior to Alexandr. Called and spoke with infection control Angela, Pt was positive 12/16/22 and is out of the isolation period. Boston Cogan, NP notified.

## 2023-01-11 NOTE — PROGRESS NOTES
Aviva Araya RN supervisor accompanied by Maidha Lau RN ICU nurse at bedside  and assumed care of patient. Dr Kaley Domingo, Dr Richard Guevara and Dr Pauly Lopez at bedside.

## 2023-01-11 NOTE — PROGRESS NOTES
[place], [place] notified, pt with 175ml of urine out for this shift. Pt also with increased tachycrdia, HR up to the 130s. New order for IV fluids.

## 2023-01-11 NOTE — PROGRESS NOTES
Wave form on arterial line dampened, not reading accurately. Attempted to reposition without success. Arterial line removed at this time. Pressure held, dressing applied.

## 2023-01-11 NOTE — PROGRESS NOTES
DOING FAIR  NO ACTIVE BLEEDING BUT C/O NAUSEA AND OCC EMESIS HAD THORACENTESIS LAST NIGHT WITH RESULTANT PNEUMOTHORAX AND CT PLACEMENT WAS ON LEVO BUT D/C  VITALS STABLE   LABS NOTED HB STABLE AT 8.8  PT HAD MULTIPLE EGDS IN [place] IN 07/2022--11/2022 AND LAST ONE BY DR CALLES 01/06/2023 NO MENTION OF MASS AT [facility] ?  WILL CPM IF PT NOT BEING TRANSFERRED TO [facility] THEN WILL NEED BARIATRIC SURGICAL CONSULT FOR FURTHER IMAGING / W/U FOR MASS AT [facility] PT HAD RYGB BEFORE NO NEED FOR REPEAT EGD AT PRESENT  WILL CPM F/U H/H D/W INTENCIVIST DR BARBOUR AND RN ZAHRA

## 2023-01-11 NOTE — PROGRESS NOTES
CARDIOLOGY  NOTE        Name:  Maximo Steven /Age/Sex: 1950  (67 y.o. female)   MRN & CSN:  [id number] & [id number] Admission Date/Time: 1/10/2023 11:34 AM   Location:  [facility] PCP: Susan Rios Day: 2        PLAN FROM CARDIOLOGY FOR TODAY:   Supportive care events from early a.m. noted  Anticoagulate if  there is no contraindication      - cardiology consult is for: Atrial fibrillation    -  Interval history: Hypotension transferred to ICU    ASSESSMENT/ PLAN:      -Patient is in atrial fibrillation with rapid ventricular response etiology unclear we will continue with negative chronotropic agent and anticoagulation  -Patient has leukocytosis possibly of sepsis hence will obtain an echocardiogram for further assessment and further work-up  -BNP level is elevated suggestive of volume overload  Continue to monitor the hemodynamics we will check the echocardiogram          Subjective: Todays complain: Patient lethargic    HPI:  Lea Hinds is a 67 y. o.year old who and presents with had concerns including Hematemesis (2 x coffee ground) and Chest Pain. Chief Complaint   Patient presents with    Hematemesis     2 x coffee ground    Chest Pain           Objective: Temperature:  Current - Temp: 97.8 °F (36.6 °C);  Max - Temp  Av.1 °F (36.7 °C)  Min: 97.6 °F (36.4 °C)  Max: 98.5 °F (36.9 °C)    Respiratory Rate : Resp  Av.1  Min: 19  Max: 40    Pulse Range: Pulse  Av.4  Min: 101  Max: 151    Blood Presuure Range:  Systolic (45ESG), XNY:173 , Min:57 , FBU:348   ; Diastolic (68WRV), KEY:24, Min:28, Max:111      Pulse ox Range: SpO2  Av.3 %  Min: 79 %  Max: 96 %    24hr I & O:    Intake/Output Summary (Last 24 hours) at 2023 0955  Last data filed at 2023 0104  Gross per 24 hour   Intake 379.86 ml   Output 650 ml   Net -270.14 ml         /74   Pulse (!) 138   Temp 97.8 °F (36.6 °C) (Axillary)   Resp 28   Ht 5' 4\" (1.626 m)   Wt 110 lb (49.9 kg)   SpO2 93%   BMI 18.88 kg/m²           Review of Systems:    Lethargic    TELEMETRY: Atrial fibrillation   has a past medical history of CAD (coronary artery disease), Cataract, Dysphagia, Epiglottiditis, GERD (gastroesophageal reflux disease), GERD (gastroesophageal reflux disease), Hematemesis, History of GI bleed, HLD (hyperlipidemia), Iron deficiency, Mitral regurgitation, Non-rheumatic mitral valve disease, OA (osteoarthritis), PAF (paroxysmal atrial fibrillation) ([facility] [address] Thyroid disease, and VRE (vancomycin resistant enterococcus) culture positive. has a past surgical history that includes Esophagogastroduodenoscopy (01/06/2023); Coronary artery bypass graft; Appendectomy; Breast biopsy (Left, 04/04/2017); Cardiac catheterization; Cardiac valve replacement (2012); Mitral valve repair; Cataract removal (Right, 08/15/2018); Cholecystectomy; Colonoscopy (2013); Coronary artery bypass graft (06/06/2012); Gastric bypass surgery; Hysterectomy; joint replacement (Bilateral); Knee arthroscopy (Right, 2014); Esophagogastroduodenoscopy (07/12/2022); Tonsillectomy; Tubal ligation; and Upper gastrointestinal endoscopy. Physical Exam:  General:  Awake,   Head:normal  Eye: Pupils equal and round  Neck:  No JVD, no carotid bruit noted   Chest:  Clear to auscultation, no signs of respiratory distress  Cardiovascular:  Normal rate and rhythm. S1 and S2 noted.  No murmurs rubs or gallops  Abdomen:   nontender  Extremities:  tr edema  Pulses; palpable  Neuro: grossly normal    Medications:    pantoprazole  40 mg IntraVENous BID    citric acid-sodium citrate  30 mL Oral BID    [Held by provider] amiodarone  400 mg Oral Nightly    atorvastatin  20 mg Oral Nightly    [Held by provider] ferrous sulfate  325 mg Oral Daily with breakfast    gabapentin  200 mg Oral Nightly    levothyroxine  75 mcg Oral Daily    cefepime  2,000 mg IntraVENous Q8H    vancomycin  750 mg IntraVENous Q12H potassium chloride **OR** potassium chloride, sodium phosphate IVPB **OR** sodium phosphate IVPB **OR** sodium phosphate IVPB, ondansetron, albuterol sulfate HFA, acetaminophen **OR** acetaminophen, morphine    Lab Data:  CBC:   Recent Labs     01/10/23  1144 01/10/23  2025 01/11/23  0350 01/11/23  0823   WBC 31.7*  --  38.1*  --    HGB 11.3* 10.6* 9.4* 9.0*   HCT 36.2* 34.5* 30.6* 28.7*   MCV 97.1  --  97.5  --      --  352  --      BMP:   Recent Labs     01/10/23  1144 01/10/23  2025 01/11/23  0350    139 140   K 4.2 3.3* 3.2*   CL 98* 101 108   CO2 9* 11* 11*   BUN 4* 4* 5*   CREATININE 0.6 0.5* 0.5*     LIVER PROFILE:   Recent Labs     01/10/23  1144 01/11/23  0350   AST 71* 44*   ALT 38 37   LIPASE 26 46   BILITOT 0.5 0.4   ALKPHOS 84 73     PT/INR:   Recent Labs     01/10/23  1144 01/11/23  0600   PROTIME 12.0 13.1   INR 0.93 1.02     APTT:   Recent Labs     01/10/23  1144 01/11/23  0600   APTT 29.9 32.6     BNP:  No results for input(s): BNP in the last 72 hours. TROPONIN: No results for input(s): TROPONINI in the last 72 hours. Recent Labs     01/10/23  1144 01/10/23  2025 01/11/23  0350   TROPONINT 0.019* 0.013* <0.010        Labs, consult, tests reviewed                    Madyson Cason MD, PA-C 1/11/2023 9:55 AM     Please note this report has been partially produced using speech recognition software and may contain errors related to that system including errors in grammar, punctuation, and spelling, as well as words and phrases that may be inappropriate. If there are any questions or concerns please feel free to contact the dictating provider for clarification.

## 2023-01-11 NOTE — PROGRESS NOTES
Pt NGT came out despite being secured x2. Pt with episode of vomit even with NGT in place. Ok to leave out per Danie Newman CNP and new ordered for antiemetics.

## 2023-01-11 NOTE — PROGRESS NOTES
Patient arrived to ICU from 3N. Patient connected to icu monitor. Patient arrived with LEVO infusing at 5 mcg. NS @ 999ml. XRAY at bedside. Dr. Luigi Navas placing chest tube.        Tulio Holloway RN

## 2023-01-11 NOTE — PROCEDURES
Arterial Catheter Insertion Procedure Note   Procedure: Insertion of Arterial Line   Procedure Clinician: Bard Jaylen MD   Procedure Assistant: None   Indications: BP monitoring   Size and location: 20G, right radial artery   Attempts: 1   Procedure Details:   emergent  Under sterile conditions the skin above the right radial artery was prepped with chlorhexidine. Local anesthesia was applied to the skin and subcutaneous tissues. An Arrow kit was used to insert a 20-gauge needle into the artery. A guide wire was then passed easily through the needle without resistance. A 20 gauge catheter was then inserted into the vessel over the guide wire. The guidewire was then removed with the tip intact. The catheter was then secured into place. Findings: There were no changes to vital signs. Catheter was flushed with 20 mL NS. Patient tolerated the procedure well.

## 2023-01-11 NOTE — PROCEDURES
Thoracentesis    Date/Time: 1/11/2023 1:54 AM  Performed by: Bryan De Leon MD  Authorized by: Bryan De Leon MD   Consent: Verbal consent obtained. Written consent obtained. Risks and benefits: risks, benefits and alternatives were discussed  Consent given by: patient  Patient identity confirmed: verbally with patient and arm band  Procedure purpose: diagnostic and therapeutic  Indications: pleural effusion  Preparation: Patient was prepped and draped in the usual sterile fashion.   Local anesthesia used: yes    Anesthesia:  Local anesthesia used: yes  Local Anesthetic: lidocaine 1% without epinephrine  Preparation: skin prepped with ChloraPrep  Patient position: sitting  Ultrasound guidance: yes  Location: left posterior  Number of attempts: 1  Drainage amount: 450 ml  Drainage characteristics: serous

## 2023-01-11 NOTE — PROGRESS NOTES
Dr. Betsy Acosta, Dr. Karla Bruno and Dr. Christy Gómez at bedside for thoracentesis. Procedure explained to patient and all questions answered, consent signed and placed in soft chart. Patient sat at the side of the bed. Patient on nonrebreather. 1 mg versed given per Dr. Betsy Acosta. 450 ml of fluid removed from Left lung and 200 ml from Right Lung. Patient BP dropped during procedure 62/39, SELENE given IV push per Dr. Betsy Acosta. Orders placed for albumin 250 ml per Dr. Betsy Acosta. NS gtt increased to 999ml/hr. Orders for Levo gtt per Dr. Betsy Acosta. Patient to transfer to ICU.        Katelyn Matamoros RN

## 2023-01-11 NOTE — PROGRESS NOTES
RN  Supervisor Mode Kendall notified of change in patient condition and supplies needed by Dr Mortimer Azure for bedside thoracentesis.

## 2023-01-11 NOTE — PROGRESS NOTES
Pt with episode of nausea/vomiting immediately following Med administration through NGT. Cindy Rodriguez NP notified. Order to keep pt strict NPO.

## 2023-01-11 NOTE — PROGRESS NOTES
Labs drawn and sent to lab. Mrsa swab nares obtained and sent to lab. Hands are cold and patient has nail polish on warm blanket to hands to warm. Nail polish to be removed from finger.

## 2023-01-11 NOTE — PROGRESS NOTES
1800 received to room slide over to bed. No distress noted or c/o. Placed on monitor. And blood pressure cuff. Oriented to room. Bed in lowest position wheels locked. Call light in reach . 1900 son here up dated him on pt current condition.

## 2023-01-11 NOTE — PROCEDURES
Yoselyn Courtney is a 67 y.o. female patient. 1. Upper GI bleed    2. Atrial fibrillation with rapid ventricular response ([facility] [address]    3. SIRS (systemic inflammatory response syndrome) (HCC)    4. Pleural effusion, bilateral    5. Hypoxia    6. Gastric mass    7. Metabolic acidosis    8. Pleural effusion      Past Medical History:   Diagnosis Date    CAD (coronary artery disease)     s/p cabg    Cataract     Dysphagia     Epiglottiditis     GERD (gastroesophageal reflux disease)     GERD (gastroesophageal reflux disease)     Hematemesis     History of GI bleed     HLD (hyperlipidemia)     Iron deficiency     Mitral regurgitation     torn Chordae    Non-rheumatic mitral valve disease     OA (osteoarthritis)     PAF (paroxysmal atrial fibrillation) (HCC)     Thyroid disease     VRE (vancomycin resistant enterococcus) culture positive 11/29/2022     Blood pressure (!) 57/45, pulse (!) 126, temperature 98.5 °F (36.9 °C), temperature source Axillary, resp. rate 28, height 5' 4\" (1.626 m), weight 110 lb (49.9 kg), SpO2 93 %. Thoracentesis    Date/Time: 1/11/2023 3:12 AM  Performed by: Luiz Kirby MD  Authorized by: Luiz Kirby MD   Consent: Verbal consent obtained.   Risks and benefits: risks, benefits and alternatives were discussed  Consent given by: patient  Patient understanding: patient states understanding of the procedure being performed  Patient consent: the patient's understanding of the procedure matches consent given  Procedure consent: procedure consent matches procedure scheduled  Relevant documents: relevant documents present and verified  Test results: test results available and properly labeled  Site marked: the operative site was marked  Imaging studies: imaging studies available  Required items: required blood products, implants, devices, and special equipment available  Patient identity confirmed: arm band and verbally with patient  Time out: Immediately prior to procedure a \"time out\" was called to verify the correct patient, procedure, equipment, support staff and site/side marked as required. Procedure purpose: therapeutic and diagnostic  Indications: pleural effusion  Preparation: Patient was prepped and draped in the usual sterile fashion. Local anesthesia used: yes  Anesthesia: local infiltration    Anesthesia:  Local anesthesia used: yes  Local Anesthetic: lidocaine 1% without epinephrine    Sedation:  Patient sedated: no    Preparation: skin prepped with alcohol and skin prepped with ChloraPrep  Patient position: sitting  Ultrasound guidance: yes  Location: right lateral  Intercostal space: 5th  Puncture method: over-the-needle catheter  Number of attempts: 1  Drainage amount: 200 ml  Drainage characteristics: bloody  Chest x-ray performed: yes  Chest x-ray interpreted by radiologist, me and other physician. Chest x-ray findings: pneumothorax  Comments: Intensivist at bedside during procedure and placed chest tube for the right side.        Cameron Saxena MD, MD  1/11/2023

## 2023-01-12 PROBLEM — K92.2 UPPER GI BLEED: Status: ACTIVE | Noted: 2023-01-01

## 2023-01-12 NOTE — DISCHARGE INSTR - COC
Continuity of Care Form    Patient Name: Ester Cornell   :    MRN:  [id number]    Admit date:  1/10/2023  Discharge date:  ***    Code Status Order: Full Code   Advance Directives:     Admitting Physician:  Génesis Rojas MD  PCP: Cecilia Cavazos    Discharging Nurse: [facility] Unit/Room#: 2129/2129-A  Discharging Unit Phone Number: ***    Emergency Contact:   Extended Emergency Contact Information  Primary Emergency Contact: kosta buitrago  Home Phone: [phone]  Relation: Spouse  Secondary Emergency Contact: Mee0 [place]  Mobile Phone: [phone]  Relation: Child   needed? No    Past Surgical History:  Past Surgical History:   Procedure Laterality Date    APPENDECTOMY      BREAST BIOPSY Left 2017    CABG WITH AORTIC VALVE REPAIR      mitral valve repair.  [place] to LAD    CARDIAC CATHETERIZATION      CARDIAC VALVE REPLACEMENT  2012    CATARACT REMOVAL Right 08/15/2018    CHOLECYSTECTOMY      COLONOSCOPY  2013    CORONARY ARTERY BYPASS GRAFT  06/06/2012    x1    ESOPHAGOGASTRODUODENOSCOPY  2023    ESOPHAGOGASTRODUODENOSCOPY  2022    GASTRIC BYPASS SURGERY      20 years ago    HYSTERECTOMY (CERVIX STATUS UNKNOWN)      JOINT REPLACEMENT Bilateral     TKR    KNEE ARTHROSCOPY Right     MITRAL VALVE REPAIR      TONSILLECTOMY      TUBAL LIGATION      UPPER GASTROINTESTINAL ENDOSCOPY         Immunization History:   Immunization History   Administered Date(s) Administered    COVID-19, MODERNA BLUE border, Primary or Immunocompromised, (age 12y+), IM, 100 mcg/0.5mL 2021, 2021, 10/26/2021, 2022    COVID-19, PFIZER Bivalent BOOSTER, DO NOT Dilute, (age 12y+), IM, 30 mcg/0.3 mL 10/06/2022       Active Problems:  Patient Active Problem List   Diagnosis Code    Sepsis, unspecified organism ([facility] A41.9       Isolation/Infection:   Isolation            Contact  Droplet Plus          Patient Infection Status       Infection Onset Added Last Indicated Last Indicated By Review Planned Expiration Resolved Resolved By    BUTCH 22 Duy Negron RN        Added from external infection. [address]    Resolved    COVID-19 23 Respiratory Panel, Molecular, with COVID-19 (Restricted: peds pts or suitable admitted adults)   23 Duy Negron RN    Tested positive on 22 - Confirmed in [address] [place] [address] Road. Patient past 10 day isolation window. COVID-19 (Rule Out) 23 Respiratory Panel, Molecular, with COVID-19 (Restricted: peds pts or suitable admitted adults) (Ordered)   23 Rule-Out Test Resulted            Nurse Assessment:  Last Vital Signs: /73   Pulse (!) 118   Temp 98.7 °F (37.1 °C) (Oral)   Resp 23   Ht 5' 4\" (1.626 m)   Wt 124 lb 12.5 oz (56.6 kg)   SpO2 98%   BMI 21.42 kg/m²     Last documented pain score (0-10 scale): Pain Level: 10  Last Weight:   Wt Readings from Last 1 Encounters:   23 124 lb 12.5 oz (56.6 kg)     Mental Status:  {IP PT MENTAL STATUS:}    IV Access:  { SKYLAR IV ACCESS:677120480}    Nursing Mobility/ADLs:  Walking   {CHP DME RVRY:695552701}  Transfer  {P DME JDHW:029994238}  Bathing  {P DME PIUV:952651388}  Dressing  {P DME EGAT:510897415}  Toileting  {P DME IMFI:873423955}  Feeding  {P DME HXAX:468046023}  Med Admin  {P DME YFLP:937236701}  Med Delivery   { SKYLAR MED Delivery:818764157}    Wound Care Documentation and Therapy:        Elimination:  Continence: Bowel: {YES / ZJ:03184}  Bladder: {YES / O}  Urinary Catheter: {Urinary Catheter:437974590}   Colostomy/Ileostomy/Ileal Conduit: {YES / UL:21906}       Date of Last BM: ***    Intake/Output Summary (Last 24 hours) at 2023 0930  Last data filed at 2023 0730  Gross per 24 hour   Intake --   Output 1624 ml   Net -1624 ml     I/O last 3 completed shifts:   In: 379.9 [I.V.:379.8; IV Piggyback:0.1]  Out:  [ZPKLB:2798; Emesis/NG output:125]    Safety Concerns:     [facility] Safety Concerns:945761248}    Impairments/Disabilities:      [facility] Impairments/Disabilities:138421309}      Patient's personal belongings (please select all that are sent with patient):  {CHP DME Belongings:652458024}    RN SIGNATURE:  {Esignature:925134691}    CASE MANAGEMENT/SOCIAL WORK SECTION    Inpatient Status Date: ***    Readmission Risk Assessment Score:  Readmission Risk              Risk of Unplanned Readmission:  11           Discharging to Facility/ Agency   Name:   Address:  Phone:  Fax:    Dialysis Facility (if applicable)   Name:  Address:  Dialysis Schedule:  Phone:  Fax:    / signature: {Esignature:746065180}    PHYSICIAN SECTION    Prognosis: {Prognosis:9074477808}    Condition at Discharge: [address] Patient Condition:544385789}    Rehab Potential (if transferring to Rehab): {Prognosis:2817071020}    Nutrition Therapy:  Current Nutrition Therapy:   [address] [facility] Diet List:814927482}    Routes of Feeding: {CHP DME Other Feedings:914767857}  Liquids: {Slp liquid thickness:63153}  Daily Fluid Restriction: {CHP DME Yes amt example:523805950}  Last Modified Barium Swallow with Video (Video Swallowing Test): {Done Not Done QCTV:932395639}    Treatments at the Time of Hospital Discharge:   Respiratory Treatments: ***  Oxygen Therapy:  {Therapy; copd oxygen:79272}  Ventilator:    { CC Vent WQTU:521927117}    Rehab Therapies: {THERAPEUTIC INTERVENTION:2411630660}  Weight Bearing Status/Restrictions: [facility] Weight Bearin}  Other Medical Equipment (for information only, NOT a DME order):  {EQUIPMENT:767137687}  Other Treatments: ***    Recommended Labs or Other Treatments After Discharge: ***    Physician Certification: I certify the above information and transfer of Soy [place]ente  is necessary for the continuing treatment of the diagnosis listed and that she requires {Admit to Appropriate Level of Care:84930} for {GREATER/LESS:244108532} 30 days. Update Admission H&P: {[facility]ME [facility]}    PHYSICIAN SIGNATURE:  {Esignature:106442011}

## 2023-01-12 NOTE — PROGRESS NOTES
[address] [facility] SPEECH/LANGUAGE PATHOLOGY    Silvana Share  1/12/2023  [phone]    Noted dysphagia evaluation ordered for Silvana Share. Spoke with RN, who reports pt is regurgitating all attempted PO intake at this time. Pt also awaiting surgery evaluation. Will reattempt tomorrow 1/13.     Santi Baumann, SLP  1/12/2023  11:43 AM

## 2023-01-12 NOTE — CONSULTS
Department of General Surgery   Surgical Service Dr. Tae Pacheco   Consult Note    Date of Consult: 1/12/23    Reason for Consult:  Hx or remote RYGB, ? 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 female who presented to the ED with complaints described as:      Location: melanotic coffee ground emesis, abdominal / chest pain  Quality: pt denies  Severity: pt denies  Duration: acute  Timing: acute  Context: pt came in from LTAC  Modifying factors: denies  Associated signs and symptoms: denies    Pt was recently seen at [facility] and had multiple GI studies done, as described here:      EGD 1/6/23 at [facility] by Dr. Cheli Sandoval:  Post-operative Diagnosis:   No esophageal stricture  No evidence of esophagitis or scarring  Normal RNY gastric pouch anatomy  No anastomotic stricture      The Z-line was regular and noted. The gastric mucosa was evaluated in the pouch. Examination of the jejnum was performed. The findings are as above     UGI at [place]:IMPRESSION:   1. No evidence of obstruction. 2. Postsurgical changes of gastric bypass. Apparent focal thickening involving the gastric pouch may relate to gastritis. 3. Hiatal hernia       Pt came to ED with concern for hematemesis from [facility]. In ED she was AF with RVR. Pt was seen by Cardiology. Pt was seen by GI. Pt underwent thoracentesis which was complicated by hypoxia requiring  chest tube for right pneumothorax and transfer to ICU. A c/s was placed to . Pt is answering some questions appropriately. However, is confused on others.        Past Medical History:    Past Medical History:   Diagnosis Date    CAD (coronary artery disease)     s/p cabg    Cataract     Dysphagia     Epiglottiditis     GERD (gastroesophageal reflux disease)     GERD (gastroesophageal reflux disease)     Hematemesis     History of GI bleed     HLD (hyperlipidemia)     Iron deficiency     Mitral regurgitation     torn Chordae    Non-rheumatic mitral valve disease     OA (osteoarthritis)     PAF (paroxysmal atrial fibrillation) (HCC)     Thyroid disease     VRE (vancomycin resistant enterococcus) culture positive 11/29/2022       Past Surgical History:    Past Surgical History:   Procedure Laterality Date    APPENDECTOMY      BREAST BIOPSY Left 04/04/2017    CABG WITH AORTIC VALVE REPAIR      mitral valve repair.  [place] to LAD    CARDIAC CATHETERIZATION      CARDIAC VALVE REPLACEMENT  2012    CATARACT REMOVAL Right 08/15/2018    CHOLECYSTECTOMY      COLONOSCOPY  2013    CORONARY ARTERY BYPASS GRAFT  06/06/2012    x1    ESOPHAGOGASTRODUODENOSCOPY  01/06/2023    ESOPHAGOGASTRODUODENOSCOPY  07/12/2022    GASTRIC BYPASS SURGERY      20 years ago    HYSTERECTOMY (CERVIX STATUS UNKNOWN)      JOINT REPLACEMENT Bilateral     TKR    KNEE ARTHROSCOPY Right 2014    MITRAL VALVE REPAIR      TONSILLECTOMY      TUBAL LIGATION      UPPER GASTROINTESTINAL ENDOSCOPY         Current Medications:   Current Facility-Administered Medications   Medication Dose Route Frequency Provider Last Rate Last Admin    metoprolol (LOPRESSOR) injection 5 mg  5 mg IntraVENous Q6H Kreg Sacks, MD   5 mg at 01/12/23 1221    piperacillin-tazobactam (ZOSYN) 3,375 mg in dextrose 5 % 50 mL IVPB extended infusion (mini-bag)  3,375 mg IntraVENous Q8H Anuradha S Francetta Phalen, PA-C   Stopped at 01/12/23 1310    pantoprazole (PROTONIX) injection 40 mg  40 mg IntraVENous BID Kreg Sacks, MD   40 mg at 01/12/23 0849    potassium chloride 20 mEq/50 mL IVPB (Central Line)  20 mEq IntraVENous PRN Kreg Sacks, MD        Or    potassium chloride 10 mEq/100 mL IVPB (Peripheral Line)  10 mEq IntraVENous PRN Kreg Sacks,  mL/hr at 01/12/23 1334 10 mEq at 01/12/23 1334    sodium phosphate 10 mmol in sodium chloride 0.9 % 250 mL IVPB  10 mmol IntraVENous PRN Sarwat Helms MD        Or    sodium phosphate 15 mmol in sodium chloride 0.9 % 250 mL IVPB  15 mmol IntraVENous PRN Sarwat Helms MD        Or    sodium phosphate 20 mmol in sodium chloride 0.9 % 500 mL IVPB  20 mmol IntraVENous PRN Sarwat Helms MD        ketorolac (TORADOL) injection 15 mg  15 mg IntraVENous Q6H PRN Perla Forward, MIA - CNP   15 mg at 01/12/23 1309    morphine (PF) injection 2 mg  2 mg IntraVENous Q4H PRN Perla Forward, APRN - CNP   2 mg at 01/12/23 0040    prochlorperazine (COMPAZINE) injection 10 mg  10 mg IntraVENous Q6H PRN Perla Forward, MIA - CNP   10 mg at 01/12/23 1309    albuterol sulfate HFA (PROVENTIL;VENTOLIN;PROAIR) 108 (90 Base) MCG/ACT inhaler 2 puff  2 puff Inhalation Q6H PRN MIA Kam CNP        [Held by provider] amiodarone (CORDARONE) tablet 400 mg  400 mg Oral Nightly MIA Kam CNP        atorvastatin (LIPITOR) tablet 20 mg  20 mg Oral Nightly MIA Kam CNP   20 mg at 01/11/23 2134    [Held by provider] ferrous sulfate (IRON 325) tablet 325 mg  325 mg Oral Daily with breakfast MIA Kam CNP        gabapentin (NEURONTIN) capsule 200 mg  200 mg Oral Nightly MIA Kam CNP   200 mg at 01/11/23 2134    levothyroxine (SYNTHROID) tablet 75 mcg  75 mcg Oral Daily MIA Kam CNP   75 mcg at 01/11/23 1015    acetaminophen (TYLENOL) tablet 650 mg  650 mg Oral Q6H PRN MIA Kam CNP        Or    acetaminophen (TYLENOL) suppository 650 mg  650 mg Rectal Q6H PRN MIA Kam CNP           Allergies:  Buprenorphine    Social History:   Social History     Socioeconomic History    Marital status:      Spouse name: None    Number of children: None    Years of education: None    Highest education level: None   Tobacco Use    Smoking status: Former     Types: Cigarettes       Family History:   Family History   Family history unknown:  Yes REVIEW OFSYSTEMS:    Review of Systems   Respiratory:  Positive for shortness of breath. Cardiovascular:  Positive for chest pain. Gastrointestinal:  Positive for abdominal pain. PHYSICAL EXAM:  Vitals:    01/12/23 0930 01/12/23 1030 01/12/23 1130 01/12/23 1220   BP: 133/86 (!) 133/96 (!) 123/90 116/79   Pulse: (!) 124 (!) 128 (!) 111 (!) 121   Resp: 24 (!) 33 (!) 32 27   Temp:    98.2 °F (36.8 °C)   TempSrc:    Oral   SpO2: 99% 96% 98% 99%   Weight:       Height:           Physical Exam  Vitals reviewed. Constitutional:       Comments: Answers some questions appropriately   HENT:      Head: Normocephalic and atraumatic. Right Ear: External ear normal.      Left Ear: External ear normal.      Nose: Nose normal.   Eyes:      General:         Right eye: No discharge. Left eye: No discharge. Extraocular Movements: Extraocular movements intact. Cardiovascular:      Rate and Rhythm: Tachycardia present. Pulmonary:      Comments: On vapotherm  +right sided chest tube    Chest:      Chest wall: No tenderness. Abdominal:      Palpations: Abdomen is soft. Tenderness: There is no abdominal tenderness. There is no guarding or rebound. Musculoskeletal:         General: No swelling. Neurological:      Mental Status: She is alert.          DATA:    CBC:   Lab Results   Component Value Date/Time    WBC 21.6 01/12/2023 05:00 AM    RBC 2.88 01/12/2023 05:00 AM    HGB 8.6 01/12/2023 05:00 AM    HCT 27.2 01/12/2023 05:00 AM    MCV 94.4 01/12/2023 05:00 AM    MCH 29.9 01/12/2023 05:00 AM    MCHC 31.6 01/12/2023 05:00 AM    RDW 17.9 01/12/2023 05:00 AM     01/12/2023 05:00 AM    MPV 11.0 01/12/2023 05:00 AM     CBC with Differential:    Lab Results   Component Value Date/Time    WBC 21.6 01/12/2023 05:00 AM    RBC 2.88 01/12/2023 05:00 AM    HGB 8.6 01/12/2023 05:00 AM    HCT 27.2 01/12/2023 05:00 AM     01/12/2023 05:00 AM    MCV 94.4 01/12/2023 05:00 AM    MCH 29.9 01/12/2023 05:00 AM    MCHC 31.6 01/12/2023 05:00 AM    RDW 17.9 01/12/2023 05:00 AM    SEGSPCT 93.5 01/12/2023 05:00 AM    BANDSPCT 14 01/11/2023 03:50 AM    LYMPHOPCT 2.0 01/12/2023 05:00 AM    MONOPCT 3.0 01/12/2023 05:00 AM    MYELOPCT 1 01/11/2023 03:50 AM    BASOPCT 0.1 01/12/2023 05:00 AM    MONOSABS 0.6 01/12/2023 05:00 AM    LYMPHSABS 0.4 01/12/2023 05:00 AM    EOSABS 0.0 01/12/2023 05:00 AM    BASOSABS 0.0 01/12/2023 05:00 AM    DIFFTYPE AUTOMATED DIFFERENTIAL 01/12/2023 05:00 AM     WBC:    Lab Results   Component Value Date/Time    WBC 21.6 01/12/2023 05:00 AM     Platelets:    Lab Results   Component Value Date/Time     01/12/2023 05:00 AM     Hemoglobin/Hematocrit:    Lab Results   Component Value Date/Time    HGB 8.6 01/12/2023 05:00 AM    HCT 27.2 01/12/2023 05:00 AM     CMP:    Lab Results   Component Value Date/Time     01/12/2023 05:00 AM    K 3.3 01/12/2023 05:00 AM     01/12/2023 05:00 AM    CO2 21 01/12/2023 05:00 AM    BUN 8 01/12/2023 05:00 AM    CREATININE 0.5 01/12/2023 05:00 AM    LABGLOM >60 01/12/2023 05:00 AM    GLUCOSE 119 01/12/2023 05:00 AM    PROT 4.6 01/12/2023 05:00 AM    LABALBU 2.5 01/12/2023 05:00 AM    CALCIUM 8.9 01/12/2023 05:00 AM    BILITOT 0.4 01/12/2023 05:00 AM    ALKPHOS 72 01/12/2023 05:00 AM    AST 26 01/12/2023 05:00 AM    ALT 29 01/12/2023 05:00 AM     BMP:    Lab Results   Component Value Date/Time     01/12/2023 05:00 AM    K 3.3 01/12/2023 05:00 AM     01/12/2023 05:00 AM    CO2 21 01/12/2023 05:00 AM    BUN 8 01/12/2023 05:00 AM    LABALBU 2.5 01/12/2023 05:00 AM    CREATININE 0.5 01/12/2023 05:00 AM    CALCIUM 8.9 01/12/2023 05:00 AM    LABGLOM >60 01/12/2023 05:00 AM    GLUCOSE 119 01/12/2023 05:00 AM     Sodium:    Lab Results   Component Value Date/Time     01/12/2023 05:00 AM     Potassium:    Lab Results   Component Value Date/Time    K 3.3 01/12/2023 05:00 AM     BUN/Creatinine:    Lab Results   Component Value Date/Time    BUN 8 01/12/2023 05:00 AM    CREATININE 0.5 01/12/2023 05:00 AM     Hepatic Function Panel:    Lab Results   Component Value Date/Time    ALKPHOS 72 01/12/2023 05:00 AM    ALT 29 01/12/2023 05:00 AM    AST 26 01/12/2023 05:00 AM    PROT 4.6 01/12/2023 05:00 AM    BILITOT 0.4 01/12/2023 05:00 AM    LABALBU 2.5 01/12/2023 05:00 AM     Albumin:    Lab Results   Component Value Date/Time    LABALBU 2.5 01/12/2023 05:00 AM     Calcium:    Lab Results   Component Value Date/Time    CALCIUM 8.9 01/12/2023 05:00 AM     Ionized Calcium:  No results found for: IONCA  Magnesium:    Lab Results   Component Value Date/Time    MG 2.2 01/11/2023 02:01 PM     Phosphorus:  No results found for: PHOS  LDH:    Lab Results   Component Value Date/Time     01/11/2023 03:50 AM     Uric Acid:  No results found for: Kaylin Garcia  PT/INR:    Lab Results   Component Value Date/Time    PROTIME 12.0 01/12/2023 05:00 AM    INR 0.93 01/12/2023 05:00 AM       IMPRESSION:        Patient Active Problem List:     Sepsis, unspecified organism [facility])      68 y/o F with multiple medical comorbidities and 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 Reviewed recent notes from [facility] on Care Everywhere specifically GI notes for EGD and UGI. She underwent EGD just 6 days ago with no stricture, no esophagitis, normal RYGB anatomy, and normal mucosa. Unlikely pt would develop new tumor at GEJ in 6 d or less. Case was discussed with Dr. Cata Ward who also agrees the pt unlikely to develop tumor in such short time period. Given normal appearance of RYGB anatomy on recent upper endoscopy, no acute surgical plans. Recommend PPI. Avoid NSAIDs. Avoid tobacco. Start carafate. If develops N/V then replace NGT. Otherwise, ok for liquids and ADAT.      -Recommend continued GI workup with [facility].     -In unlikely event pt does have GEJ tumor then would need care at [facility] (appears she is already seeing Surg Onc there) as we do not do esophageal surgery at this facility.     -Case d/w Critical Care PA, Poonam Antony.          Winsome Chauhan MD

## 2023-01-12 NOTE — PROGRESS NOTES
Attempted to sit patient in bed-chair position and she refused. Attempted to turn patient with refusal as well. Educated patient on the importance of skin protection.

## 2023-01-12 NOTE — PROGRESS NOTES
CARDIOLOGY  NOTE        Name:  Vaughn Rivero /Age/Sex: 1950  (67 y.o. female)   MRN & CSN:  [id number] & [id number] Admission Date/Time: 1/10/2023 11:34 AM   Location:  -A PCP: [address] Day: 3        PLAN FROM CARDIOLOGY FOR TODAY:   Supportive care      - cardiology consult is for: RONA carrillo with RVR    -  Interval history: Heart rate still high    ASSESSMENT/ PLAN:      Patient is in atrial fibrillation with rapid ventricular response etiology unclear we will continue with negative chronotropic agent and anticoagulation  -Patient has leukocytosis possibly of sepsis hence will obtain an echocardiogram for further assessment and further work-up  -BNP level is elevated suggestive of volume overload          Subjective: Todays complain: Patient  lethargic    HPI:  Ollie Kirby is a 67 y. o.year old who and presents with had concerns including Hematemesis (2 x coffee ground) and Chest Pain. Chief Complaint   Patient presents with    Hematemesis     2 x coffee ground    Chest Pain           Objective: Temperature:  Current - Temp: 98.7 °F (37.1 °C);  Max - Temp  Av.2 °F (36.8 °C)  Min: 97.9 °F (36.6 °C)  Max: 98.7 °F (37.1 °C)    Respiratory Rate : Resp  Av.4  Min: 20  Max: 41    Pulse Range: Pulse  Av.6  Min: 109  Max: 138    Blood Presuure Range:  Systolic (72WSS), TYS:636 , Min:104 , CXA:908   ; Diastolic (87VFG), MZV:81, Min:72, Max:143      Pulse ox Range: SpO2  Av.3 %  Min: 79 %  Max: 100 %    24hr I & O:    Intake/Output Summary (Last 24 hours) at 2023 0953  Last data filed at 2023 0830  Gross per 24 hour   Intake --   Output 1674 ml   Net -1674 ml         /86   Pulse (!) 124   Temp 98.7 °F (37.1 °C) (Oral)   Resp 24   Ht 5' 4\" (1.626 m)   Wt 124 lb 12.5 oz (56.6 kg)   SpO2 99%   BMI 21.42 kg/m²           Review of Systems:    lethargic    TELEMETRY: Atrial fibrillation    has a past medical history of CAD (coronary artery disease), Cataract, Dysphagia, Epiglottiditis, GERD (gastroesophageal reflux disease), GERD (gastroesophageal reflux disease), Hematemesis, History of GI bleed, HLD (hyperlipidemia), Iron deficiency, Mitral regurgitation, Non-rheumatic mitral valve disease, OA (osteoarthritis), PAF (paroxysmal atrial fibrillation) ([facility] [address] Thyroid disease, and VRE (vancomycin resistant enterococcus) culture positive. has a past surgical history that includes Esophagogastroduodenoscopy (01/06/2023); Coronary artery bypass graft; Appendectomy; Breast biopsy (Left, 04/04/2017); Cardiac catheterization; Cardiac valve replacement (2012); Mitral valve repair; Cataract removal (Right, 08/15/2018); Cholecystectomy; Colonoscopy (2013); Coronary artery bypass graft (06/06/2012); Gastric bypass surgery; Hysterectomy; joint replacement (Bilateral); Knee arthroscopy (Right, 2014); Esophagogastroduodenoscopy (07/12/2022); Tonsillectomy; Tubal ligation; and Upper gastrointestinal endoscopy. Physical Exam:  General:  Awake, alert, NAD  Head:normal  Eye: Pupils equal and round  Neck:  No JVD, no carotid bruit noted   Chest:  Clear to auscultation, no signs of respiratory distress  Cardiovascular:  Normal rate and rhythm. S1 and S2 noted.  No murmurs rubs or gallops  Abdomen:   nontender  Extremities:  tr edema  Pulses; palpable  Neuro: grossly normal    Medications:    metoprolol  5 mg IntraVENous Q6H    piperacillin-tazobactam  3,375 mg IntraVENous Q8H    pantoprazole  40 mg IntraVENous BID    citric acid-sodium citrate  30 mL Oral BID    [Held by provider] amiodarone  400 mg Oral Nightly    atorvastatin  20 mg Oral Nightly    [Held by provider] ferrous sulfate  325 mg Oral Daily with breakfast    gabapentin  200 mg Oral Nightly    levothyroxine  75 mcg Oral Daily       potassium chloride **OR** potassium chloride, sodium phosphate IVPB **OR** sodium phosphate IVPB **OR** sodium phosphate IVPB, ketorolac, morphine, prochlorperazine, albuterol sulfate HFA, acetaminophen **OR** acetaminophen    Lab Data:  CBC:   Recent Labs     01/10/23  1144 01/10/23  2025 01/11/23  0350 01/11/23  0823 01/11/23  1401 01/12/23  0500   WBC 31.7*  --  38.1*  --   --  21.6*   HGB 11.3*   < > 9.4* 9.0* 8.8* 8.6*   HCT 36.2*   < > 30.6* 28.7* 27.3* 27.2*   MCV 97.1  --  97.5  --   --  94.4     --  352  --   --  281    < > = values in this interval not displayed. BMP:   Recent Labs     01/10/23  2025 01/11/23  0350 01/11/23  1401 01/12/23  0500    140  --  143   K 3.3* 3.2* 4.1 3.3*    108  --  107   CO2 11* 11*  --  21   BUN 4* 5*  --  8   CREATININE 0.5* 0.5*  --  0.5*     LIVER PROFILE:   Recent Labs     01/10/23  1144 01/11/23  0350 01/12/23  0500   AST 71* 44* 26   ALT 38 37 29   LIPASE 26 46 24   BILITOT 0.5 0.4 0.4   ALKPHOS 84 73 72     PT/INR:   Recent Labs     01/10/23  1144 01/11/23  0600 01/12/23  0500   PROTIME 12.0 13.1 12.0   INR 0.93 1.02 0.93     APTT:   Recent Labs     01/10/23  1144 01/11/23  0600 01/12/23  0500   APTT 29.9 32.6 25.2     BNP:  No results for input(s): BNP in the last 72 hours. TROPONIN: No results for input(s): TROPONINI in the last 72 hours. Recent Labs     01/10/23  1144 01/10/23  2025 01/11/23  0350   TROPONINT 0.019* 0.013* <0.010        Labs, consult, tests reviewed                    Génesis Garcia MD, PA-C 1/12/2023 9:53 AM     Please note this report has been partially produced using speech recognition software and may contain errors related to that system including errors in grammar, punctuation, and spelling, as well as words and phrases that may be inappropriate. If there are any questions or concerns please feel free to contact the dictating provider for clarification.

## 2023-01-12 NOTE — PROGRESS NOTES
V2.0  [facility] Hospitalist Progress Note      Name:  Inez Dominguez /Age/Sex: 1950  (67 y.o. female)   MRN & CSN:  [id number] & [id number] Encounter Date/Time: 2023 8:28 AM EST    Location:  -A PCP: Bert Record Day: 3    Assessment and Plan:   Inez Dominguez is a 67 y.o. female with pmh of erosive gastritis/esophagitis, GERD, HFrEF, A. fib with RVR, permanent pacemaker, nursing home resident who presents with hematemesis and found to be in Sepsis, unspecified organism [facility])      Plan:  Upper GI hemorrhage  ?   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 fib with RVR  Constipation most likely secondary to opioid dependence  High anion gap metabolic acidosis-resolved  Hypothyroidism most likely stress response to sepsis    Plan:  Neuro:  Patient is confused to place, month  Monitor closely-alertness  Monitor in ICU    Cardiovascular:  Off vasopressors, off Cardizem drip  Amiodarone 400 mg-held (no oral access)  Schedule Lopressor IV 5 mg every 6 hours for rate control  Pleural fluid collected on -transudative  Cardiology following    Respiratory:  S/p thoracentesis and a chest tube placement on   Imaging CTA chest with contrast on -reviewed- e/o large hiatal hernia /GE junction mass versus ingested material, with significant distention of esophagus  Possible aspiration pneumonia-started on Zosyn on 1/12/2023  COVID-19 pneumonia-on Decadron 6 mg daily for 10 days  On heated high flow 90% FiO2, 40 L, recommend aggressive pulmonary toileting NT suctioning, monitor closely for airway and possible need for intubation    GI:  Complaining of nausea, abdominal pain  No plans for EGD at this time per GI  Transfer initiated to [facility] for continuity of care  Bariatric surgeon-Dr. Chacon Shall consulted for further work-up of GE junction mass with history of Kellie-en-Y surgery  Continue PPI-twice daily  NPO, Speech and swallow evaluation-consulted      High anion gap metabolic acidosis-resolved  Good urine output  D/alejandro  Bicitra  Monitor I's and O's    Heme:  Hemoglobin stable > 8  Hemodynamically stable  Transfuse for hemoglobin <7  Monitor for signs of bleeding    ID  COVID-19 positive, aspiration pneumonia  Transudative pleural effusion  UTI-urine culture positive for Klebsiella pneumonia  On Decadron 6 mg daily for 10 days  Started on Zosyn  Daily CBC, monitor leukocytosis    Endo  Maintain euglycemia  Hypothyroidism-on Synthroid 75 mcg-continue  TSH elevated-6.190, in the setting of sepsis        Diet ADULT DIET; Clear Liquid   DVT Prophylaxis [] Lovenox, []  Heparin, [x] SCDs, [] Ambulation,  [] Eliquis, [] Xarelto  [] Coumadin   Code Status Full Code   Disposition From: SNF  Expected Disposition: TBD  Estimated Date of Discharge: TBD  Patient requires continued admission due to acute hypoxic respiratory failure, sepsis   Surrogate Decision Maker/ POA Spouse     Subjective:     Chief Complaint: Hematemesis (2 x coffee ground) and Chest Pain       Rachel Candelaria is a 67 y.o. female who presents with hematemesis / coffee-ground emesis X2 from SNF. She was in A. fib RVR on admission, started on Cardizem drip, weaned off on scheduled IV Lopressor 5 mg every 6 hours.   Imaging significant for bilateral pleural effusions, S/P thoracentesis on 1/11 with iatrogenic right-sided pleural effusion s/p chest tube placement. 1/12: Requiring FiO2 90% this morning, possible aspiration pneumonia-started on Zosyn, Dr. Josphine Merlin consulted for bariatric surgery evaluation and evaluation of possible mass         Review of Systems:    Review of Systems  Patient is oriented to self, disoriented to place, month, decline in alertness-lethargic  Admits to chest pain feels like reflux, abdominal pain, nausea, SOB  Denies diarrhea, motor or sensory changes    Objective: Intake/Output Summary (Last 24 hours) at 1/12/2023 6525  Last data filed at 1/12/2023 0730  Gross per 24 hour   Intake --   Output 1624 ml   Net -1624 ml        Vitals:   Vitals:    01/12/23 0730   BP: 109/78   Pulse: (!) 110   Resp: 26   Temp: 98.7 °F (37.1 °C)   SpO2: 97%       Physical Exam:     General: Patient is a 78-year-old female, appears ill, mild respiratory distress   Eyes: EOMI  ENT: neck supple  Cardiovascular: A. fib with RVR  Respiratory: Bilateral rhonchi and rales in the lower lobes  Gastrointestinal: Diffuse abdominal tenderness +  genitourinary: no suprapubic tenderness  Musculoskeletal: No edema  Skin: warm, dry  Neuro: Lethargic, confused, no focal neurological deficits, CN grossly intact  Psych: Mood appropriate.      Medications:   Medications:    metoprolol  5 mg IntraVENous Q6H    piperacillin-tazobactam  3,375 mg IntraVENous Q8H    pantoprazole  40 mg IntraVENous BID    citric acid-sodium citrate  30 mL Oral BID    dexamethasone  6 mg IntraVENous Q24H    [Held by provider] amiodarone  400 mg Oral Nightly    atorvastatin  20 mg Oral Nightly    [Held by provider] ferrous sulfate  325 mg Oral Daily with breakfast    gabapentin  200 mg Oral Nightly    levothyroxine  75 mcg Oral Daily      Infusions:   PRN Meds: potassium chloride, 20 mEq, PRN   Or  potassium chloride, 10 mEq, PRN  sodium phosphate IVPB, 10 mmol, PRN   Or  sodium phosphate IVPB, 15 mmol, PRN   Or  sodium phosphate IVPB, 20 mmol, PRN  ketorolac, 15 mg, Q6H PRN  morphine, 2 mg, Q4H PRN  prochlorperazine, 10 mg, Q6H PRN  albuterol sulfate HFA, 2 puff, Q6H PRN  acetaminophen, 650 mg, Q6H PRN   Or  acetaminophen, 650 mg, Q6H PRN        Labs      Recent Results (from the past 24 hour(s))   Respiratory Panel, Molecular, with COVID-19 (Restricted: peds pts or suitable admitted adults)    Collection Time: 01/11/23  8:54 AM    Specimen: Nasopharyngeal   Result Value Ref Range    Adenovirus Detection by PCR NOT DETECTED NOT DETECTED    Coronavirus 229E PCR NOT DETECTED NOT DETECTED    Coronavirus HKU1 PCR NOT DETECTED NOT DETECTED    Coronavirus NL63 PCR NOT DETECTED NOT DETECTED    Coronavirus OC43 PCR NOT DETECTED NOT DETECTED    SARS-CoV-2 DETECTED BY PCR (A) NOT DETECTED    Human Metapneumovirus PCR NOT DETECTED NOT DETECTED    Rhinovirus Enterovirus PCR NOT DETECTED NOT DETECTED    Influenza A by PCR NOT DETECTED NOT DETECTED    Influenza A H1 Pandemic PCR NOT DETECTED NOT DETECTED    Influenza A H1 (2009) PCR NOT DETECTED NOT DETECTED    Influenza A H3 PCR NOT DETECTED NOT DETECTED    Influenza B by PCR NOT DETECTED NOT DETECTED    Parainfluenza 1 PCR NOT DETECTED NOT DETECTED    Parainfluenza 2 PCR NOT DETECTED NOT DETECTED    Parainfluenza 3 PCR NOT DETECTED NOT DETECTED    Parainfluenza 4 PCR NOT DETECTED NOT DETECTED    RSV PCR NOT DETECTED NOT DETECTED    Bordetella parapertussis by PCR NOT DETECTED NOT DETECTED    B Pertussis by PCR NOT DETECTED NOT DETECTED    Chlamydophila Pneumonia PCR NOT DETECTED NOT DETECTED    Mycoplasma pneumo by PCR NOT DETECTED NOT DETECTED   Hemoglobin and Hematocrit    Collection Time: 01/11/23  2:01 PM   Result Value Ref Range    Hemoglobin 8.8 (L) 12.5 - 16.0 GM/DL    Hematocrit 27.3 (L) 37 - 47 %   Potassium    Collection Time: 01/11/23  2:01 PM   Result Value Ref Range    Potassium 4.1 3.5 - 5.1 MMOL/L   Magnesium    Collection Time: 01/11/23  2:01 PM   Result Value Ref Range    Magnesium 2.2 1.8 - 2.4 mg/dl   EKG 12 Lead Collection Time: 01/12/23 12:15 AM   Result Value Ref Range    Ventricular Rate 131 BPM    Atrial Rate 300 BPM    QRS Duration 92 ms    Q-T Interval 334 ms    QTc Calculation (Bazett) 493 ms    R Axis 215 degrees    T Axis 202 degrees    Diagnosis       Atrial flutter with variable AV block  Right superior axis deviation  Low voltage QRS  Cannot rule out Anterior infarct (cited on or before 10-DIXIE-2023)  Abnormal ECG  When compared with ECG of 10-DIXIE-2023 11:31,  Atrial flutter has replaced Atrial fibrillation  Questionable change in initial forces of Lateral leads  Nonspecific T wave abnormality has replaced inverted T waves in Inferior leads     Vancomycin Level, Random    Collection Time: 01/12/23  5:00 AM   Result Value Ref Range    Vancomycin Rm 9.5 UG/ML    DOSE AMOUNT DOSE AMT.  GIVEN - 750MG     DOSE TIME DOSE TIME GIVEN - 0600    CBC with Auto Differential    Collection Time: 01/12/23  5:00 AM   Result Value Ref Range    WBC 21.6 (H) 4.0 - 10.5 K/CU MM    RBC 2.88 (L) 4.2 - 5.4 M/CU MM    Hemoglobin 8.6 (L) 12.5 - 16.0 GM/DL    Hematocrit 27.2 (L) 37 - 47 %    MCV 94.4 78 - 100 FL    MCH 29.9 27 - 31 PG    MCHC 31.6 (L) 32.0 - 36.0 %    RDW 17.9 (H) 11.7 - 14.9 %    Platelets 313 257 - 259 K/CU MM    MPV 11.0 7.5 - 11.1 FL    Differential Type AUTOMATED DIFFERENTIAL     Segs Relative 93.5 (H) 36 - 66 %    Lymphocytes % 2.0 (L) 24 - 44 %    Monocytes % 3.0 0 - 4 %    Eosinophils % 0.0 0 - 3 %    Basophils % 0.1 0 - 1 %    Segs Absolute 20.2 K/CU MM    Lymphocytes Absolute 0.4 K/CU MM    Monocytes Absolute 0.6 K/CU MM    Eosinophils Absolute 0.0 K/CU MM    Basophils Absolute 0.0 K/CU MM    Nucleated RBC % 0.1 %    Total Nucleated RBC 0.0 K/CU MM    Total Immature Neutrophil 0.30 K/CU MM    Immature Neutrophil % 1.4 (H) 0 - 0.43 %   Amylase    Collection Time: 01/12/23  5:00 AM   Result Value Ref Range    Amylase 56 25 - 115 U/L   Comprehensive Metabolic Panel    Collection Time: 01/12/23  5:00 AM   Result Value Ref Range    Sodium 143 135 - 145 MMOL/L    Potassium 3.3 (L) 3.5 - 5.1 MMOL/L    Chloride 107 99 - 110 mMol/L    CO2 21 21 - 32 MMOL/L    BUN 8 6 - 23 MG/DL    Creatinine 0.5 (L) 0.6 - 1.1 MG/DL    Est, Glom Filt Rate >60 >60 mL/min/1.73m2    Glucose 119 (H) 70 - 99 MG/DL    Calcium 8.9 8.3 - 10.6 MG/DL    Albumin 2.5 (L) 3.4 - 5.0 GM/DL    Total Protein 4.6 (L) 6.4 - 8.2 GM/DL    Total Bilirubin 0.4 0.0 - 1.0 MG/DL    ALT 29 10 - 40 U/L    AST 26 15 - 37 IU/L    Alkaline Phosphatase 72 40 - 128 IU/L    Anion Gap 15 4 - 16   Lipase    Collection Time: 01/12/23  5:00 AM   Result Value Ref Range    Lipase 24 13 - 60 IU/L   Protime/INR & PTT    Collection Time: 01/12/23  5:00 AM   Result Value Ref Range    Protime 12.0 11.7 - 14.5 SECONDS    INR 0.93 INDEX    aPTT 25.2 25.1 - 37.1 SECONDS   Blood Gas, Venous    Collection Time: 01/12/23  5:00 AM   Result Value Ref Range    pH, Alonso 7.38 7.32 - 7.42    pCO2, Alonso 36 (L) 38 - 52 mmHG    pO2, Alonso 79 (H) 28 - 48 mmHG    Base Excess 3 (H) 0 - 2.4    HCO3, Venous 21.3 19 - 25 MMOL/L    O2 Sat, Alonso 93.4 (H) 50 - 70 %    Comment VBG         Imaging/Diagnostics Last 24 Hours   CT ABDOMEN PELVIS WO CONTRAST Additional Contrast? None    Result Date: 1/10/2023  EXAMINATION: CT OF THE ABDOMEN AND PELVIS WITHOUT CONTRAST 1/10/2023 1:42 pm TECHNIQUE: CT of the abdomen and pelvis was performed without the administration of intravenous contrast. Multiplanar reformatted images are provided for review. Automated exposure control, iterative reconstruction, and/or weight based adjustment of the mA/kV was utilized to reduce the radiation dose to as low as reasonably achievable. COMPARISON: None.  HISTORY: ORDERING SYSTEM PROVIDED HISTORY: abdominal pain, hematemesis TECHNOLOGIST PROVIDED HISTORY: Reason for exam:->abdominal pain, hematemesis Additional Contrast?->None Decision Support Exception - unselect if not a suspected or confirmed emergency medical condition->Emergency Medical Condition (MA) Reason for Exam: abdominal pain, hematemesis FINDINGS: Lower Chest: There is a partially calcified soft tissue mass noted in the GE junction. It measures 10 x 6.6 x 9 cm. The appearance suggests a leiomyosarcoma or GIST. There are moderate bilateral pleural effusions with compressive atelectasis in the lung bases. Patient is status post sternotomy. Pacer leads are in good position. Organs: The the liver, pancreas appear unremarkable. There are calcified granulomas in the the spleen. The patient is status post cholecystectomy. The adrenal glands and kidneys are unremarkable. GI/Bowel: Small bowel loops appear unremarkable. There is impaction of the rectum with dense barium. Pelvis: There is a Catalan catheter in the bladder. The patient is status post hysterectomy. Peritoneum/Retroperitoneum: Unremarkable Bones/Soft Tissues: Osteoarthritic changes are noted in lower lumbar facet joints. Large partially calcified mass noted in the region of the GE junction. It measures 10 x 6.6 x 9 cm. This may represent a leiomyosarcoma or GIST. Moderate bilateral pleural effusion with compressive atelectasis in lung bases. Dense barium impaction of the rectum. Patient is status post cholecystectomy and hysterectomy. Calcified granulomas noted in the spleen. Catalan catheter in the bladder. XR ABDOMEN (KUB) (SINGLE AP VIEW)    Result Date: 1/11/2023  EXAMINATION: ONE SUPINE XRAY VIEW(S) OF THE ABDOMEN 1/11/2023 5:44 am COMPARISON: CT abdomen pelvis dated 01/10/2023. HISTORY: ORDERING SYSTEM PROVIDED HISTORY: ng tube insertion TECHNOLOGIST PROVIDED HISTORY: Reason for exam:->ng tube insertion Reason for Exam: ng tube insertion FINDINGS: Evaluation is partially limited due to multiple lines overlying the lower chest and upper abdomen, presumably outside of the patient.   Enteric tube is seen with the distal portions of the tube coiled within the lower chest/upper abdomen coursing superiorly with the tip located overlying the right perihilar region. Repositioning is recommended. Partially visualized are leads of a cardiac conduction device and changes of prior open heart surgery. Nonspecific nonobstructive bowel gas pattern is seen within the visualized upper abdomen. There is airspace opacity of the bilateral mid to lower lung field. Distal pole is coiled within the lower chest and upper abdomen coursing superiorly with the tip overlying the right perihilar region. Recommend repositioning. Airspace opacity of the bilateral mid to lower lung field. The findings were sent to the Radiology Results [id number] at 7:55 am on 1/11/2023 to be communicated to a licensed caregiver. CT ABDOMEN PELVIS W IV CONTRAST Additional Contrast? None    Result Date: 1/12/2023  EXAMINATION: CT OF THE ABDOMEN AND PELVIS WITH CONTRAST; CTA OF THE CHEST 1/11/2023 10:15 pm TECHNIQUE: CT of the abdomen and pelvis was performed with the administration of intravenous contrast. Multiplanar reformatted images are provided for review. Automated exposure control, iterative reconstruction, and/or weight based adjustment of the mA/kV was utilized to reduce the radiation dose to as low as reasonably achievable.; CTA of the chest was performed after the administration of intravenous contrast.  Multiplanar reformatted images are provided for review. MIP images are provided for review. Automated exposure control, iterative reconstruction, and/or weight based adjustment of the mA/kV was utilized to reduce the radiation dose to as low as reasonably achievable. COMPARISON: January 10, 2023. HISTORY: ORDERING SYSTEM PROVIDED HISTORY: GE juction Mass. TECHNOLOGIST PROVIDED HISTORY: Reason for exam:->GE juction Mass.  Additional Contrast?->None Reason for Exam: GE juction Mass.; ORDERING SYSTEM PROVIDED HISTORY: r/o PE TECHNOLOGIST PROVIDED HISTORY: Reason for exam:->r/o PE Reason for Exam: r/o PE FINDINGS: Chest: Mediastinum: The heart is enlarged without a pericardial effusion. Severe coronary artery atherosclerosis is noted. The patient appears to be status post CABG. The aorta and arch branches are normal in course and caliber. The main pulmonary artery is patent and normal in course and caliber. No filling defects are identified in the pulmonary artery to suggest pulmonary embolus. No pathologically enlarged lymph nodes. The esophagus is significantly distended throughout its course. Lungs/pleura: Small bilateral pleural effusions with diffuse airspace opacities. Trace right pneumothorax is noted with a chest tube in place. The trachea is patent. Mild bronchial wall thickening. No significant bronchiectasis. Soft Tissues/Bones: No acute or aggressive osseous lesion. Abdomen/Pelvis: Organs: Status post cholecystectomy. No intra or extrahepatic biliary dilatation. The liver, spleen, adrenal glands, and kidneys demonstrate no acute abnormality. The collecting system is normal. The pancreas demonstrates a cyst in the body measuring 19 x 12 mm. No ductal dilatation or pancreatic atrophy. GI/Bowel: A large hiatal hernia is identified with heterogeneous high density in the dependent portions measuring approximately 8.1 x 5.5 cm. Patient is status post Kellie-en-Y gastric bypass. Elsewhere, small and large bowel are normal in caliber without evidence of wall thickening or obstruction. Oral contrast is concentrated in the rectum. Pelvis: Bladder is decompressed. Status post hysterectomy. No adnexal masses. Peritoneum/Retroperitoneum: No free fluid or free air. No pathologic lymphadenopathy. Aorta and its branches are patent. Severe atherosclerosis of the abdominal aorta is seen without aneurysmal dilatation. Bones/Soft Tissues: Anasarca is noted. No acute or aggressive osseous lesion. Bony demineralization is seen. 1. No pulmonary embolus. 2. Large hiatal hernia with heterogeneous high density in the dependent portion.   It is unclear if this represents ingested material (favored) or underlying mass. Patient is status post Kellie-en-Y gastric bypass. Direct visualization is recommended. 3. Significant distention of the esophagus throughout its course. Internal debris is noted throughout. 4. Small bilateral pleural effusions with diffuse airspace opacities compatible with infection with possible superimposed aspiration. 5. Trace right pneumothorax with chest tube in place. 6. Pancreatic body cyst measures 19 x 12 mm. Recommend reimaging in 1 year. XR CHEST PORTABLE    Result Date: 1/12/2023  EXAMINATION: ONE XRAY VIEW OF THE CHEST 1/11/2023 2:45 am COMPARISON: January 11, 2023. HISTORY: ORDERING SYSTEM PROVIDED HISTORY: chest tube TECHNOLOGIST PROVIDED HISTORY: Reason for exam:->chest tube Reason for Exam: chest tube FINDINGS: Right chest tube is now present. Nasogastric tube again identified. Left chest wall pacer in place. Cardiomediastinal silhouette is enlarged. There is interval decrease in size of a now small right pneumothorax. Persistent basilar opacities and trace effusions are seen. No acute osseous abnormality. 1. New right chest tube with interval decrease in size of a now small right pneumothorax. 2. Stable basilar opacities and trace effusions. XR CHEST PORTABLE    Result Date: 1/10/2023  EXAMINATION: ONE XRAY VIEW OF THE CHEST 1/10/2023 11:50 am COMPARISON: None. HISTORY: ORDERING SYSTEM PROVIDED HISTORY: Chest pain TECHNOLOGIST PROVIDED HISTORY: Reason for exam:->Chest pain Reason for Exam: chest pain FINDINGS: There are postsurgical changes of median sternotomy. The heart size is enlarged. The pulmonary vasculature is within normal limits. There is increased density involving the lower lung zones bilaterally with blunting of the costophrenic angles. There is a left subclavian AICD. 1. Cardiomegaly without overt failure. 2. Small bilateral pleural effusions with lower lobe airspace disease.   I would recommend follow-up to resolution. XR CHEST 1 VIEW    Result Date: 1/12/2023  EXAMINATION: ONE XRAY VIEW OF THE CHEST 1/11/2023 2:03 am COMPARISON: January 10, 2023. HISTORY: ORDERING SYSTEM PROVIDED HISTORY: got BL thoracentesis TECHNOLOGIST PROVIDED HISTORY: Reason for exam:->got BL thoracentesis Reason for Exam: got BL thoracentesis FINDINGS: Nasogastric tube appears to be within a hiatal hernia. Left chest wall pacer in place. Cardiomediastinal silhouette is enlarged. The lungs show interval appearance of a large right pneumothorax. No evidence of tension. No discrete left pneumothorax is identified. Basilar airspace opacities are noted with trace effusions. No new osseous abnormality. Status post sternotomy. 1. Nasogastric tube is coiled within the hiatal hernia. 2. New large right pneumothorax. No evidence of tension. 3. Persistent basilar opacities with trace effusions. Patient is status post bilateral thoracentesis. 4. Cardiomegaly. The findings were sent to the Radiology Results [id number] at 2:37 am on 1/11/2023 to be communicated to a licensed caregiver. VL DUP UPPER EXTREMITY VENOUS BILATERAL    Result Date: 1/11/2023  EXAMINATION: DUPLEX ULTRASOUND OF THE BILATERAL UPPER EXTREMITIES FOR DVT, 1/11/2023 12:02 pm TECHNIQUE: Duplex ultrasound using B-mode/gray scaled imaging and Doppler spectral analysis and color flow was obtained of the deep venous structures of the bilateral upper extremities. COMPARISON: None. HISTORY: ORDERING SYSTEM PROVIDED HISTORY: edema TECHNOLOGIST PROVIDED HISTORY: Reason for exam:->edema FINDINGS: STUDY LIMITED BY EDEMA AND BANDAGES ON THE UPPER EXTREMITIES Visualized right and left upper extremity veins demonstrate unremarkable flow and compressibility. There is no evidence of echogenic thrombus. The veins demonstrate good compressibility with normal color flow study and spectral analysis.      Study limited by presence of upper extremity edema and dressings however, visualized upper extremity veins demonstrate no sonographic/Doppler evidence of DVT. CTA CHEST W CONTRAST    Result Date: 1/12/2023  EXAMINATION: CT OF THE ABDOMEN AND PELVIS WITH CONTRAST; CTA OF THE CHEST 1/11/2023 10:15 pm TECHNIQUE: CT of the abdomen and pelvis was performed with the administration of intravenous contrast. Multiplanar reformatted images are provided for review. Automated exposure control, iterative reconstruction, and/or weight based adjustment of the mA/kV was utilized to reduce the radiation dose to as low as reasonably achievable.; CTA of the chest was performed after the administration of intravenous contrast.  Multiplanar reformatted images are provided for review. MIP images are provided for review. Automated exposure control, iterative reconstruction, and/or weight based adjustment of the mA/kV was utilized to reduce the radiation dose to as low as reasonably achievable. COMPARISON: January 10, 2023. HISTORY: ORDERING SYSTEM PROVIDED HISTORY: GE [employer] Mass. TECHNOLOGIST PROVIDED HISTORY: Reason for exam:->GE juction Mass. Additional Contrast?->None Reason for Exam: GE juction Mass.; ORDERING SYSTEM PROVIDED HISTORY: r/o PE TECHNOLOGIST PROVIDED HISTORY: Reason for exam:->r/o PE Reason for Exam: r/o PE FINDINGS: Chest: Mediastinum: The heart is enlarged without a pericardial effusion. Severe coronary artery atherosclerosis is noted. The patient appears to be status post CABG. The aorta and arch branches are normal in course and caliber. The main pulmonary artery is patent and normal in course and caliber. No filling defects are identified in the pulmonary artery to suggest pulmonary embolus. No pathologically enlarged lymph nodes. The esophagus is significantly distended throughout its course. Lungs/pleura: Small bilateral pleural effusions with diffuse airspace opacities. Trace right pneumothorax is noted with a chest tube in place. The trachea is patent. Mild bronchial wall thickening. No significant bronchiectasis. Soft Tissues/Bones: No acute or aggressive osseous lesion. Abdomen/Pelvis: Organs: Status post cholecystectomy. No intra or extrahepatic biliary dilatation. The liver, spleen, adrenal glands, and kidneys demonstrate no acute abnormality. The collecting system is normal. The pancreas demonstrates a cyst in the body measuring 19 x 12 mm. No ductal dilatation or pancreatic atrophy. GI/Bowel: A large hiatal hernia is identified with heterogeneous high density in the dependent portions measuring approximately 8.1 x 5.5 cm. Patient is status post Kellie-en-Y gastric bypass. Elsewhere, small and large bowel are normal in caliber without evidence of wall thickening or obstruction. Oral contrast is concentrated in the rectum. Pelvis: Bladder is decompressed. Status post hysterectomy. No adnexal masses. Peritoneum/Retroperitoneum: No free fluid or free air. No pathologic lymphadenopathy. Aorta and its branches are patent. Severe atherosclerosis of the abdominal aorta is seen without aneurysmal dilatation. Bones/Soft Tissues: Anasarca is noted. No acute or aggressive osseous lesion. Bony demineralization is seen. 1. No pulmonary embolus. 2. Large hiatal hernia with heterogeneous high density in the dependent portion. It is unclear if this represents ingested material (favored) or underlying mass. Patient is status post Kellie-en-Y gastric bypass. Direct visualization is recommended. 3. Significant distention of the esophagus throughout its course. Internal debris is noted throughout. 4. Small bilateral pleural effusions with diffuse airspace opacities compatible with infection with possible superimposed aspiration. 5. Trace right pneumothorax with chest tube in place. 6. Pancreatic body cyst measures 19 x 12 mm. Recommend reimaging in 1 year.      XR ABDOMEN FOR NG/OG/NE TUBE PLACEMENT    Result Date: 1/11/2023  EXAMINATION: ONE SUPINE XRAY VIEW(S) OF THE ABDOMEN 1/11/2023 8:40 am COMPARISON: 01/11/2023 at 0552 hours HISTORY: ORDERING SYSTEM PROVIDED HISTORY: NG pulled back 5 TECHNOLOGIST PROVIDED HISTORY: Reason for exam:->NG pulled back 5 Portable? ->Yes Reason for Exam: NG pulled back 5 FINDINGS: Nasogastric tube tip and side port curled within decompressed stomach and projects in satisfactory position. No other significant portable radiographic change noted. Persistent bilateral mid-basilar consolidating infiltrates, cardiomegaly, postoperative changes again seen. No detectable pneumothorax or large pleural effusion. Nasogastric tube tip and side port projecting within decompressed stomach in satisfactory position. No other significant interval change compared to prior study.        Electronically signed by Heather Guerin PA-C on 1/12/2023 at 8:28 AM

## 2023-01-12 NOTE — PROGRESS NOTES
01/12/23 0644   Encounter Summary   Encounter Overview/Reason  Spiritual/Emotional Needs   Service Provided For: Patient   [facility]   Support System Spouse; Children   Last Encounter  01/12/23  (Offered prayer and emotional support. Patient continues to state that she wants to go home.)   Complexity of Encounter Moderate   Begin Time 0630   End Time  0646   Total Time Calculated 16 min   Encounter    Type Initial Screen/Assessment   Spiritual/Emotional needs   Type Emotional Distress   Grief, Loss, and Adjustments   Type Adjustment to illness   Assessment/Intervention/Outcome   Assessment Anxious; Coping; Sad   Intervention Active listening;Discussed illness injury and its impact; Discussed relationship with God;Prayer (assurance of)/Shirley;Sustaining Presence/Ministry of presence   Outcome Encouraged;Engaged in conversation;Expressed Gratitude; Less anxious, Less agitated   Plan and Referrals   Plan/Referrals Continue to visit, (comment) Additional Safety/Bands:

## 2023-01-12 NOTE — PROGRESS NOTES
Comprehensive Nutrition Assessment    Type and Reason for Visit:  Positive Nutrition Screen    Nutrition Recommendations/Plan:   Will f/u for NFPE tomorrow  Advance diet when medically appropriate  Will monitor ability to initiate nutrition, nutrition status, poc     Malnutrition Assessment:  Malnutrition Status: At risk for malnutrition (Comment) (01/12/23 3322)    Context:  Acute Illness       Nutrition Assessment:    Pt admitted for metabolic acidosis, upper GI bleed, gastric mass, Afib, sepsis, PMH: CAD, thyroid disease, PAF, HLD, h/o samuel en y, positive nutrition screen for unsure of weight loss and decreased appetite, pt currently NPO, non clinically significant wt loss noted per weight hx review, pt unavailable at time of visit x 2, will continue to follow at high nutrition risk    Nutrition Related Findings:      Wound Type: None       Current Nutrition Intake & Therapies:    Average Meal Intake: NPO  Average Supplements Intake: NPO  Diet NPO    Anthropometric Measures:  Height: 5' 4.02\" (162.6 cm)  Ideal Body Weight (IBW): 120 lbs (55 kg)       Current Body Weight: 124 lb 12.5 oz (56.6 kg), 104 % IBW.  Weight Source: Bed Scale  Current BMI (kg/m2): 21.4  Usual Body Weight: 128 lb 12 oz (58.4 kg) ((9/27/22) 10/12/22-56.7 kg-office visits)  % Weight Change (Calculated): -3.1  Weight Adjustment For: No Adjustment  BMI Categories: Underweight (BMI less than 22) age over 72    Estimated Daily Nutrient Needs:  Energy Requirements Based On: Kcal/kg  Weight Used for Energy Requirements: Current  Energy (kcal/day): 3291-0468 (30-35 kcal/kg)  Weight Used for Protein Requirements: Current  Protein (g/day): 68-79 (1.2-1.4 g/kg)  Method Used for Fluid Requirements: 1 ml/kcal     Nutrition Diagnosis:   Inadequate oral intake related to acute injury/trauma as evidenced by NPO or clear liquid status due to medical condition    Nutrition Interventions:   Food and/or Nutrient Delivery:  (advance diet when medically appropriate)  Nutrition Education/Counseling: No recommendation at this time  Coordination of Nutrition Care: Continue to monitor while inpatient       Goals:     Goals: Initiate PO diet, within 2 days       Nutrition Monitoring and Evaluation:   Behavioral-Environmental Outcomes: None Identified  Food/Nutrient Intake Outcomes: Diet Advancement/Tolerance  Physical Signs/Symptoms Outcomes: Biochemical Data, GI Status, Weight, Hemodynamic Status, Fluid Status or Edema, Nutrition Focused Physical Findings, Skin    Discharge Planning:     Too soon to determine     Lesley Rosado [address], [address] [place] [address],   Contact: [phone]

## 2023-01-12 NOTE — PROGRESS NOTES
I have personally seen and examined the patient independently. I have reviewed the patient's available data,including medical history and recent test results. I reviewed and discussed detailed documentation as provided by RONA Yoon PA-C. I agree with the physical exam findings, assessment and plan. My documented complex medical decision making constitutes a substantive portion of the supervisory note. Acute respiratory failure with hypoxia  Suspected aspiration pneumonia/pneumonitis  Chronic systolic diastolic heart failure, concurrent atrial fibrillation (chronic), PPM  Remote CABG  Bilateral pleural effusions (transudative, mixed cellularity)  Post procedure right pneumothorax, small bore chest tube management (absence of air leak)  GI bleeding, upper (suspected)  History Kellie en Y bypass, \"mass\" noted at GE junction (suspected inflammatory, hemorrhagic lesion \"unlikely\" malignant given time frame of appearance from serial imaging)  Debilitation  Protein, calorie malnutrition  Hypothyroidism    Continue O2 support, current HFNC system  Pulmonary hygiene measure  ATB coverage for possible/suspect aspiration  Medical management Atrial fibrillation minus anticoagulation  PPI  Serial H/H  GI consultation/input appreciated, as previously discussed, consult bariatric surgery for opinion regarding additional evaluation, management of GE junction lesion ([facility] contacted 1/11/2023 given serial evaluations for this issues at that facility, reportedly no beds available). Complex medical decisions reviewed in detail during critical care rounds. I updated family at bedside this morning, they prefer that patient remain here for ongoing care, evaluation and not transfer to [facility].     Elisha Kawasaki  [phone]

## 2023-01-12 NOTE — PROGRESS NOTES
V2.0  [facility] Hospitalist Progress Note      Name:  Etelvina Campbell /Age/Sex: 1950  (67 y.o. female)   MRN & CSN:  [id number] & [id number] Encounter Date/Time: 2023 8:41 PM EST    Location:  -A PCP: [facility] Day: 2    Assessment and Plan:   Etelvina Campbell is a 67 y.o. 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    Bilateral pleural effusions  -EF 35% per echo 2022    S/p thoracentesis 2022  -Follow-up final labs from thoracentesis    Pneumothorax status post chest tube 2022     COVID positive-dexamethasone started   -Is out of isolation. Initially sepsis noted upon admission-felt to be due to pneumonia--cefepime and vancomycin then stopped on     Metabolic acidosis noted on admission  -Monitor    Chest pain with elevated troponin  -Troponin went from 0.019-0.013 to less than 0.01-likely due to demand ischemia    Constipation with impacted stool noted upon admission  -Bowel regimen    Chronic pain, opiate dependence    S/p A-line 2022    Hypertension  Hyperlipidemia  BMI 18.9  Hypothyroidism  B12 deficiency    Please see H&P for more details    Diet Diet NPO   DVT Prophylaxis [] Lovenox, []  Heparin, [] SCDs, [] Ambulation,  [] Eliquis, [] Xarelto  [] Coumadin   Code Status Full Code   Disposition From: [facility]  Expected Disposition:  To be determined  Estimated Date of Discharge: Currently with chest tube  Patient requires continued admission due to currently with chest tube   Surrogate Decision Maker/ POA  Conception Smolder is listed as the alternate contact in the chart     Subjective:     Chief Complaint: Hematemesis (2 x coffee ground) and Chest Pain       Etelvina Campbell is a 67 y.o. female who presents with hematemesis      She was on Vapotherm 90% when I saw her  She has a right chest tube  She was tachycardic on the monitor-later fluids were started  She was supine in bed  Her  and son were at the bedside         Review of Systems:    Review of Systems    No fever or headache reported    Objective: Intake/Output Summary (Last 24 hours) at 1/11/2023 2041  Last data filed at 1/11/2023 1837  Gross per 24 hour   Intake 379.86 ml   Output 535 ml   Net -155.14 ml        Vitals:   Vitals:    01/11/23 1819   BP:    Pulse:    Resp: 29   Temp:    SpO2:        Physical Exam:     General: Ill-appearing  Eyes: EOMI  Cardiovascular: Tachycardic  Skin: warm, dry  Neuro: Alert. Psych: Mood appropriate.      Medications:   Medications:    pantoprazole  40 mg IntraVENous BID    citric acid-sodium citrate  30 mL Oral BID    dexamethasone  6 mg IntraVENous Q24H    [Held by provider] amiodarone  400 mg Oral Nightly    atorvastatin  20 mg Oral Nightly    [Held by provider] ferrous sulfate  325 mg Oral Daily with breakfast    gabapentin  200 mg Oral Nightly    levothyroxine  75 mcg Oral Daily      Infusions:    sodium chloride 75 mL/hr at 01/11/23 1842     PRN Meds: potassium chloride, 20 mEq, PRN   Or  potassium chloride, 10 mEq, PRN  sodium phosphate IVPB, 10 mmol, PRN   Or  sodium phosphate IVPB, 15 mmol, PRN   Or  sodium phosphate IVPB, 20 mmol, PRN  ketorolac, 15 mg, Q6H PRN  morphine, 2 mg, Q4H PRN  prochlorperazine, 10 mg, Q6H PRN  albuterol sulfate HFA, 2 puff, Q6H PRN  acetaminophen, 650 mg, Q6H PRN   Or  acetaminophen, 650 mg, Q6H PRN        Labs      Recent Results (from the past 24 hour(s))   Cell Count with Differential, Body Fluid    Collection Time: 01/11/23  1:40 AM   Result Value Ref Range    Fluid Type PLEURAL FLUID (LEFT) INDEX    RBC, Fluid 278 /CU MM    WBC, Fluid 475 /CU MM    Neutrophil Count, Fluid 48 %    Lymphocytes, Body Fluid 4 %    Monocyte Count, Fluid 18 MON/MACROPHAGES % Mesothelial, Fluid 29 /100 WBC    Other Cells, Fluid 1 PLASMOID TYPE LYMPH NOTED SEE REPORT    Protein, body fluid    Collection Time: 01/11/23  1:40 AM   Result Value Ref Range    Protein, Fluid 1.3 GM/DL   Lactate dehydrogenase, body fluid    Collection Time: 01/11/23  1:40 AM   Result Value Ref Range    LD, Fluid 171 IU/L   Culture, Body Fluid    Collection Time: 01/11/23  1:40 AM    Specimen: Pleural; Body Fluid   Result Value Ref Range    Specimen PLEURAL     Special Requests Left lung    Glucose, body fluid    Collection Time: 01/11/23  1:40 AM   Result Value Ref Range    Glucose, Fluid 153 MG/DL   Cell Count with Differential, Body Fluid    Collection Time: 01/11/23  1:44 AM   Result Value Ref Range    Fluid Type PLEURAL FLUID (RIGHT) INDEX    RBC, Fluid 6,000 /CU MM    WBC, Fluid 778 /CU MM    Neutrophil Count, Fluid 42 %    Lymphocytes, Body Fluid 9 %    Monocyte Count, Fluid 27 MONO/MACROPHAGES %    Mesothelial, Fluid 22 /100 WBC    Other Cells, Fluid SEE REPORT    Lactate dehydrogenase, body fluid    Collection Time: 01/11/23  1:44 AM   Result Value Ref Range    LD, Fluid 210 IU/L   pH, body fluid    Collection Time: 01/11/23  1:44 AM   Result Value Ref Range    pH, Fluid 7.5    Protein, body fluid    Collection Time: 01/11/23  1:44 AM   Result Value Ref Range    Protein, Fluid 1.4 GM/DL   Glucose, body fluid    Collection Time: 01/11/23  1:44 AM   Result Value Ref Range    Glucose, Fluid 157 MG/DL   Amylase    Collection Time: 01/11/23  3:50 AM   Result Value Ref Range    Amylase 105 25 - 115 U/L   CBC with Auto Differential    Collection Time: 01/11/23  3:50 AM   Result Value Ref Range    WBC 38.1 (HH) 4.0 - 10.5 K/CU MM    RBC 3.14 (L) 4.2 - 5.4 M/CU MM    Hemoglobin 9.4 (L) 12.5 - 16.0 GM/DL    Hematocrit 30.6 (L) 37 - 47 %    MCV 97.5 78 - 100 FL    MCH 29.9 27 - 31 PG    MCHC 30.7 (L) 32.0 - 36.0 %    RDW 17.5 (H) 11.7 - 14.9 %    Platelets 985 628 - 874 K/CU MM    MPV 10.6 7.5 - 11.1 FL Myelocyte Percent 1 (H) 0.0 %    Bands Relative 14 (H) 5 - 11 %    Segs Relative 75.0 (H) 36 - 66 %    Lymphocytes % 4.0 (L) 24 - 44 %    Monocytes % 6.0 (H) 0 - 4 %    Myelocytes Absolute 0.38 K/CU MM    Bands Absolute 5.33 K/CU MM    Segs Absolute 28.6 K/CU MM    Lymphocytes Absolute 1.5 K/CU MM    Monocytes Absolute 2.3 K/CU MM    Differential Type MANUAL DIFFERENTIAL     RBC Morphology RARE     Dohle Bodies PRESENT    Comprehensive Metabolic Panel    Collection Time: 01/11/23  3:50 AM   Result Value Ref Range    Sodium 140 135 - 145 MMOL/L    Potassium 3.2 (L) 3.5 - 5.1 MMOL/L    Chloride 108 99 - 110 mMol/L    CO2 11 (L) 21 - 32 MMOL/L    BUN 5 (L) 6 - 23 MG/DL    Creatinine 0.5 (L) 0.6 - 1.1 MG/DL    Est, Glom Filt Rate >60 >60 mL/min/1.73m2    Glucose 143 (H) 70 - 99 MG/DL    Calcium 8.7 8.3 - 10.6 MG/DL    Albumin 3.0 (L) 3.4 - 5.0 GM/DL    Total Protein 4.8 (L) 6.4 - 8.2 GM/DL    Total Bilirubin 0.4 0.0 - 1.0 MG/DL    ALT 37 10 - 40 U/L    AST 44 (H) 15 - 37 IU/L    Alkaline Phosphatase 73 40 - 128 IU/L    Anion Gap 21 (H) 4 - 16   Lipase    Collection Time: 01/11/23  3:50 AM   Result Value Ref Range    Lipase 46 13 - 60 IU/L   Vancomycin Level, Random    Collection Time: 01/11/23  3:50 AM   Result Value Ref Range    Vancomycin Rm 8.8 UG/ML    DOSE AMOUNT DOSE AMT.  GIVEN - random     DOSE TIME DOSE TIME GIVEN - random    Troponin    Collection Time: 01/11/23  3:50 AM   Result Value Ref Range    Troponin T <0.010 <0.01 NG/ML   Lactate Dehydrogenase    Collection Time: 01/11/23  3:50 AM   Result Value Ref Range     (H) 120 - 246 IU/L   Magnesium    Collection Time: 01/11/23  3:50 AM   Result Value Ref Range    Magnesium 1.6 (L) 1.8 - 2.4 mg/dl   Procalcitonin    Collection Time: 01/11/23  3:50 AM   Result Value Ref Range    Procalcitonin 0.132    Lactic Acid    Collection Time: 01/11/23  4:00 AM   Result Value Ref Range    Lactate 1.4 0.5 - 1.9 mMOL/L   Blood Gas, Venous    Collection Time: 01/11/23 4:00 AM   Result Value Ref Range    pH, Alonso 7.34 7.32 - 7.42    pCO2, Alonso 21 (L) 38 - 52 mmHG    pO2, Alonso 200 (H) 28 - 48 mmHG    Base Excess 12 (H) 0 - 2.4    HCO3, Venous 11.3 (L) 19 - 25 MMOL/L    O2 Sat, Alonso 95.5 (H) 50 - 70 %    Comment VBG    Protime/INR & PTT    Collection Time: 01/11/23  6:00 AM   Result Value Ref Range    Protime 13.1 11.7 - 14.5 SECONDS    INR 1.02 INDEX    aPTT 32.6 25.1 - 37.1 SECONDS   Hemoglobin and Hematocrit    Collection Time: 01/11/23  8:23 AM   Result Value Ref Range    Hemoglobin 9.0 (L) 12.5 - 16.0 GM/DL    Hematocrit 28.7 (L) 37 - 47 %   Respiratory Panel, Molecular, with COVID-19 (Restricted: peds pts or suitable admitted adults)    Collection Time: 01/11/23  8:54 AM    Specimen: Nasopharyngeal   Result Value Ref Range    Adenovirus Detection by PCR NOT DETECTED NOT DETECTED    Coronavirus 229E PCR NOT DETECTED NOT DETECTED    Coronavirus HKU1 PCR NOT DETECTED NOT DETECTED    Coronavirus NL63 PCR NOT DETECTED NOT DETECTED    Coronavirus OC43 PCR NOT DETECTED NOT DETECTED    SARS-CoV-2 DETECTED BY PCR (A) NOT DETECTED    Human Metapneumovirus PCR NOT DETECTED NOT DETECTED    Rhinovirus Enterovirus PCR NOT DETECTED NOT DETECTED    Influenza A by PCR NOT DETECTED NOT DETECTED    Influenza A H1 Pandemic PCR NOT DETECTED NOT DETECTED    Influenza A H1 (2009) PCR NOT DETECTED NOT DETECTED    Influenza A H3 PCR NOT DETECTED NOT DETECTED    Influenza B by PCR NOT DETECTED NOT DETECTED    Parainfluenza 1 PCR NOT DETECTED NOT DETECTED    Parainfluenza 2 PCR NOT DETECTED NOT DETECTED    Parainfluenza 3 PCR NOT DETECTED NOT DETECTED    Parainfluenza 4 PCR NOT DETECTED NOT DETECTED    RSV PCR NOT DETECTED NOT DETECTED    Bordetella parapertussis by PCR NOT DETECTED NOT DETECTED    B Pertussis by PCR NOT DETECTED NOT DETECTED    Chlamydophila Pneumonia PCR NOT DETECTED NOT DETECTED    Mycoplasma pneumo by PCR NOT DETECTED NOT DETECTED   Hemoglobin and Hematocrit    Collection Time: 01/11/23 2:01 PM   Result Value Ref Range    Hemoglobin 8.8 (L) 12.5 - 16.0 GM/DL    Hematocrit 27.3 (L) 37 - 47 %   Potassium    Collection Time: 01/11/23  2:01 PM   Result Value Ref Range    Potassium 4.1 3.5 - 5.1 MMOL/L   Magnesium    Collection Time: 01/11/23  2:01 PM   Result Value Ref Range    Magnesium 2.2 1.8 - 2.4 mg/dl        Imaging/Diagnostics Last 24 Hours   CT ABDOMEN PELVIS WO CONTRAST Additional Contrast? None    Result Date: 1/10/2023  EXAMINATION: CT OF THE ABDOMEN AND PELVIS WITHOUT CONTRAST 1/10/2023 1:42 pm TECHNIQUE: CT of the abdomen and pelvis was performed without the administration of intravenous contrast. Multiplanar reformatted images are provided for review. Automated exposure control, iterative reconstruction, and/or weight based adjustment of the mA/kV was utilized to reduce the radiation dose to as low as reasonably achievable. COMPARISON: None. HISTORY: ORDERING SYSTEM PROVIDED HISTORY: abdominal pain, hematemesis TECHNOLOGIST PROVIDED HISTORY: Reason for exam:->abdominal pain, hematemesis Additional Contrast?->None Decision Support Exception - unselect if not a suspected or confirmed emergency medical condition->Emergency Medical Condition (MA) Reason for Exam: abdominal pain, hematemesis FINDINGS: Lower Chest: There is a partially calcified soft tissue mass noted in the GE junction. It measures 10 x 6.6 x 9 cm. The appearance suggests a leiomyosarcoma or GIST. There are moderate bilateral pleural effusions with compressive atelectasis in the lung bases. Patient is status post sternotomy. Pacer leads are in good position. Organs: The the liver, pancreas appear unremarkable. There are calcified granulomas in the the spleen. The patient is status post cholecystectomy. The adrenal glands and kidneys are unremarkable. GI/Bowel: Small bowel loops appear unremarkable. There is impaction of the rectum with dense barium. Pelvis: There is a Catalan catheter in the bladder.   The patient is status post hysterectomy. Peritoneum/Retroperitoneum: Unremarkable Bones/Soft Tissues: Osteoarthritic changes are noted in lower lumbar facet joints. Large partially calcified mass noted in the region of the GE junction. It measures 10 x 6.6 x 9 cm. This may represent a leiomyosarcoma or GIST. Moderate bilateral pleural effusion with compressive atelectasis in lung bases. Dense barium impaction of the rectum. Patient is status post cholecystectomy and hysterectomy. Calcified granulomas noted in the spleen. Catalan catheter in the bladder. XR ABDOMEN (KUB) (SINGLE AP VIEW)    Result Date: 1/11/2023  EXAMINATION: ONE SUPINE XRAY VIEW(S) OF THE ABDOMEN 1/11/2023 5:44 am COMPARISON: CT abdomen pelvis dated 01/10/2023. HISTORY: ORDERING SYSTEM PROVIDED HISTORY: ng tube insertion TECHNOLOGIST PROVIDED HISTORY: Reason for exam:->ng tube insertion Reason for Exam: ng tube insertion FINDINGS: Evaluation is partially limited due to multiple lines overlying the lower chest and upper abdomen, presumably outside of the patient. Enteric tube is seen with the distal portions of the tube coiled within the lower chest/upper abdomen coursing superiorly with the tip located overlying the right perihilar region. Repositioning is recommended. Partially visualized are leads of a cardiac conduction device and changes of prior open heart surgery. Nonspecific nonobstructive bowel gas pattern is seen within the visualized upper abdomen. There is airspace opacity of the bilateral mid to lower lung field. Distal pole is coiled within the lower chest and upper abdomen coursing superiorly with the tip overlying the right perihilar region. Recommend repositioning. Airspace opacity of the bilateral mid to lower lung field. The findings were sent to the Radiology Results [id number] at 7:55 am on 1/11/2023 to be communicated to a licensed caregiver.      XR CHEST PORTABLE    Result Date: 1/11/2023  EXAMINATION: ONE XRAY VIEW OF THE CHEST 1/11/2023 2:45 am COMPARISON: January 11, 2023. HISTORY: ORDERING SYSTEM PROVIDED HISTORY: chest tube TECHNOLOGIST PROVIDED HISTORY: Reason for exam:->chest tube Reason for Exam: chest tube FINDINGS: Right chest tube is now present. Nasogastric tube again identified. Left chest wall pacer in place. Cardiomediastinal silhouette is enlarged. There is interval decrease in size of a now small right pneumothorax. Persistent basilar opacities and trace effusions are seen. No acute osseous abnormality. 1. New right chest tube with interval decrease in size of a now small right pneumothorax. 2. Stable basilar opacities and trace effusions. XR CHEST PORTABLE    Result Date: 1/10/2023  EXAMINATION: ONE XRAY VIEW OF THE CHEST 1/10/2023 11:50 am COMPARISON: None. HISTORY: ORDERING SYSTEM PROVIDED HISTORY: Chest pain TECHNOLOGIST PROVIDED HISTORY: Reason for exam:->Chest pain Reason for Exam: chest pain FINDINGS: There are postsurgical changes of median sternotomy. The heart size is enlarged. The pulmonary vasculature is within normal limits. There is increased density involving the lower lung zones bilaterally with blunting of the costophrenic angles. There is a left subclavian AICD. 1. Cardiomegaly without overt failure. 2. Small bilateral pleural effusions with lower lobe airspace disease. I would recommend follow-up to resolution. XR CHEST 1 VIEW    Result Date: 1/11/2023  EXAMINATION: ONE XRAY VIEW OF THE CHEST 1/11/2023 2:03 am COMPARISON: January 10, 2023. HISTORY: ORDERING SYSTEM PROVIDED HISTORY: got BL thoracentesis TECHNOLOGIST PROVIDED HISTORY: Reason for exam:->got BL thoracentesis Reason for Exam: got BL thoracentesis FINDINGS: Nasogastric tube appears to be within a hiatal hernia. Left chest wall pacer in place. Cardiomediastinal silhouette is enlarged. The lungs show interval appearance of a large right pneumothorax. No evidence of tension.   No discrete left pneumothorax is identified. Basilar airspace opacities are noted with trace effusions. No new osseous abnormality. Status post sternotomy. 1. Nasogastric tube is coiled within the hiatal hernia. 2. New large right pneumothorax. No evidence of tension. 3. Persistent basilar opacities with trace effusions. Patient is status post bilateral thoracentesis. 4. Cardiomegaly. The findings were sent to the Radiology Results [id number] at 2:37 am on 1/11/2023 to be communicated to a licensed caregiver. VL DUP UPPER EXTREMITY VENOUS BILATERAL    Result Date: 1/11/2023  EXAMINATION: DUPLEX ULTRASOUND OF THE BILATERAL UPPER EXTREMITIES FOR DVT, 1/11/2023 12:02 pm TECHNIQUE: Duplex ultrasound using B-mode/gray scaled imaging and Doppler spectral analysis and color flow was obtained of the deep venous structures of the bilateral upper extremities. COMPARISON: None. HISTORY: ORDERING SYSTEM PROVIDED HISTORY: edema TECHNOLOGIST PROVIDED HISTORY: Reason for exam:->edema FINDINGS: STUDY LIMITED BY EDEMA AND BANDAGES ON THE UPPER EXTREMITIES Visualized right and left upper extremity veins demonstrate unremarkable flow and compressibility. There is no evidence of echogenic thrombus. The veins demonstrate good compressibility with normal color flow study and spectral analysis. Study limited by presence of upper extremity edema and dressings however, visualized upper extremity veins demonstrate no sonographic/Doppler evidence of DVT. XR ABDOMEN FOR NG/OG/NE TUBE PLACEMENT    Result Date: 1/11/2023  EXAMINATION: ONE SUPINE XRAY VIEW(S) OF THE ABDOMEN 1/11/2023 8:40 am COMPARISON: 01/11/2023 at 0552 hours HISTORY: ORDERING SYSTEM PROVIDED HISTORY: NG pulled back 5 TECHNOLOGIST PROVIDED HISTORY: Reason for exam:->NG pulled back 5 Portable? ->Yes Reason for Exam: NG pulled back 5 FINDINGS: Nasogastric tube tip and side port curled within decompressed stomach and projects in satisfactory position.   No other significant portable radiographic change noted. Persistent bilateral mid-basilar consolidating infiltrates, cardiomegaly, postoperative changes again seen. No detectable pneumothorax or large pleural effusion. Nasogastric tube tip and side port projecting within decompressed stomach in satisfactory position. No other significant interval change compared to prior study.        Electronically signed by Hallie Shelley MD on 1/11/2023 at 8:41 PM

## 2023-01-13 NOTE — PROGRESS NOTES
CARDIOLOGY  NOTE        Name:  Mk Bush /Age/Sex: 1950  (67 y.o. female)   MRN & CSN:  [id number] & [id number] Admission Date/Time: 1/10/2023 11:34 AM   Location:  -[facility] PCP: Chavez Fontanez Day: 4        PLAN FROM CARDIOLOGY FOR TODAY:   Supportive care      - cardiology consult is for: A. fib with RVR    -  Interval history: Patient coded last night and is on ventilator now still remains in A. fib    ASSESSMENT/ PLAN:      Patient is in atrial fibrillation with rapid ventricular response etiology unclear we will continue with negative chronotropic agent and anticoagulation  -Patient has leukocytosis possibly of sepsis h  Echocardiogram shows normal ejection fraction  Has severe pulmonary hypertension  -BNP level is elevated suggestive of volume overload          Subjective: Todays complain: Patient on vent  HPI:  Igor Miller is a 67 y. o.year old who and presents with had concerns including Hematemesis (2 x coffee ground) and Chest Pain. Chief Complaint   Patient presents with    Hematemesis     2 x coffee ground    Chest Pain           Objective: Temperature:  Current - Temp: 99.5 °F (37.5 °C);  Max - Temp  Av.8 °F (37.1 °C)  Min: 98.2 °F (36.8 °C)  Max: 99.5 °F (37.5 °C)    Respiratory Rate : Resp  Av.1  Min: 18  Max: 34    Pulse Range: Pulse  Av.6  Min: 84  Max: 128    Blood Presuure Range:  Systolic (89QCH), PHW:53 , Min:74 , ZJK:423   ; Diastolic (45HNG), MUW:87, Min:52, Max:96      Pulse ox Range: SpO2  Av.1 %  Min: 88 %  Max: 100 %    24hr I & O:    Intake/Output Summary (Last 24 hours) at 2023 0926  Last data filed at 2023 0533  Gross per 24 hour   Intake 2080 ml   Output 791 ml   Net 1289 ml         BP (!) 84/59   Pulse (!) 117   Temp 99.5 °F (37.5 °C) (Rectal)   Resp (!) 34   Ht 5' 4.02\" (1.626 m)   Wt 124 lb 12.5 oz (56.6 kg)   SpO2 90%   BMI 21.41 kg/m²           Review of Systems: On vent  TELEMETRY: Atrial fibrillation    has a past medical history of CAD (coronary artery disease), Cataract, Dysphagia, Epiglottiditis, GERD (gastroesophageal reflux disease), GERD (gastroesophageal reflux disease), Hematemesis, History of GI bleed, HLD (hyperlipidemia), Iron deficiency, Mitral regurgitation, Non-rheumatic mitral valve disease, OA (osteoarthritis), PAF (paroxysmal atrial fibrillation) ([facility] [address] Thyroid disease, and VRE (vancomycin resistant enterococcus) culture positive. has a past surgical history that includes Esophagogastroduodenoscopy (01/06/2023); Coronary artery bypass graft; Appendectomy; Breast biopsy (Left, 04/04/2017); Cardiac catheterization; Cardiac valve replacement (2012); Mitral valve repair; Cataract removal (Right, 08/15/2018); Cholecystectomy; Colonoscopy (2013); Coronary artery bypass graft (06/06/2012); Gastric bypass surgery; Hysterectomy; joint replacement (Bilateral); Knee arthroscopy (Right, 2014); Esophagogastroduodenoscopy (07/12/2022); Tonsillectomy; Tubal ligation; and Upper gastrointestinal endoscopy. Physical Exam:  General:  on vent  Head:normal  Eye: Pupils equal and round  Neck:  No JVD, no carotid bruit noted   Chest:  Clear to auscultation, no signs of respiratory distress  Cardiovascular:  Normal rate and rhythm. S1 and S2 noted.  No murmurs rubs or gallops  Abdomen:   nontender  Extremities:  tr edema  Pulses; palpable  Neuro: on vent  Medications:    vasopressin        meropenem  1,000 mg IntraVENous Q12H    chlorhexidine  15 mL Mouth/Throat BID    dexamethasone  20 mg IntraVENous Daily    inhalation builder   Inhalation Q4H    vancomycin  1,250 mg IntraVENous Once    [START ON 1/14/2023] vancomycin  750 mg IntraVENous Q18H    [Held by provider] metoprolol  5 mg IntraVENous Q6H    naloxone        norepinephrine-sodium chloride        propofol        pantoprazole  40 mg IntraVENous BID    [Held by provider] amiodarone  400 mg Oral Nightly [Held by provider] atorvastatin  20 mg Oral Nightly    [Held by provider] ferrous sulfate  325 mg Oral Daily with breakfast    gabapentin  200 mg Oral Nightly    levothyroxine  75 mcg Oral Daily      propofol Stopped (01/13/23 1700)    EPINEPHrine 20 mcg/min (01/13/23 0854)    vasopressin 0.04 Units/min (01/13/23 0525)    norepinephrine 40 mcg/min (01/13/23 0524)    sodium bicarbonate infusion 200 mL/hr at 01/13/23 0943     fentanNYL, potassium chloride **OR** potassium chloride, sodium phosphate IVPB **OR** sodium phosphate IVPB **OR** sodium phosphate IVPB, ketorolac, morphine, prochlorperazine, albuterol sulfate HFA, acetaminophen **OR** acetaminophen    Lab Data:  CBC:   Recent Labs     01/11/23  0350 01/11/23  0823 01/12/23  0500 01/12/23  2335 01/13/23  0024 01/13/23  0041 01/13/23  0430   WBC 38.1*  --  21.6*  --   --   --  11.2*   HGB 9.4*   < > 8.6*   < > 8.9* 8.0* 8.6*   HCT 30.6*   < > 27.2*   < > 26.0* 24.0* 29.0*   MCV 97.5  --  94.4  --   --   --  99.3     --  281  --   --   --  285    < > = values in this interval not displayed. BMP:   Recent Labs     01/11/23  0350 01/11/23  1401 01/12/23  0500 01/12/23  1728 01/13/23  0024 01/13/23  0041 01/13/23  0430     --  143   < > 145 149* 146*   K 3.2*   < > 3.3*   < > 4.0 3.6 4.1     --  107  --   --   --  111*   CO2 11*  --  21  --  17* 19* 11*   PHOS  --   --   --   --   --   --  3.5   BUN 5*  --  8  --   --   --  13   CREATININE 0.5*  --  0.5*  --  1.0 1.1 0.9    < > = values in this interval not displayed.      LIVER PROFILE:   Recent Labs     01/10/23  1144 01/11/23  0350 01/12/23  0500 01/13/23  0430   AST 71* 44* 26 197*   ALT 38 37 29 81*   LIPASE 26 46 24  --    BILIDIR  --   --   --  0.4*   BILITOT 0.5 0.4 0.4 0.6   ALKPHOS 84 73 72 103     PT/INR:   Recent Labs     01/10/23  1144 01/11/23  0600 01/12/23  0500   PROTIME 12.0 13.1 12.0   INR 0.93 1.02 0.93     APTT:   Recent Labs     01/10/23  1144 01/11/23  0600 01/12/23  0500 APTT 29.9 32.6 25.2     BNP:  No results for input(s): BNP in the last 72 hours. TROPONIN: No results for input(s): TROPONINI in the last 72 hours. Recent Labs     01/10/23  1144 01/10/23  2025 01/11/23  0350   TROPONINT 0.019* 0.013* <0.010        Labs, consult, tests reviewed                    Yusra Bustillos MD, PA-C 1/13/2023 9:45 AM     Please note this report has been partially produced using speech recognition software and may contain errors related to that system including errors in grammar, punctuation, and spelling, as well as words and phrases that may be inappropriate. If there are any questions or concerns please feel free to contact the dictating provider for clarification.

## 2023-01-13 NOTE — PROGRESS NOTES
Patient anxious, upper airway sounds very congested, Nasotrach suctioned, stimulating cough reflex, with large amts of yellow sputum removed, clearing airway significantly,sats dropped to 84, Fio2 increased per vapotherm with sats returning to 89/90, RT called to come evaluate patient, AF/ V rate 120s, Intensivest called to come see patient

## 2023-01-13 NOTE — PROGRESS NOTES
V2.0  [facility] Hospitalist Progress Note      Name:  Марина Claros /Age/Sex: 1950  (67 y.o. female)   MRN & CSN:  [id number] & [id number] Encounter Date/Time: 2023 8:41 PM EST    Location:  -A PCP: [facility] Day: 3    Assessment and Plan:   Марина Claros is a 67 y.o. 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    Bilateral pleural effusions with chronic systolic/diastolic heart failure  -Echo this admit with severe tricuspid regurgitation, RVSP 57 mmHg  -EF 35% per echo 2022    S/p thoracentesis 2022  -Follow-up final labs from thoracentesis    Postprocedure right pneumothorax status post chest tube 2022   -Airleak is absent    Aspiration pneumonia is suspected  -IV Zosyn  until     COVID positive  -Dexamethasone started  but later stopped as more likely she has aspiration pneumonia  -Is out of isolation.     Initially sepsis noted upon admission-felt to be due to pneumonia    Metabolic acidosis noted on admission  -Monitor    Chest pain with elevated troponin  -Troponin went from 0.019-0.013 to less than 0.01-likely due to demand ischemia    Constipation with impacted stool noted upon admission  -Bowel regimen    Chronic pain, opiate dependence    Urine culture sent 1/10 with Klebsiella pneumonia ESBL 100,000 CFU per mL, and Enterococcus faecalis 50,000 CFU per mL  - evening-we will give 1 dose of meropenem, critical care will be back tomorrow in the morning    S/p A-line 2022    Hypertension  Hyperlipidemia  BMI 18.9  Hypothyroidism  B12 deficiency    Please see H&P for more details    Diet Diet NPO   DVT Prophylaxis [] Lovenox, []  Heparin, [] SCDs, [] Ambulation,  [] Eliquis, [] Xarelto  [] Coumadin   Code Status Full Code   Disposition From: [facility]  Expected Disposition: To be determined  Estimated Date of Discharge: When above issues improve  Patient requires continued admission due to Vapotherm FiO2 85%, and multiple other issues   Surrogate Decision Maker/ POA  Governor Chaim is listed as the alternate contact in the chart     Subjective:     Chief Complaint: Hematemesis (2 x coffee ground) and Chest Pain       Terrazas Breath is a 67 y.o. female who presents with hematemesis            January 12  -RN reported patient was regurgitation all attempted p.o. intake. SLP to see her tomorrow.  -Bariatric surgery consult from today approved. .  It is felt unlikely that she has a GEJ tumor (CT scan on admission showed suggestion of a tumor, but EGD done 6 days earlier did not show a tumor as per report). -Per bariatric surgery patient is already seeing surgical oncology at [facility]  -She was supine in bed when I saw her  -Heart rate 10 6-1 15 when I saw her-she was not requiring any drips for that. Blood pressure was 106/72. January 11    She was on Vapotherm 90% when I saw her  She has a right chest tube  She was tachycardic on the monitor-later fluids were started  She was supine in bed  Her  and son were at the bedside         Review of Systems:    Review of Systems    No headache or hematuria reported    Objective: Intake/Output Summary (Last 24 hours) at 1/12/2023 2026  Last data filed at 1/12/2023 1737  Gross per 24 hour   Intake --   Output 1685 ml   Net -1685 ml          Vitals:   Vitals:    01/12/23 1830   BP: 106/72   Pulse: 100   Resp: 30   Temp:    SpO2: 95%       Physical Exam:     Physical Exam  Constitutional:       Appearance: She is ill-appearing. Pulmonary:      Effort: Pulmonary effort is normal.   Neurological:      Cranial Nerves: No cranial nerve deficit.       Right-sided chest tube noted    Medications: Medications:    metoprolol  5 mg IntraVENous Q6H    piperacillin-tazobactam  3,375 mg IntraVENous Q8H    pantoprazole  40 mg IntraVENous BID    [Held by provider] amiodarone  400 mg Oral Nightly    atorvastatin  20 mg Oral Nightly    [Held by provider] ferrous sulfate  325 mg Oral Daily with breakfast    gabapentin  200 mg Oral Nightly    levothyroxine  75 mcg Oral Daily      Infusions:       PRN Meds: potassium chloride, 20 mEq, PRN   Or  potassium chloride, 10 mEq, PRN  sodium phosphate IVPB, 10 mmol, PRN   Or  sodium phosphate IVPB, 15 mmol, PRN   Or  sodium phosphate IVPB, 20 mmol, PRN  ketorolac, 15 mg, Q6H PRN  morphine, 2 mg, Q4H PRN  prochlorperazine, 10 mg, Q6H PRN  albuterol sulfate HFA, 2 puff, Q6H PRN  acetaminophen, 650 mg, Q6H PRN   Or  acetaminophen, 650 mg, Q6H PRN      Labs      Recent Results (from the past 24 hour(s))   EKG 12 Lead    Collection Time: 01/12/23 12:15 AM   Result Value Ref Range    Ventricular Rate 131 BPM    Atrial Rate 300 BPM    QRS Duration 92 ms    Q-T Interval 334 ms    QTc Calculation (Bazett) 493 ms    R Axis 215 degrees    T Axis 202 degrees    Diagnosis       Atrial flutter with variable AV block  Right superior axis deviation  Low voltage QRS  Cannot rule out Anterior infarct (cited on or before 10-DIXIE-2023)  Abnormal ECG  When compared with ECG of 10-DIXIE-2023 11:31,  Atrial flutter has replaced Atrial fibrillation  Nonspecific T wave abnormality has replaced inverted T waves in Inferior leads  Confirmed by [facility] JEFFREY CHENG ([id number]) on 1/12/2023 1:23:31 PM     Vancomycin Level, Random    Collection Time: 01/12/23  5:00 AM   Result Value Ref Range    Vancomycin Rm 9.5 UG/ML    DOSE AMOUNT DOSE AMT.  GIVEN - 750MG     DOSE TIME DOSE TIME GIVEN - 0600    CBC with Auto Differential    Collection Time: 01/12/23  5:00 AM   Result Value Ref Range    WBC 21.6 (H) 4.0 - 10.5 K/CU MM    RBC 2.88 (L) 4.2 - 5.4 M/CU MM    Hemoglobin 8.6 (L) 12.5 - 16.0 GM/DL    Hematocrit 27.2 (L) 37 - 47 %    MCV 94.4 78 - 100 FL    MCH 29.9 27 - 31 PG    MCHC 31.6 (L) 32.0 - 36.0 %    RDW 17.9 (H) 11.7 - 14.9 %    Platelets 109 625 - 927 K/CU MM    MPV 11.0 7.5 - 11.1 FL    Differential Type AUTOMATED DIFFERENTIAL     Segs Relative 93.5 (H) 36 - 66 %    Lymphocytes % 2.0 (L) 24 - 44 %    Monocytes % 3.0 0 - 4 %    Eosinophils % 0.0 0 - 3 %    Basophils % 0.1 0 - 1 %    Segs Absolute 20.2 K/CU MM    Lymphocytes Absolute 0.4 K/CU MM    Monocytes Absolute 0.6 K/CU MM    Eosinophils Absolute 0.0 K/CU MM    Basophils Absolute 0.0 K/CU MM    Nucleated RBC % 0.1 %    Total Nucleated RBC 0.0 K/CU MM    Total Immature Neutrophil 0.30 K/CU MM    Immature Neutrophil % 1.4 (H) 0 - 0.43 %   Amylase    Collection Time: 01/12/23  5:00 AM   Result Value Ref Range    Amylase 56 25 - 115 U/L   Comprehensive Metabolic Panel    Collection Time: 01/12/23  5:00 AM   Result Value Ref Range    Sodium 143 135 - 145 MMOL/L    Potassium 3.3 (L) 3.5 - 5.1 MMOL/L    Chloride 107 99 - 110 mMol/L    CO2 21 21 - 32 MMOL/L    BUN 8 6 - 23 MG/DL    Creatinine 0.5 (L) 0.6 - 1.1 MG/DL    Est, Glom Filt Rate >60 >60 mL/min/1.73m2    Glucose 119 (H) 70 - 99 MG/DL    Calcium 8.9 8.3 - 10.6 MG/DL    Albumin 2.5 (L) 3.4 - 5.0 GM/DL    Total Protein 4.6 (L) 6.4 - 8.2 GM/DL    Total Bilirubin 0.4 0.0 - 1.0 MG/DL    ALT 29 10 - 40 U/L    AST 26 15 - 37 IU/L    Alkaline Phosphatase 72 40 - 128 IU/L    Anion Gap 15 4 - 16   Lipase    Collection Time: 01/12/23  5:00 AM   Result Value Ref Range    Lipase 24 13 - 60 IU/L   Protime/INR & PTT    Collection Time: 01/12/23  5:00 AM   Result Value Ref Range    Protime 12.0 11.7 - 14.5 SECONDS    INR 0.93 INDEX    aPTT 25.2 25.1 - 37.1 SECONDS   Blood Gas, Venous    Collection Time: 01/12/23  5:00 AM   Result Value Ref Range    pH, Alonso 7.38 7.32 - 7.42    pCO2, Alonso 36 (L) 38 - 52 mmHG    pO2, Alonso 79 (H) 28 - 48 mmHG    Base Excess 3 (H) 0 - 2.4    HCO3, Venous 21.3 19 - 25 MMOL/L    O2 Sat, Alonso 93.4 (H) 50 - 70 %    Comment VBG    POCT Glucose    Collection Time: 01/12/23  1:40 PM   Result Value Ref Range    POC Glucose 130 (H) 70 - 99 MG/DL   SPECIMEN REJECTION    Collection Time: 01/12/23  3:59 PM   Result Value Ref Range    Rejected Test K     Reason for Rejection UNABLE TO PERFORM TESTING:    POCT Glucose    Collection Time: 01/12/23  4:57 PM   Result Value Ref Range    POC Glucose 119 (H) 70 - 99 MG/DL   Potassium    Collection Time: 01/12/23  5:28 PM   Result Value Ref Range    Potassium 4.2 3.5 - 5.1 MMOL/L        Imaging/Diagnostics Last 24 Hours   CT ABDOMEN PELVIS WO CONTRAST Additional Contrast? None    Result Date: 1/10/2023  EXAMINATION: CT OF THE ABDOMEN AND PELVIS WITHOUT CONTRAST 1/10/2023 1:42 pm TECHNIQUE: CT of the abdomen and pelvis was performed without the administration of intravenous contrast. Multiplanar reformatted images are provided for review. Automated exposure control, iterative reconstruction, and/or weight based adjustment of the mA/kV was utilized to reduce the radiation dose to as low as reasonably achievable. COMPARISON: None. HISTORY: ORDERING SYSTEM PROVIDED HISTORY: abdominal pain, hematemesis TECHNOLOGIST PROVIDED HISTORY: Reason for exam:->abdominal pain, hematemesis Additional Contrast?->None Decision Support Exception - unselect if not a suspected or confirmed emergency medical condition->Emergency Medical Condition (MA) Reason for Exam: abdominal pain, hematemesis FINDINGS: Lower Chest: There is a partially calcified soft tissue mass noted in the GE junction. It measures 10 x 6.6 x 9 cm. The appearance suggests a leiomyosarcoma or GIST. There are moderate bilateral pleural effusions with compressive atelectasis in the lung bases. Patient is status post sternotomy. Pacer leads are in good position. Organs: The the liver, pancreas appear unremarkable. There are calcified granulomas in the the spleen. The patient is status post cholecystectomy.  The adrenal glands and kidneys are unremarkable. GI/Bowel: Small bowel loops appear unremarkable. There is impaction of the rectum with dense barium. Pelvis: There is a Catalan catheter in the bladder. The patient is status post hysterectomy. Peritoneum/Retroperitoneum: Unremarkable Bones/Soft Tissues: Osteoarthritic changes are noted in lower lumbar facet joints. Large partially calcified mass noted in the region of the GE junction. It measures 10 x 6.6 x 9 cm. This may represent a leiomyosarcoma or GIST. Moderate bilateral pleural effusion with compressive atelectasis in lung bases. Dense barium impaction of the rectum. Patient is status post cholecystectomy and hysterectomy. Calcified granulomas noted in the spleen. Catalan catheter in the bladder. XR ABDOMEN (KUB) (SINGLE AP VIEW)    Result Date: 1/11/2023  EXAMINATION: ONE SUPINE XRAY VIEW(S) OF THE ABDOMEN 1/11/2023 5:44 am COMPARISON: CT abdomen pelvis dated 01/10/2023. HISTORY: ORDERING SYSTEM PROVIDED HISTORY: ng tube insertion TECHNOLOGIST PROVIDED HISTORY: Reason for exam:->ng tube insertion Reason for Exam: ng tube insertion FINDINGS: Evaluation is partially limited due to multiple lines overlying the lower chest and upper abdomen, presumably outside of the patient. Enteric tube is seen with the distal portions of the tube coiled within the lower chest/upper abdomen coursing superiorly with the tip located overlying the right perihilar region. Repositioning is recommended. Partially visualized are leads of a cardiac conduction device and changes of prior open heart surgery. Nonspecific nonobstructive bowel gas pattern is seen within the visualized upper abdomen. There is airspace opacity of the bilateral mid to lower lung field. Distal pole is coiled within the lower chest and upper abdomen coursing superiorly with the tip overlying the right perihilar region. Recommend repositioning. Airspace opacity of the bilateral mid to lower lung field.  The findings were sent to the Radiology Results [id number] at 7:55 am on 1/11/2023 to be communicated to a licensed caregiver. XR CHEST PORTABLE    Result Date: 1/11/2023  EXAMINATION: ONE XRAY VIEW OF THE CHEST 1/11/2023 2:45 am COMPARISON: January 11, 2023. HISTORY: ORDERING SYSTEM PROVIDED HISTORY: chest tube TECHNOLOGIST PROVIDED HISTORY: Reason for exam:->chest tube Reason for Exam: chest tube FINDINGS: Right chest tube is now present. Nasogastric tube again identified. Left chest wall pacer in place. Cardiomediastinal silhouette is enlarged. There is interval decrease in size of a now small right pneumothorax. Persistent basilar opacities and trace effusions are seen. No acute osseous abnormality. 1. New right chest tube with interval decrease in size of a now small right pneumothorax. 2. Stable basilar opacities and trace effusions. XR CHEST PORTABLE    Result Date: 1/10/2023  EXAMINATION: ONE XRAY VIEW OF THE CHEST 1/10/2023 11:50 am COMPARISON: None. HISTORY: ORDERING SYSTEM PROVIDED HISTORY: Chest pain TECHNOLOGIST PROVIDED HISTORY: Reason for exam:->Chest pain Reason for Exam: chest pain FINDINGS: There are postsurgical changes of median sternotomy. The heart size is enlarged. The pulmonary vasculature is within normal limits. There is increased density involving the lower lung zones bilaterally with blunting of the costophrenic angles. There is a left subclavian AICD. 1. Cardiomegaly without overt failure. 2. Small bilateral pleural effusions with lower lobe airspace disease. I would recommend follow-up to resolution. XR CHEST 1 VIEW    Result Date: 1/11/2023  EXAMINATION: ONE XRAY VIEW OF THE CHEST 1/11/2023 2:03 am COMPARISON: January 10, 2023. HISTORY: ORDERING SYSTEM PROVIDED HISTORY: got BL thoracentesis TECHNOLOGIST PROVIDED HISTORY: Reason for exam:->got BL thoracentesis Reason for Exam: got BL thoracentesis FINDINGS: Nasogastric tube appears to be within a hiatal hernia.   Left chest wall pacer in place. Cardiomediastinal silhouette is enlarged. The lungs show interval appearance of a large right pneumothorax. No evidence of tension. No discrete left pneumothorax is identified. Basilar airspace opacities are noted with trace effusions. No new osseous abnormality. Status post sternotomy. 1. Nasogastric tube is coiled within the hiatal hernia. 2. New large right pneumothorax. No evidence of tension. 3. Persistent basilar opacities with trace effusions. Patient is status post bilateral thoracentesis. 4. Cardiomegaly. The findings were sent to the Radiology Results [id number] at 2:37 am on 1/11/2023 to be communicated to a licensed caregiver. VL DUP UPPER EXTREMITY VENOUS BILATERAL    Result Date: 1/11/2023  EXAMINATION: DUPLEX ULTRASOUND OF THE BILATERAL UPPER EXTREMITIES FOR DVT, 1/11/2023 12:02 pm TECHNIQUE: Duplex ultrasound using B-mode/gray scaled imaging and Doppler spectral analysis and color flow was obtained of the deep venous structures of the bilateral upper extremities. COMPARISON: None. HISTORY: ORDERING SYSTEM PROVIDED HISTORY: edema TECHNOLOGIST PROVIDED HISTORY: Reason for exam:->edema FINDINGS: STUDY LIMITED BY EDEMA AND BANDAGES ON THE UPPER EXTREMITIES Visualized right and left upper extremity veins demonstrate unremarkable flow and compressibility. There is no evidence of echogenic thrombus. The veins demonstrate good compressibility with normal color flow study and spectral analysis. Study limited by presence of upper extremity edema and dressings however, visualized upper extremity veins demonstrate no sonographic/Doppler evidence of DVT.      XR ABDOMEN FOR NG/OG/NE TUBE PLACEMENT    Result Date: 1/11/2023  EXAMINATION: ONE SUPINE XRAY VIEW(S) OF THE ABDOMEN 1/11/2023 8:40 am COMPARISON: 01/11/2023 at 0552 hours HISTORY: ORDERING SYSTEM PROVIDED HISTORY: NG pulled back 5 TECHNOLOGIST PROVIDED HISTORY: Reason for exam:->NG pulled back 5 Portable? ->Yes Reason for Exam: NG pulled back 5 FINDINGS: Nasogastric tube tip and side port curled within decompressed stomach and projects in satisfactory position. No other significant portable radiographic change noted. Persistent bilateral mid-basilar consolidating infiltrates, cardiomegaly, postoperative changes again seen. No detectable pneumothorax or large pleural effusion. Nasogastric tube tip and side port projecting within decompressed stomach in satisfactory position. No other significant interval change compared to prior study.        Electronically signed by Nargis Mayen MD on 1/12/2023 at 8:26 PM

## 2023-01-13 NOTE — CARE COORDINATION
Chart reviewed. Pt coded overnight requiring intubation 1/13. CM left second message for admissions at [address] [place] [address] requesting a return call requesting pt admission date and possible readmission at discharge. [id number] Received returned call from [address] [place] [address] admissions. Pt originally admitted to SNF in Oct 2022 and has not returned home since admission. Pt would need precert for SNF at discharge.

## 2023-01-13 NOTE — PROCEDURES
Bronchoscopy Procedure Note   Indications: Aspiration pneumonia. Hypoxia   Procedure: Bronchoscopy   Procedure Clinician: Gerald Rodas MD   Procedure Assistant: None   Anesthesia: per [facility]   Procedure Details:   Emergent procedure for hypoxia and aspiration      The patient was premedicated with propofol and fentanyl infusion. The patient was pre-oxygenated at 100% FiO2 and underwent fiberoptic bronchoscopy through the endotracheal tube. The airways were closely inspected and secretions were evacuated. The trachea is of normal caliber with normal appearing bronchial mucosa and anatomy. The nicole is sharp. The tracheobronchial tree was then examined. Upon inspection of the right mainstem, right upper lobe was normal. Inspection of the bronchus intermedius, right middle lobe and right lower lobes were normal in appearance. Upon inspection of left mainstem bronchus, left upper lobe segments, lingula and left lower lobe bronchial segments were normal. There were no lesions or secretions. . The scope was then slowly withdrawn and removed. Findings:   Scant dark brown secretions. Complications: None; patient tolerated the procedure well.

## 2023-01-13 NOTE — PROCEDURES
Central Venous Catheter Insertion Procedure Note   Procedure: Insertion of Central Venous Catheter   Procedure Clinician: Sarwat Helms MD   Procedure Assistant: None   Indications: Access, Sclerotic agents   Size and location: 7Fr TLC, Right femoral vein   Attempts: 1   Procedure Details:   emergent    Under sterile conditions the skin above the right femoral vein was prepped with chlorhexidine and covered with a sterile drape. Local anesthesia was applied to the skin and subcutaneous tissues. Ultrasound was used to localize the vein and an 18-gauge needle was then inserted into the vein. A drop test was performed and was negative for any evidence of arterial flow. A guide wire was then passed easily through the catheter. There were no arrhythmias. Ultrasound was used to identify the guidewire once in the vein. The catheter was then withdrawn. A 7Fr TLC was then inserted into the vessel over the guide wire. The guidewire was then removed with the tip intact, and witnessed by bedside nurse. The catheter was sutured into place. Findings: There were no changes to vital signs. Catheter was flushed with 20 mL NS. Patient tolerated the procedure well.

## 2023-01-13 NOTE — CODE DOCUMENTATION
Code Blue: CPR Note  At approximately 11:54, post intubation patient was noted to be more hypotensive and then quickly transitioned to PEA. Code Blue was activated at 11:54 including CPR per ACLS for PEA, as well we two rounds of IV Epinephrine and ongoing chest compressions before ROSC was achieved at 11:59 AM  Following resuscitation, hemodynamics improved  Additional BMP, Mag, Calcium, CBC, ABG with lactate ordered. Family was notified of pt's condition. See CPR-Code Blue Sheet in bedside chart until scanned into EMR for medication dosings and resuscitation team members present.

## 2023-01-13 NOTE — PROGRESS NOTES
Instructed per lab, may draw #1 set of Bl culture per newly placed CVC or Krystle, they will need to draw #2 blood cul per venepuncture, ABGs sent , Urine speciman to lab, mary victoria 110 to 120 noted, Epi gtt at 12 mics, Vaso at 0.04 units/mjn, norepi at 40 mics, Lukins trap in place to attempt collection of sputujm specimen, [employer] nurse here to see patient review chart,

## 2023-01-13 NOTE — PROGRESS NOTES
Dr. Efrain iDaz her reviewed all patient information, including low BP, and IV pressor rates at this time,

## 2023-01-13 NOTE — PROGRESS NOTES
I have personally seen and examined the patient independently. I have reviewed the patient's available data,including medical history and recent test results. I reviewed and discussed detailed documentation as provided by RONA Aburto PA-C. I agree with the physical exam findings, assessment and plan. My documented complex medical decision making constitutes a substantive portion of the supervisory note. The events of the evening 1/12/2023, reviewed in detail. Acute respiratory failure with hypoxia  Suspected aspiration pneumonia/pneumonitis  Evolving ARDS  Status post cardiopulmonary resuscitation for PEA arrest, ROSC within 5 minutes. Acute kidney injury  Chronic systolic diastolic heart failure, concurrent atrial fibrillation (chronic), PPM  Remote CABG  Bilateral pleural effusions (transudative, mixed cellularity), left pneumothorax following cardiopulmonary resuscitation 1/12/2023 (chest tube placed note airleak). 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 ESBL Klebsiella, Enterococcal UTI    Continue high level ventilatory support, FiO2 PEEP 100% 10 cm of water pressure respectively (marginal oxygenation, hypercapnia noted)  ATB coverage for possible/suspect aspiration  Hemodynamic support pressor agent as necessary, favor use of Allen-Synephrine given atrial fibrillation  Medical management Atrial fibrillation minus anticoagulation  PPI  Serial H/H  Monitor renal function, electrolytes, urine output. If aggressive care is sought given evolving oligoanuria, patient will require continuous renal replacement therapy.   GI consultation/input appreciated, as previously discussed, consult bariatric surgery for opinion regarding additional evaluation, management of GE junction lesion ([facility] contacted 1/11/2023 given serial evaluations for this issues at that facility, reportedly no beds available). Complex medical decisions reviewed in detail during critical care rounds.     Marcie Irby  [phone]

## 2023-01-13 NOTE — PROGRESS NOTES
Intensiveist at bedside,  and son called to let them know about patient resp decline, they are coming into hospital, a line placed right radial site by physican, port chest XR done, Placed on BIPAP masik 15/10 100& fio2 sats at 77, patient awake confused, EPOC labs with Abgs done, Norepi gtt started, 2 mics for BP 84/48

## 2023-01-13 NOTE — PROGRESS NOTES
01/13/23 1335   Encounter Summary   Encounter Overview/Reason  Spiritual/Emotional Needs   Service Provided For: Patient and family together   Referral/Consult From: Nurse   Support System Spouse; Children   Last Encounter  01/13/23  (Had prayer per family request)   Complexity of Encounter Low   Begin Time 1130   End Time  1145   Total Time Calculated 15 min   Encounter    Type Follow up   Spiritual/Emotional needs   Type Spiritual Support   Assessment/Intervention/Outcome   Assessment Peaceful   Intervention Prayer (assurance of)/[place];Sustaining Presence/Ministry of presence   Outcome Expressed Gratitude   Plan and Referrals   Plan/Referrals Continue to visit, (comment)

## 2023-01-13 NOTE — PROGRESS NOTES
Called to patients bedside, patient being hypoxic on high flow nasal cannula 40 L 100% with a SPO2 in the high 70s to low 80s. Evaluation at bedside showed patient being tachypneic, hypoxic, slightly more hypotensive requiring initiation of vasoactive agents with an increased work of breathing. Initial x-ray showing congestive findings with consolidation concerning for pneumonia. Patient continued to have episodes of emesis of what appeared to be dark coffee-ground-like emesis. Attempted trial of BiPAP with no improvement in her SPO2. After long discussion with the patient about her options, joint decision was made to pursue with endotracheal intubation for hypoxic respiratory failure. Preintubation patient's vital were stable on Levophed with blood pressure systolic greater than 223, heart rates in the low 100s and SPO2 in the high 70s. Given her positioning and high likelihood for aspiration, patient was intubated with head of bed at 20 degrees. Patient was successfully intubated with for first-pass attempt with grade 1 view using direct laryngoscopy. Upon visualization of the oropharynx noted to have dark coffee-ground like emesis with definite evidence of aspiration with change had contributed to her previously noted hypoxia. Post intubation patient was noted to be more hypotensive, uptitrated Levophed, pushed 500 mcg of Allen-Synephrine with minimal improvement in her blood pressure. Previously noted A-line noted to be flat,  no pulse was palpable and CPR was initiated per ACLS protocol for PEA arrest (see code note for details). ROSC was achieved, pressors were titrated post resuscitation labs were drawn by phlebotomy at bedside and sent down to lab. Stat chest x-ray showed appropriate placement of tube with noted new onset left-sided pneumothorax for which a chest tube was placed. Patient Stabilized and SpO2 improved. Patient remains on life support Levo, Vaso, epinephrine.    Had a long discussion with family who was called in given her deterioration, explained in detail her current guarded prognosis and events that have lead up to her current state. All questions answered. Problem list  Septic shock  ARDS  Hypoxic respiratory failure  GIB? Neuro: intubated, sedated, on intravenous propofol. Pain controller don appropriate intravenous regimen, titrate as needed  Cardio:  Septic shock on vasoactive agents, titrate off as tolerated to MAO > 65 mmHg. Resp: ARDS P/F 56, Hypoxic resp failure on Maximal ventilatory support for hypoxic respiratory failure. Adjust vent as tolerated to improve acid base status while preserving Lung protective strategies 6 cc/kg IBW, Plateau pressure < 30, Driving pressure < 16. dexamethasone per DEXA ARDS Trial and Nebulized milrinone 4mg q4hr as pulmonary vasodilator  GI: GIB vs necrosis. Persistently having dark coffee ground like emesis with black, swamp like consistency concerning for possible necrosis. GI and bariatric surgery consulted wanting to follow conservative management at this time. May benefit from rescoping but now to acutely ill for further scoping. NPO, Protonix BID. : severe acid base disturbance, adjuste with ventilator. Pending labs Cr was previously normal 0.5. monitor electroltyes and replenish as needed  Heme:  Bleeding source very suspiciouos given finding in NGT. Type and screen and transfuse as needed. Hold DVT ppx. At this time  ID: Septic shock, Pneumonia, aspiration. Remains on intravenous broad spectrum antibiotics, broaden to vancomycin and meropenem given acute illness. Recutlrue, Follow up on repeat cultures and narrow as patients condition improves. Endo: POC BG. -180 mg/dL. ISS as needed  MSK: No acute issues. Tubes/Lines/Drains:  ETT, NGT, CVC, A-line, Catalan.     Code Status: Full         Critical care attestation:    I spent 85 cumulative minutes (excluding procedures), in full attention to this critically ill patient. I have personally reviewed the patient's history and interval events in the EMR, along with vitals, labs and radiology imaging. Critical care time was spent personally providing care for this patient, excluding billable procedures, and no overlapping with any other provider. This includes documenting my assessment and plan of care and developing the care plan to treat the problems below. The high probability of deterioration required my full and direct attention, intervention, and personal management due to do to underlying diagnosis and clinical problems including the treatment of active or impending:  [] Neurological monitoring and treatment  [x] Respiratory failure -assessment of ventilator support, adjustment, ventilator weaning  [x] Hemodynamic and volume assessment with volume resuscitation  [x] Mechanical and/or chemical support of the circulation,  [x] Frequent vasoactive agent adjustment,  [] Renal replacement therapy,  [x] For rapid decompensation,  [x] Electrolyte instability  [] Suctioning every 2 hours  [] Every hour neuro checks  [] Every hour neurovascular checks  [x] Frequent evaluation of patient's response to treatment and titration of therapies,  [x] Interpretation of laboratory and radiological data,  [x] Application and interpretation of advanced monitoring technologies,  [x] Extensive interpretation of multiple databases,  [x] Development of treatment plan with patient, surrogate, or consultants.   [] Others:

## 2023-01-13 NOTE — PROGRESS NOTES
Ashlie PA notified of pt now responding to voice and following commands. Notified of R hand squeeze weaker than L side. No new orders at this time.

## 2023-01-13 NOTE — PROGRESS NOTES
Pt intubated by Dr. Arnold Roz complication with 7.5 ETT. Positive color equal BS and chest rise. Placed on vent AC RR 25  100% o2 PEEP 7. Bed side bronch done.

## 2023-01-13 NOTE — PROCEDURES
Arterial Catheter Insertion Procedure Note   Procedure: Insertion of Arterial Line   Procedure Clinician: Lexie Saint, MD   Procedure Assistant: None   Indications: Cardiac arrest, hypotension, ARDS   Size and location: 20G right femoral artery  Attempts: 1   Procedure Details:   Under sterile conditions the skin above the Right femoral artery was prepped with chlorhexidine. Local anesthesia was applied to the skin and subcutaneous tissues. An Arrow kit was used to insert a 20-gauge needle into the artery. A guide wire was then passed easily through the needle without resistance. A 20 gauge catheter was then inserted into the vessel over the guide wire. The guidewire was then removed with the tip intact. The catheter was then secured into place. Findings: There were no changes to vital signs. Catheter was flushed with 20 mL NS. Patient tolerated the procedure well.

## 2023-01-13 NOTE — PROGRESS NOTES
[facility]  consulted by Dr. Jose Gibbs for monitoring and adjustment. Indication for treatment: Vancomycin indication: HAP  Goal trough: Trough Goal: 15-20 mcg/mL  AUC/FLAQUITO: 400-600    Risk Factors for MRSA Identified:   Hospitalization within the past 90 days, Received IV antibiotics within the past 90 days    Pertinent Laboratory Values:   Temp Readings from Last 3 Encounters:   01/13/23 99.5 °F (37.5 °C) (Rectal)     Recent Labs     01/10/23  1144 01/11/23  0350 01/11/23  0400 01/12/23  0500 01/13/23  0430   WBC 31.7* 38.1*  --  21.6* 11.2*   LACTATE 2.5*  --  1.4  --   --      Recent Labs     01/11/23  0350 01/12/23  0500 01/13/23  0024 01/13/23  0041 01/13/23  0430   BUN 5* 8  --   --  13   CREATININE 0.5* 0.5* 1.0 1.1 0.9     Estimated Creatinine Clearance: 48 mL/min (based on SCr of 0.9 mg/dL). Intake/Output Summary (Last 24 hours) at 1/13/2023 0744  Last data filed at 1/13/2023 0533  Gross per 24 hour   Intake 2080 ml   Output 841 ml   Net 1239 ml       Pertinent Cultures:   Date    Source    Results  01/13   Blood           Collected  01/13   Respiratory          To be collected  01/13   Urine           In process    Vancomycin level:   RANDOM:    Recent Labs     01/11/23  0350 01/12/23  0500 01/13/23  0430   VANCORANDOM 8.8 9.5 4.9       Assessment:  HPI: 67 y.o. female s/p PEA. Patient intubated. Current problems: septic shock, ARDS, hypoxic respiratory failure. SCr =0.9, BUN = 13, Severe acid base disturbance  Day(s) of therapy: 1 of 7  Vancomycin concentration:   01/14 - To be collected    Plan:  Vancomycin 1,250 mg IV initial dose; Follow with Vancomycin 750 mg IV Q 18 Hours  Predicted AUC: 437; Predicted Trough: 13.6  Pharmacy will continue to monitor patient and adjust therapy as indicated    Vivek 3 01/14/2023 @ 6 AM    Thank you for the consult.   Zuleika Miranda, [facility]  1/13/2023 7:44 AM

## 2023-01-13 NOTE — PROGRESS NOTES
Patient is on 3 vasopressors, poor neurological response off sedation, post cardiac arrest overnight. Intensivist Dr. Jax Grigsby along with PA and RN had a conversation with the patient's  family members-sons at the bedside. Updated overnight events and underlying medical conditions that led to acute events. Family members voiced understanding. Given the critical nature with grave prognosis, family members requested to change the CODE STATUS to DNR CCA  with no escalation of care. Awaiting rest of the family members to arrive to make any further changes. CODE STATUS changed accordingly.

## 2023-01-13 NOTE — PROGRESS NOTES
General Surgery-Dr. Alma Mayen Day: 4    Chief Complaint on Admission: GI bleed, ? GIST tumor on imaging      Subjective:     Events noted. Pt required intubation. Pt coded. Had bronch, A line, CVC. On pressors. Seen by Nephrology. Had left sided chest tube placed. ROS:  Review of Systems   Unable to perform ROS: Intubated     Allergies  Buprenorphine          Diagnosis Date    CAD (coronary artery disease)     s/p cabg    Cataract     Dysphagia     Epiglottiditis     GERD (gastroesophageal reflux disease)     GERD (gastroesophageal reflux disease)     Hematemesis     History of GI bleed     HLD (hyperlipidemia)     Iron deficiency     Mitral regurgitation     torn Chordae    Non-rheumatic mitral valve disease     OA (osteoarthritis)     PAF (paroxysmal atrial fibrillation) (HCC)     Thyroid disease     VRE (vancomycin resistant enterococcus) culture positive 2022       Objective:     Vitals:    23 0744   BP:    Pulse: (!) 117   Resp: (!) 34   Temp:        TEMPERATURE:  Current -Temp: 99.5 °F (37.5 °C); Max - Temp  Av.8 °F (37.1 °C)  Min: 98.2 °F (36.8 °C)  Max: 99.5 °F (37.5 °C)    No intake/output data recorded. I/O last 3 completed shifts: In:  [I.V.:1880; IV Piggyback:200]  Out: 0 [Urine:1585; Emesis/NG output:425; Chest Tube:270]      Physical Exam:  Physical Exam  Vitals reviewed. Constitutional:       Appearance: She is ill-appearing and toxic-appearing. HENT:      Mouth/Throat:      Comments: +ETT and +OGT  Eyes:      General: No scleral icterus. Cardiovascular:      Rate and Rhythm: Tachycardia present. Rhythm irregular. Pulmonary:      Comments: Intubated on vent    B/l chest tubes    Abdominal:      Palpations: Abdomen is soft. Tenderness: There is no abdominal tenderness.    Genitourinary:     Comments: +fabian          Scheduled Meds:   vasopressin        meropenem  1,000 mg IntraVENous Q12H    chlorhexidine  15 mL Mouth/Throat BID dexamethasone  20 mg IntraVENous Daily    inhalation builder   Inhalation Q4H    vancomycin  1,250 mg IntraVENous Once    [START ON 1/14/2023] vancomycin  750 mg IntraVENous Q18H    [Held by provider] metoprolol  5 mg IntraVENous Q6H    naloxone        norepinephrine-sodium chloride        propofol        pantoprazole  40 mg IntraVENous BID    [Held by provider] amiodarone  400 mg Oral Nightly    [Held by provider] atorvastatin  20 mg Oral Nightly    [Held by provider] ferrous sulfate  325 mg Oral Daily with breakfast    gabapentin  200 mg Oral Nightly    levothyroxine  75 mcg Oral Daily     ContinuousInfusions:   propofol Stopped (01/13/23 1700)    EPINEPHrine 20 mcg/min (01/13/23 0854)    norepinephrine 40 mcg/min (01/13/23 0524)    sodium bicarbonate infusion 200 mL/hr at 01/13/23 0943    vasopressin       PRN Meds:fentanNYL, potassium chloride **OR** potassium chloride, sodium phosphate IVPB **OR** sodium phosphate IVPB **OR** sodium phosphate IVPB, prochlorperazine, albuterol sulfate HFA, acetaminophen **OR** acetaminophen      Labs/Imaging Results:   Lab Results   Component Value Date    WBC 11.2 (H) 01/13/2023    HGB 8.6 (L) 01/13/2023    HCT 29.0 (L) 01/13/2023    MCV 99.3 01/13/2023     01/13/2023     Lab Results   Component Value Date     (H) 01/13/2023    K 4.1 01/13/2023     (H) 01/13/2023    CO2 11 (L) 01/13/2023    BUN 13 01/13/2023    CREATININE 0.9 01/13/2023    GLUCOSE 112 (H) 01/13/2023    CALCIUM 8.5 01/13/2023    PROT 4.0 (L) 01/13/2023    LABALBU 2.0 (L) 01/13/2023    BILITOT 0.6 01/13/2023    ALKPHOS 103 01/13/2023     (H) 01/13/2023    ALT 81 (H) 01/13/2023    LABGLOM >60 01/13/2023       Assessment:       66 y/o F with 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 Would be very unusual to develop tumor in such short period.  Dr. Norm Boss is following and at this time feels unnecessary to repeat EGD and I agree given short time from recent normal EGD.    -H/H relatively stable. 8.6 from 8 from 8.9.     -Overnight events noted.  Now on multiple pressors, intubated, critically ill s/p PEA arrest.         Electronically signed by Sid Sawyer II, MD on 1/13/2023 at 10:08 AM

## 2023-01-13 NOTE — CONSULTS
Nephrology Service Consultation      [address], [address] [address]  Phone: [phone]  Office Hours: 8:30AM - 4:30PM  Monday - Friday        MEDICAL DECISION MAKING and Recommendations     -Severe anon gap metabolic acidosis  -RISA/Oliguria  -Acute hypoxic resp failure  -S/P PEA  -Left pneumothorax  -GI bleed  -Septic shock    Suggest:  Sodium bicarb 150meq/L at 200ml/hr for now for the metabolic acidosis and a diuretic challenge later today. If none of the medical therapy helps then will consider CRRT  Critically ill    Thank you      Patient Active Problem List    Diagnosis Date Noted    Upper GI bleed 01/12/2023    Sepsis, unspecified organism ([facility] [address] 01/10/2023         Patient:  Cara Raymundo  MRN: [id number]  Consulting physician:  Marline Urias DO  Reason for Consult: serum bicarb 11  PCP: Dylan HOANG    HISTORY OF PRESENT ILLNESS:   The patient is a 67 y.o. female with cad s/p cabg, presented with abdominal pain and hematemesis  She was intubated in the ICU and had a PEA on 1/12/23  Renal consult for serum bicarb 11. UOP was 285 yesterday  She is intubated on 100% fio2.   She has a left pneumothorax thus a chest tube  She is on a pressors    REVIEW OF SYSTEMS:  Ca not obtain due to intubation    Past Medical History:        Diagnosis Date    CAD (coronary artery disease)     s/p cabg    Cataract     Dysphagia     Epiglottiditis     GERD (gastroesophageal reflux disease)     GERD (gastroesophageal reflux disease)     Hematemesis     History of GI bleed     HLD (hyperlipidemia)     Iron deficiency     Mitral regurgitation     torn Chordae    Non-rheumatic mitral valve disease     OA (osteoarthritis)     PAF (paroxysmal atrial fibrillation) (HCC)     Thyroid disease     VRE (vancomycin resistant enterococcus) culture positive 11/29/2022       Past Surgical History:        Procedure Laterality Date    APPENDECTOMY      BREAST BIOPSY Left 04/04/2017    CABG WITH AORTIC VALVE REPAIR      mitral valve repair. [place] to LAD    CARDIAC CATHETERIZATION      CARDIAC VALVE REPLACEMENT  2012    CATARACT REMOVAL Right 08/15/2018    CHOLECYSTECTOMY      COLONOSCOPY  2013    CORONARY ARTERY BYPASS GRAFT  06/06/2012    x1    ESOPHAGOGASTRODUODENOSCOPY  01/06/2023    ESOPHAGOGASTRODUODENOSCOPY  07/12/2022    GASTRIC BYPASS SURGERY      20 years ago    HYSTERECTOMY (CERVIX STATUS UNKNOWN)      JOINT REPLACEMENT Bilateral     TKR    KNEE ARTHROSCOPY Right 2014    MITRAL VALVE REPAIR      TONSILLECTOMY      TUBAL LIGATION      UPPER GASTROINTESTINAL ENDOSCOPY         Medications:   Prior to Admission medications    Medication Sig Start Date End Date Taking?  Authorizing Provider   cyanocobalamin 1000 MCG/ML injection Inject 1,000 mcg into the muscle every 30 days Receives on the 5 th of each month   Yes Historical Provider, MD   famotidine (PEPCID) 20 MG tablet Take 20 mg by mouth 2 times daily   Yes Historical Provider, MD   promethazine (PHENERGAN) 25 MG/ML injection Inject 6.25 mg into the muscle every 6 hours as needed (nausea)   Yes Historical Provider, MD   atorvastatin (LIPITOR) 20 MG tablet Take 20 mg by mouth nightly   Yes Historical Provider, MD   vitamin E 100 units capsule Take 100 Units by mouth daily   Yes Historical Provider, MD   aspirin (ASPIRIN 81) 81 MG EC tablet Take 81 mg by mouth daily   Yes Historical Provider, MD   levothyroxine (SYNTHROID) 75 MCG tablet Take 75 mcg by mouth Daily   Yes Historical Provider, MD   ferrous sulfate (IRON 325) 325 (65 Fe) MG tablet Take 325 mg by mouth daily (with breakfast)   Yes Historical Provider, MD   amiodarone (CORDARONE) 200 MG tablet Take 400 mg by mouth nightly   Yes Historical Provider, MD   polyethylene glycol (GLYCOLAX) 17 g packet Take 34 g by mouth daily   Yes Historical Provider, MD   naloxegol (MOVANTIK) 25 MG TABS tablet Take 25 mg by mouth every morning (before breakfast)   Yes Historical Provider, MD   [facility] Oil (VENELEX) OINT ointment Apply topically 2 times daily Apply to buttock   Yes Historical Provider, MD   gabapentin (NEURONTIN) 100 MG capsule Take 200 mg by mouth nightly. Yes Historical Provider, MD   senna (SENOKOT) 8.6 MG tablet Take 1 tablet by mouth nightly   Yes Historical Provider, MD   pantoprazole (PROTONIX) 40 MG tablet Take 40 mg by mouth 2 times daily   Yes Historical Provider, MD   docusate sodium (COLACE) 100 MG capsule Take 100 mg by mouth 2 times daily   Yes Historical Provider, MD   polycarbophil (FIBERCON) 625 MG tablet Take 625 mg by mouth 2 times daily   Yes Historical Provider, MD   tamsulosin (FLOMAX) 0.4 MG capsule Take 0.4 mg by mouth daily   Yes Historical Provider, MD   potassium chloride (KLOR-CON M) 10 MEQ extended release tablet Take 10 mEq by mouth 2 times daily   Yes Historical Provider, MD   HYDROcodone-acetaminophen (NORCO) 5-325 MG per tablet Take 1 tablet by mouth every 6 hours as needed for Pain. Yes Historical Provider, MD   aluminum & magnesium hydroxide-simethicone (MAALOX) 200-200-20 MG/5ML SUSP suspension Take 30 mLs by mouth daily as needed for Indigestion   Yes Historical Provider, MD   albuterol sulfate HFA (PROVENTIL;VENTOLIN;PROAIR) 108 (90 Base) MCG/ACT inhaler Inhale 2 puffs into the lungs every 6 hours as needed for Wheezing   Yes Historical Provider, MD        Allergies:  Buprenorphine    Social History:   TOBACCO:   reports that she has quit smoking. Her smoking use included cigarettes. She does not have any smokeless tobacco history on file. ETOH:   has no history on file for alcohol use.   OCCUPATION:      Family History:       Family history unknown: Yes     Physical Exam:    Vitals: BP (!) 84/59   Pulse (!) 117   Temp 99.5 °F (37.5 °C) (Rectal)   Resp (!) 34   Ht 5' 4.02\" (1.626 m)   Wt 124 lb 12.5 oz (56.6 kg)   SpO2 90%   BMI 21.41 kg/m²   General appearance: in no acute distress, appears stated age  Skin: Skin color, texture, turgor normal. No rashes or lesions  HEENT: normocephalic, atraumatic, ET tube present  Neck: supple, trachea midline  Lungs:breathing comfortably on mv  Heart[de-identified] regular rate and rhythm, S1, S2 normal,  Abdomen: soft, non-tender; bowel sounds normal; no masses,   Extremities: extremities normal, atraumatic, no cyanosis or edema  Neurologic: sedated  Psychiatric: mood and affect can not be assessed     CBC:   Recent Labs     01/11/23  0350 01/11/23  0823 01/12/23  0500 01/12/23  2335 01/13/23  0024 01/13/23  0041 01/13/23  0430   WBC 38.1*  --  21.6*  --   --   --  11.2*   HGB 9.4*   < > 8.6*   < > 8.9* 8.0* 8.6*     --  281  --   --   --  285    < > = values in this interval not displayed. BMP:    Recent Labs     01/11/23  0350 01/11/23  1401 01/12/23  0500 01/12/23  1728 01/13/23  0024 01/13/23  0041 01/13/23  0430     --  143   < > 145 149* 146*   K 3.2*   < > 3.3*   < > 4.0 3.6 4.1     --  107  --   --   --  111*   CO2 11*  --  21  --  17* 19* 11*   BUN 5*  --  8  --   --   --  13   CREATININE 0.5*  --  0.5*  --  1.0 1.1 0.9   GLUCOSE 143*  --  119*  --   --   --  112*    < > = values in this interval not displayed.      Hepatic:   Recent Labs     01/11/23  0350 01/12/23  0500 01/13/23  0430   AST 44* 26 197*   ALT 37 29 81*   BILITOT 0.4 0.4 0.6   ALKPHOS 73 72 103            Electronically signed by Myles Ware DO on 1/13/2023 at 8:34 AM    [facility]  MD Cassidy Ontiveros DO [address]  [place], [place] [address]  PHONE: [phone]  FAX: [phone]

## 2023-01-13 NOTE — PROCEDURES
Critical Care Intubation Note     Reason for consultation: Acute respiratory failure     Interventions prior to Anesthesiologist's arrival: IV access, AmbuBag     Procedure: Endotracheal intubation   [X] Airway equipment was checked. [X] Suction available.   [X] Monitors including pulse oximetry, NIBP, and ECG monitoring in place or applied. [X] The patient was preoxygenated. Ventilation   Ventilation: Easy   Cricoid Pressure: Yes   Rapid sequence induction: Yes   Cervical collar present: No   In line stabilization or cervical collar left in place: No     Induction   Induction was performed with 100 mg of Ketamine, 2 mg of versed, and 50 mg of Rocuronium for paralysis. Endotracheal Intubation   Intubation: was successful on 1 attempt. Route: Oral   Blade:  Mac 3   Adjuncts used: None   View of cords: Grade 1 view   ETT Size: 7.5 cuffed   Depth: 24 cm at the teeth   Confirmation: Colormetric change on ETCO2 detector x6, quantitative ETCO2   Secured with: ETT Alvarado   Complications: None   Intubation performed by:  Charline Dallas MD, Critical Care Attending

## 2023-01-13 NOTE — PROGRESS NOTES
Patient heart rate bradyed  down . None responsive, No pal pulse during intubation by ,  Size 7.5 ET tube. Code blue called, Immediate chest compressions and bagged resp, started, Patient had sm coffee ground emesis, oral suction and suction per ET tube done  2355 Epi IVP, asystole,  2359 pulse noted, briefly Paced ryth, then returning to AFR/RVR rate 130,  Patient non responsive, Bp low, Norepi gtt increased to 40mics per order, Og placed, to suction,     0020, [place] placed,rt radial site not functioning, A line placed rt femoral site per Dr. Navya Squires, Triple L. CVC placed rt groin per Navya Avila, >9 saline infusion to 999 ml/hr, x 1 liter,  No urine ops noted,  Vaso gtt started 0.03 units/min. Epoc labs done, Bicarb 1 amp IVP as ordered, Epi gtt started as ordered at 5 mics, Diprivan at 10 mics,     0045 repeat port chest XR done,   0055 left side plural chest tube placed per Dr. Navya Squires, to atrium water seal suction. BP 97/53.  AF vrate 120 to 130,   0110 port chest XR done , vent settings, rate set   20, vol 400, 7 peep, 100% fio2, sats are very hard to get a correct reading, poor wave forms, attempted several sites, Epocs done ,   0200, Patient cleansed,  and room cleaned, family here and at bedside, explanations as to all events given,   0220, ABGs done, Vent changes rate to 25, vol, 350, 100%fio2, 10 peep,

## 2023-01-14 NOTE — PROGRESS NOTES
1 unit of blood given STAT during downtime that was ordered. Start of admin at [phone] end [phone] for a total of 320ml. Blood admin sheet in soft chart. EXAM DESCRIPTION:  CT CERVICAL SPINE WITHOUT



COMPLETED DATE/TIME:  11/21/2019 12:53 pm



REASON FOR STUDY:  fall



COMPARISON:  CT chest 11/12/2019

CT brain same date



TECHNIQUE:  Axial images acquired through the cervical spine without intravenous contrast.  Images re
viewed with lung, soft tissue and bone windows.  Reconstructed coronal and sagittal MPR images review
ed.  Images stored on PACS.

All CT scanners at this facility use dose modulation, iterative reconstruction, and/or weight based d
osing when appropriate to reduce radiation dose to as low as reasonably achievable (ALARA).

[facility]: Dose Right  [facility]: CareDose    [facility]: Dose Right    [facility]: Teradose 4D    [facility]: Smart Technologies



RADIATION DOSE:  CT Rad equipment meets quality standard of care and radiation dose reduction techniq
ues were employed. CTDIvol: 14.8 mGy. DLP: 284 mGy-cm. mGy.



LIMITATIONS:  None.



FINDINGS:  ALIGNMENT: Anatomic.

MINERALIZATION: Normal.

VERTEBRAL BODIES: No fractures or dislocation.

DISCS: Mild diffuse disc space loss of height.  No significant central or foraminal stenosis

FACETS, LATERAL MASSES, POSTERIOR ELEMENTS: No fractures.  No dislocation.  No acute findings.  There
 is an anatomic variant of unfused spinous processes at C5 and C6, best shown on coronal images 17-20
, and sagittal images 20-23.

HARDWARE: None in the spine.

VISUALIZED RIBS: No fractures.

LUNG APICES AND SOFT TISSUES: 8 mm hypodensity in the right lobe thyroid unchanged.

OTHER: No other significant finding.



IMPRESSION:  No acute fracture or malalignment



TECHNICAL DOCUMENTATION:  JOB ID:  [id number]

Quality ID # 436: Final reports with documentation of one or more dose reduction techniques (e.g., Au
tomated exposure control, adjustment of the mA and/or kV according to patient size, use of iterative 
reconstruction technique)

 2011 [employer]- All Rights Reserved



Reading location - IP/workstation name: [email or web address]

## 2023-01-14 NOTE — PROGRESS NOTES
I respoke with the family specifically Anel Merino the patient's son. It is his opinion that care should be withdrawn including discontinuation of all aggressive medical measures (mechanical ventilation, pressor agents, other medical measures as well). He has requested  services. He also wishes to have other family members are readily available, I offered that he as well as any other family members could remain in the room during the process of extubation and withdrawal since I do not believe that Mrs. Dakota Dykes will survive for any extended length of time once aggressive medical measures have been discontinued. I did review this situation in detail with the nursing staff.     Paul Bedolla  [phone]

## 2023-01-14 NOTE — PROGRESS NOTES
Nephrology Progress Note        [address], [address] [address]  Phone: [phone]  Office Hours: 8:30AM - 4:30PM  Monday - Friday 1/14/2023 7:55 AM  Subjective:   Admit Date: 1/10/2023  PCP: Anuradha HOANG  Interval History:   INTUBATED  HYPOtensive  Anuric despite the diuretic challenges yesterday    Diet: Diet NPO      Data:   Scheduled Meds:   meropenem  1,000 mg IntraVENous Q12H    chlorhexidine  15 mL Mouth/Throat BID    dexamethasone  20 mg IntraVENous Daily    inhalation builder   Inhalation Q4H    vancomycin  750 mg IntraVENous Q18H    bumetanide  2 mg IntraVENous Once    [Held by provider] metoprolol  5 mg IntraVENous Q6H    pantoprazole  40 mg IntraVENous BID    [Held by provider] amiodarone  400 mg Oral Nightly    [Held by provider] atorvastatin  20 mg Oral Nightly    [Held by provider] ferrous sulfate  325 mg Oral Daily with breakfast    gabapentin  200 mg Oral Nightly    levothyroxine  75 mcg Oral Daily     Continuous Infusions:   sodium chloride      propofol Stopped (01/13/23 1700)    EPINEPHrine Stopped (01/14/23 0658)    sodium bicarbonate infusion 100 mL/hr at 01/13/23 2225    vasopressin 0.04 Units/min (01/14/23 0725)    norepinephrine 27 mcg/min (01/14/23 0643)     PRN Meds:sodium chloride, fentanNYL, potassium chloride **OR** potassium chloride, sodium phosphate IVPB **OR** sodium phosphate IVPB **OR** sodium phosphate IVPB, prochlorperazine, albuterol sulfate HFA, acetaminophen **OR** acetaminophen  I/O last 3 completed shifts: In: 2080 [I.V.:1880; IV Piggyback:200]  Out: 670 [Urine:110; Emesis/NG output:300; Chest Tube:260]  No intake/output data recorded.     Intake/Output Summary (Last 24 hours) at 1/14/2023 0755  Last data filed at 1/14/2023 0659  Gross per 24 hour   Intake --   Output 75 ml   Net -75 ml       CBC:   Recent Labs     01/12/23  0500 01/12/23  2335 01/13/23  0430 01/13/23  2109 01/14/23  0002 01/14/23  0010 01/14/23  0606   WBC 21.6*  -- 11.2*  --  18.6*  --   --    HGB 8.6*   < > 8.6*   < > 6.2* 6.8* 9.4*     --  285  --  129*  --   --     < > = values in this interval not displayed. BMP:    Recent Labs     01/13/23  0430 01/13/23 2013 01/13/23  2109 01/14/23  0010 01/14/23  0606 01/14/23  0610   * 139 141 140 137* 137   K 4.1 4.5 4.5 4.4 4.4 4.4   * 100  --   --   --  94*   CO2 11* 15* 17*  --  19* 15*   BUN 13 15  --   --   --  15   CREATININE 0.9 1.0 1.5*  --  1.8* 1.4*   GLUCOSE 112* 121*  --   --   --  111*     Hepatic:   Recent Labs     01/12/23  0500 01/13/23  0430 01/13/23 2013   AST 26 197* 2,004*   ALT 29 81* 564*   BILITOT 0.4 0.6 1.4*   ALKPHOS 72 103 121     Troponin: No results for input(s): TROPONINI in the last 72 hours. BNP: No results for input(s): BNP in the last 72 hours. Lipids: No results for input(s): CHOL, HDL in the last 72 hours.     Invalid input(s): LDLCALCU  ABGs:   Lab Results   Component Value Date/Time    PO2ART 216.0 01/14/2023 06:06 AM    [id number] 36.3 01/14/2023 06:06 AM     INR:   Recent Labs     01/12/23  0500 01/13/23 2013 01/14/23  0610   INR 0.93 2.44 3.09       Objective:   Vitals: /68   Pulse (!) 124   Temp 100 °F (37.8 °C) (Rectal)   Resp 30   Ht 5' 4.02\" (1.626 m)   Wt 124 lb (56.2 kg)   SpO2 (!) 84%   BMI 21.27 kg/m²   General appearance:  in no acute distress  HEENT: normocephalic, atraumatic, ET Tube in place  Neck: supple, trachea midline  Lungs: , breathing comfortably on mv,   Heart[de-identified] regular rate and rhythm, S1, S2 normal,  Abdomen: soft, non distended,   Extremities: extremities atraumatic, no cyanosis or edema      MEDICAL DECISION MAKING     Patient Active Problem List    Diagnosis Date Noted    Upper GI bleed 01/12/2023    Sepsis, unspecified organism ([facility] [address] 01/10/2023     Anuric  Remains acidotic though better than yesterday  Hgb is now dropping and lactate level trending up  The next step is CRRT  I hear there will be a family meeting today for goals of care, please let me know the conclusions from that meeting  Critically ill    Thank you                    Electronically signed by Karthikeyan Mckeon DO on 1/14/2023 at 7:55 AM    MD Colt Kennedy DO [address]  [place], [place] [address]  PHONE: [phone]  FAX: [phone]

## 2023-01-14 NOTE — PROGRESS NOTES
I have personally seen and examined the patient independently. I have reviewed the patient's available data,including medical history and recent test results. I reviewed and discussed detailed documentation as provided by JACE Harrington. I agree with the physical exam findings, assessment and plan. My documented complex medical decision making constitutes a substantive portion of the supervisory note. The events of the evening 1/12/2023, reviewed in detail. Acute respiratory failure with hypoxia  Suspected aspiration pneumonia/pneumonitis  Evolving ARDS  Status post cardiopulmonary resuscitation for PEA arrest, ROSC within 5 minutes. Post resuscitation shock, vasopressor reliant  Encephalopathy post arrest, suspect anoxic injury  Acute kidney injury, currently oligoanuric  Chronic systolic diastolic heart failure, concurrent atrial fibrillation (chronic), PPM  Remote CABG  Bilateral pleural effusions (transudative, mixed cellularity), left pneumothorax following cardiopulmonary resuscitation 1/12/2023 (chest tube placed note airleak). 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 ESBL Klebsiella, Enterococcal UTI    Continue high level ventilatory support, FiO2 PEEP 100% 10 cm of water pressure respectively (marginal oxygenation, hypercapnia noted)  ATB coverage for possible/suspect aspiration, coverage of urinary infection (ESBL, Enterococcus)  Hemodynamic support Levophed, Vasopressin  Medical management Atrial fibrillation minus anticoagulation  PPI  GI consultation/input appreciated, as previously discussed, consult bariatric surgery for opinion regarding additional evaluation, management of GE junction lesion ([facility] contacted 1/11/2023 given serial evaluations for this issues at that facility, reportedly no beds available). Appreciate input Nephrology service. Given the patient's current clinical status, chronicity of illness plus severely impaired functional status, lack of anticipated survivability under the current circumstance, the family opted to transition code status to DNR CCA (1/13/2023). I suggested that comfort measures, withdrawal of support reasonable option given the the clinical situation. They wish to discuss further. In the meantime, continue current supportive measures but no escalation of care, repeat resuscitation. Complex medical decisions reviewed in detail during critical care rounds.     Braov Albarran  [phone]

## 2023-01-14 NOTE — PROGRESS NOTES
V2.0  [facility]      Name:  Judie Greene /Age/Sex: 1950  (67 y.o. female)   MRN & CSN:  [id number] & [id number] Encounter Date/Time: 2023 8:41 PM EST    Location:  -A PCP: Frankie Charles Day: 4    Assessment and Plan:   Judie Greene is a 67 y.o. 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    Bilateral pleural effusions with chronic systolic/diastolic heart failure  -Echo this admit with severe tricuspid regurgitation, RVSP 57 mmHg  -EF 35% per echo 2022    S/p thoracentesis 2022  -Follow-up final labs from thoracentesis    Postprocedure right pneumothorax status post chest tube 2022   -Airleak is absent    Aspiration pneumonia is suspected  -IV meropenem    COVID positive  -Dexamethasone started  but later stopped as more likely she has aspiration pneumonia  -Is out of isolation.     Initially sepsis noted upon admission-felt to be due to pneumonia    Metabolic acidosis noted on admission  -Monitor    Chest pain with elevated troponin  -Troponin went from 0.019-0.013 to less than 0.01-likely due to demand ischemia    Constipation with impacted stool noted upon admission  -Bowel regimen    Chronic pain, opiate dependence    Urine culture sent 1/10 with Klebsiella pneumonia ESBL 100,000 CFU per mL, and Enterococcus faecalis 50,000 CFU per mL  -1/12 evening-we will give 1 dose of meropenem, critical care will be back tomorrow in the morning    S/p A-line 1/11/2022    Hypertension  Hyperlipidemia  BMI 18.9  Hypothyroidism  B12 deficiency    Please see H&P for more details    Diet Diet NPO   DVT Prophylaxis [] Lovenox, []  Heparin, [] SCDs, [] Ambulation,  [] Eliquis, [] Xarelto  [] Coumadin   Code Status DNR-CCA   Disposition From: [facility]  Expected Disposition: To be determined  Estimated Date of Discharge: When above issues improve  Patient requires continued admission due to Vapotherm FiO2 85%, and multiple other issues   Surrogate Decision Maker/ POA  Sommer Costello is listed as the alternate contact in the chart     Subjective:     Chief Complaint: Hematemesis (2 x coffee ground) and Chest Pain       Etelvina Campbell is a 67 y.o. female who presents with hematemesis      January 13  - Had a very eventful night last night  - When I saw her she was on 3 pressors. Son was at the bedside. Several other visitors were there as well. FiO2 on vent was 100%, and PEEP was 10. She was maintaining a good blood pressure and O2 sats. Her son indicated that she was following commands and squeezing his hand on command. January 12  -RN reported patient was regurgitation all attempted p.o. intake. SLP to see her tomorrow.  -Bariatric surgery consult from today approved. .  It is felt unlikely that she has a GEJ tumor (CT scan on admission showed suggestion of a tumor, but EGD done 6 days earlier did not show a tumor as per report). -Per bariatric surgery patient is already seeing surgical oncology at [facility]  -She was supine in bed when I saw her  -Heart rate 10 6-1 15 when I saw her-she was not requiring any drips for that. Blood pressure was 106/72.         January 11    She was on Vapotherm 90% when I saw her  She has a right chest tube  She was tachycardic on the monitor-later fluids were started  She was supine in bed  Her  and son were at the bedside         Review of Systems:    Review of Systems    Not able to do due to being on the ventilator    Objective: Intake/Output Summary (Last 24 hours) at 1/13/2023 2341  Last data filed at 1/13/2023 1800  Gross per 24 hour   Intake 2080 ml   Output 625 ml   Net 1455 ml          Vitals:   Vitals:    01/13/23 2338   BP:    Pulse: (!) 120   Resp: 23   Temp:    SpO2:        Physical Exam:     Physical Exam  Constitutional:       Appearance: She is ill-appearing.    Pulmonary: Intubated  Neurologic: Sedated    Medications:   Medications:    meropenem  1,000 mg IntraVENous Q12H    chlorhexidine  15 mL Mouth/Throat BID    dexamethasone  20 mg IntraVENous Daily    inhalation builder   Inhalation Q4H    [START ON 1/14/2023] vancomycin  750 mg IntraVENous Q18H    bumetanide  2 mg IntraVENous Once    [Held by provider] metoprolol  5 mg IntraVENous Q6H    pantoprazole  40 mg IntraVENous BID    [Held by provider] amiodarone  400 mg Oral Nightly    [Held by provider] atorvastatin  20 mg Oral Nightly    [Held by provider] ferrous sulfate  325 mg Oral Daily with breakfast    gabapentin  200 mg Oral Nightly    levothyroxine  75 mcg Oral Daily      Infusions:    propofol Stopped (01/13/23 1700)    EPINEPHrine 1 mcg/min (01/13/23 2049)    sodium bicarbonate infusion 100 mL/hr at 01/13/23 2225    vasopressin 0.04 Units/min (01/13/23 2310)    norepinephrine 30 mcg/min (01/13/23 2053)       PRN Meds: fentanNYL, 50 mcg, Q1H PRN  potassium chloride, 20 mEq, PRN   Or  potassium chloride, 10 mEq, PRN  sodium phosphate IVPB, 10 mmol, PRN   Or  sodium phosphate IVPB, 15 mmol, PRN   Or  sodium phosphate IVPB, 20 mmol, PRN  prochlorperazine, 10 mg, Q6H PRN  albuterol sulfate HFA, 2 puff, Q6H PRN  acetaminophen, 650 mg, Q6H PRN   Or  acetaminophen, 650 mg, Q6H PRN      Labs      Recent Results (from the past 24 hour(s))   POCT Glucose Collection Time: 01/12/23 11:58 PM   Result Value Ref Range    POC Glucose 112 (H) 70 - 99 MG/DL   POC CRITICAL CARE PROFILE    Collection Time: 01/13/23 12:24 AM   Result Value Ref Range    pH, Bld 7.15 (L) 7.34 - 7.45    pCO2, Arterial 43.5 32 - 45 MMHG    pO2, Arterial 42.5 (L) 75 - 100 MMHG    HCO3, Arterial 15.2 (L) 18 - 23 MMOL/L    Base Excess MINUS 0 - 2.4    Base Exc, Mixed 12.8 (H) 0 - 2.3    O2 Sat 64.2 (L) 96 - 97 %    CO2 17 (L) 21 - 32 MMOL/L    Sodium 145 138 - 146 MMOL/L    Potassium 4.0 3.5 - 4.5 MMOL/L    POC CALCIUM 1.45 (H) 1.12 - 1.32 MMOL/L    POC Glucose 198 (H) 70 - 99 MG/DL    Hematocrit 26.0 (L) 37 - 47 %    Hemoglobin 8.9 (L) 12.5 - 16.0 GM/DL    POC Chloride 114 (H) 98 - 109 MMOL/L    POC Creatinine 1.0 0.6 - 1.1 MG/DL    eGFR, POC 59 (L) >60 mL/min/1.73m2    Source: Arterial    POC CRITICAL CARE PROFILE    Collection Time: 01/13/23 12:41 AM   Result Value Ref Range    pH, Bld 7.23 (L) 7.34 - 7.45    pCO2, Arterial 42.9 32 - 45 MMHG    pO2, Arterial 43.5 (L) 75 - 100 MMHG    HCO3, Arterial 17.8 (L) 18 - 23 MMOL/L    Base Excess MINUS 0 - 2.4    Base Exc, Mixed 9.2 (H) 0 - 2.3    O2 Sat 70.1 (L) 96 - 97 %    CO2 19 (L) 21 - 32 MMOL/L    Sodium 149 (H) 138 - 146 MMOL/L    Potassium 3.6 3.5 - 4.5 MMOL/L    POC CALCIUM 1.39 (H) 1.12 - 1.32 MMOL/L    POC Glucose 179 (H) 70 - 99 MG/DL    Hematocrit 24.0 (L) 37 - 47 %    Hemoglobin 8.0 (L) 12.5 - 16.0 GM/DL    POC Chloride 113 (H) 98 - 109 MMOL/L    POC Creatinine 1.1 0.6 - 1.1 MG/DL    eGFR, POC 53 (L) >60 mL/min/1.73m2    Source: Arterial    Blood gas, arterial    Collection Time: 01/13/23  2:00 AM   Result Value Ref Range    pH, Bld 7.27 (L) 7.34 - 7.45    pCO2, Arterial 32.0 32 - 45 MMHG    pO2, Arterial 55 (L) 75 - 100 MMHG    Base Excess 11 (H) 0 - 2.4    HCO3, Arterial 14.7 (L) 18 - 23 MMOL/L    CO2 Content 15.7 (L) 19 - 24 MMOL/L    O2 Sat 80.4 (L) 96 - 97 %    Carbon Monoxide, Blood 2.1 0 - 5 %    Methemoglobin, Arterial 1.0 <1.5 % Comment 25 400 100 P7    Blood gas, arterial    Collection Time: 01/13/23  3:00 AM   Result Value Ref Range    pH, Bld 7.15 (L) 7.34 - 7.45    pCO2, Arterial 42.0 32 - 45 MMHG    pO2, Arterial 64 (L) 75 - 100 MMHG    Base Excess 14 (H) 0 - 2.4    HCO3, Arterial 14.6 (L) 18 - 23 MMOL/L    CO2 Content 15.9 (L) 19 - 24 MMOL/L    O2 Sat 86.1 (L) 96 - 97 %    Carbon Monoxide, Blood 2.5 0 - 5 %    Methemoglobin, Arterial 1.0 <1.5 %   CBC with Auto Differential    Collection Time: 01/13/23  4:30 AM   Result Value Ref Range    WBC 11.2 (H) 4.0 - 10.5 K/CU MM    RBC 2.92 (L) 4.2 - 5.4 M/CU MM    Hemoglobin 8.6 (L) 12.5 - 16.0 GM/DL    Hematocrit 29.0 (L) 37 - 47 %    MCV 99.3 78 - 100 FL    MCH 29.5 27 - 31 PG    MCHC 29.7 (L) 32.0 - 36.0 %    RDW 18.1 (H) 11.7 - 14.9 %    Platelets 337 477 - 135 K/CU MM    MPV 11.0 7.5 - 11.1 FL    Myelocyte Percent 1 (H) 0.0 %    Metamyelocytes Relative 1 (H) 0.0 %    Bands Relative 23 (H) 5 - 11 %    Segs Relative 63.0 36 - 66 %    Lymphocytes % 12.0 (L) 24 - 44 %    nRBC 6     Myelocytes Absolute 0.11 K/CU MM    Metamyelocytes Absolute 0.11 K/CU MM    Bands Absolute 2.58 K/CU MM    Segs Absolute 7.1 K/CU MM    Lymphocytes Absolute 1.3 K/CU MM    Differential Type MANUAL DIFFERENTIAL     RBC Morphology OCCASIONAL SCHISTOCYTES     Anisocytosis 2+     Polychromasia 1+     [place] Cells 1+     Acanthocytes 1+    Critical Care Panel    Collection Time: 01/13/23  4:30 AM   Result Value Ref Range    Sodium 146 (H) 135 - 145 MMOL/L    Potassium 4.1 3.5 - 5.1 MMOL/L    Chloride 111 (H) 99 - 110 mMol/L    CO2 11 (L) 21 - 32 MMOL/L    Anion Gap 24 (H) 4 - 16    BUN 13 6 - 23 MG/DL    Creatinine 0.9 0.6 - 1.1 MG/DL    Est, Glom Filt Rate >60 >60 mL/min/1.73m2    Glucose 112 (H) 70 - 99 MG/DL    Calcium 8.5 8.3 - 10.6 MG/DL    Phosphorus 3.5 2.5 - 4.9 MG/DL    Magnesium 2.0 1.8 - 2.4 mg/dl   Hepatic Function Panel    Collection Time: 01/13/23  4:30 AM   Result Value Ref Range    Albumin 2.0 (L) 3.4 - 5.0 GM/DL    Total Bilirubin 0.6 0.0 - 1.0 MG/DL    Bilirubin, Direct 0.4 (H) 0.0 - 0.3 MG/DL    Bilirubin, Indirect 0.2 0 - 0.7 MG/DL    Alkaline Phosphatase 103 40 - 129 IU/L     (H) 15 - 37 IU/L    ALT 81 (H) 10 - 40 U/L    Total Protein 4.0 (L) 6.4 - 8.2 GM/DL   Urinalysis with Reflex to Culture    Collection Time: 01/13/23  4:30 AM    Specimen: Urine   Result Value Ref Range    Color, UA YELLOW YELLOW    Clarity, UA TURBID (A) CLEAR    Glucose, Urine NEGATIVE NEGATIVE MG/DL    Bilirubin Urine NEGATIVE NEGATIVE MG/DL    Ketones, Urine 15 (A) NEGATIVE MG/DL    Specific Gravity, UA 1.015 1.001 - 1.035    Blood, Urine TRACE (A) NEGATIVE    pH, Urine 5.5 5.0 - 8.0    Protein, UA 30 (A) NEGATIVE MG/DL    Urobilinogen, Urine 0.2 0.2 - 1.0 MG/DL    Nitrite Urine, Quantitative NEGATIVE NEGATIVE    Leukocyte Esterase, Urine SMALL NUMBER OR AMOUNT OBSERVED (A) NEGATIVE    RBC, UA NONE SEEN 0 - 6 /HPF    WBC,  (H) 0 - 5 /HPF    WBC Clumps, UA RARE /HPF    Bacteria, UA NEGATIVE NEGATIVE /HPF    Yeast, UA FEW /HPF    Hyphenated Yeast OCCASIONAL /HPF    Trichomonas, UA NONE SEEN NONE SEEN /HPF    Hyaline Casts, UA 0 /LPF    Amorphous, UA OCCASIONAL /HPF   Vancomycin Level, Random    Collection Time: 01/13/23  4:30 AM   Result Value Ref Range    Vancomycin Rm 4.9 UG/ML    DOSE AMOUNT DOSE AMT.  GIVEN - UNKNOWN     DOSE TIME DOSE TIME GIVEN - UNKNOWN    Triglyceride    Collection Time: 01/13/23  4:30 AM   Result Value Ref Range    Triglycerides 147 <150 MG/DL   C-Reactive Protein    Collection Time: 01/13/23  4:30 AM   Result Value Ref Range    CRP High Sensitivity 152.7 (H) <5.0 mg/L   Sedimentation Rate    Collection Time: 01/13/23  4:30 AM   Result Value Ref Range    Sed Rate 3 0 - 30 MM/HR   Procalcitonin    Collection Time: 01/13/23  4:30 AM   Result Value Ref Range    Procalcitonin 1.11    Blood Gas, Arterial    Collection Time: 01/13/23  6:00 AM   Result Value Ref Range    pH, Bld 7.11 (L) 7.34 - 7.45 pCO2, Arterial 36.0 32 - 45 MMHG    pO2, Arterial 64 (L) 75 - 100 MMHG    Base Excess 17 (H) 0 - 2.4    HCO3, Arterial 11.4 (L) 18 - 23 MMOL/L    CO2 Content 12.5 (L) 19 - 24 MMOL/L    O2 Sat 91.8 (L) 96 - 97 %    Carbon Monoxide, Blood 2.5 0 - 5 %    Methemoglobin, Arterial 1.0 <1.5 %   Blood gas, arterial    Collection Time: 01/13/23  7:15 PM   Result Value Ref Range    pH, Bld 7.31 (L) 7.34 - 7.45    pCO2, Arterial 30.0 (L) 32 - 45 MMHG    pO2, Arterial 181 (H) 75 - 100 MMHG    Base Excess 10 (H) 0 - 2.4    HCO3, Arterial 15.1 (L) 18 - 23 MMOL/L    CO2 Content 16.0 (L) 19 - 24 MMOL/L    O2 Sat 96.9 96 - 97 %    Carbon Monoxide, Blood 1.4 0 - 5 %    Methemoglobin, Arterial 1.3 <1.5 %   Lactic Acid    Collection Time: 01/13/23  8:13 PM   Result Value Ref Range    Lactate 13.6 (HH) 0.5 - 1.9 mMOL/L   Critical Care Panel    Collection Time: 01/13/23  8:13 PM   Result Value Ref Range    Sodium 139 135 - 145 MMOL/L    Potassium 4.5 3.5 - 5.1 MMOL/L    Chloride 100 99 - 110 mMol/L    CO2 15 (L) 21 - 32 MMOL/L    Anion Gap 24 (H) 4 - 16    BUN 15 6 - 23 MG/DL    Creatinine 1.0 0.6 - 1.1 MG/DL    Est, Glom Filt Rate 59 (L) >60 mL/min/1.73m2    Glucose 121 (H) 70 - 99 MG/DL    Calcium 7.3 (L) 8.3 - 10.6 MG/DL    Phosphorus 4.0 2.5 - 4.9 MG/DL    Magnesium 1.8 1.8 - 2.4 mg/dl   Hepatic Function Panel    Collection Time: 01/13/23  8:13 PM   Result Value Ref Range    Albumin 1.9 (L) 3.4 - 5.0 GM/DL    Total Bilirubin 1.4 (H) 0.0 - 1.0 MG/DL    Bilirubin, Direct 1.1 (H) 0.0 - 0.3 MG/DL    Bilirubin, Indirect 0.3 0 - 0.7 MG/DL    Alkaline Phosphatase 121 40 - 129 IU/L    AST 2,004 (H) 15 - 37 IU/L     (H) 10 - 40 U/L    Total Protein 3.4 (L) 6.4 - 8.2 GM/DL   Protime/INR & PTT    Collection Time: 01/13/23  8:13 PM   Result Value Ref Range    Protime 31.8 (H) 11.7 - 14.5 SECONDS    INR 2.44 INDEX    aPTT 43.6 (H) 25.1 - 37.1 SECONDS   TYPE AND SCREEN    Collection Time: 01/13/23  9:00 PM   Result Value Ref Range    ABO/Rh A POSITIVE     Antibody Screen NEGATIVE         Imaging/Diagnostics Last 24 Hours   CT ABDOMEN PELVIS WO CONTRAST Additional Contrast? None    Result Date: 1/10/2023  EXAMINATION: CT OF THE ABDOMEN AND PELVIS WITHOUT CONTRAST 1/10/2023 1:42 pm TECHNIQUE: CT of the abdomen and pelvis was performed without the administration of intravenous contrast. Multiplanar reformatted images are provided for review. Automated exposure control, iterative reconstruction, and/or weight based adjustment of the mA/kV was utilized to reduce the radiation dose to as low as reasonably achievable. COMPARISON: None. HISTORY: ORDERING SYSTEM PROVIDED HISTORY: abdominal pain, hematemesis TECHNOLOGIST PROVIDED HISTORY: Reason for exam:->abdominal pain, hematemesis Additional Contrast?->None Decision Support Exception - unselect if not a suspected or confirmed emergency medical condition->Emergency Medical Condition (MA) Reason for Exam: abdominal pain, hematemesis FINDINGS: Lower Chest: There is a partially calcified soft tissue mass noted in the GE junction. It measures 10 x 6.6 x 9 cm. The appearance suggests a leiomyosarcoma or GIST. There are moderate bilateral pleural effusions with compressive atelectasis in the lung bases. Patient is status post sternotomy. Pacer leads are in good position. Organs: The the liver, pancreas appear unremarkable. There are calcified granulomas in the the spleen. The patient is status post cholecystectomy. The adrenal glands and kidneys are unremarkable. GI/Bowel: Small bowel loops appear unremarkable. There is impaction of the rectum with dense barium. Pelvis: There is a Catalan catheter in the bladder. The patient is status post hysterectomy. Peritoneum/Retroperitoneum: Unremarkable Bones/Soft Tissues: Osteoarthritic changes are noted in lower lumbar facet joints. Large partially calcified mass noted in the region of the GE junction. It measures 10 x 6.6 x 9 cm.   This may represent a leiomyosarcoma or GIST. Moderate bilateral pleural effusion with compressive atelectasis in lung bases. Dense barium impaction of the rectum. Patient is status post cholecystectomy and hysterectomy. Calcified granulomas noted in the spleen. Catalan catheter in the bladder. XR ABDOMEN (KUB) (SINGLE AP VIEW)    Result Date: 1/11/2023  EXAMINATION: ONE SUPINE XRAY VIEW(S) OF THE ABDOMEN 1/11/2023 5:44 am COMPARISON: CT abdomen pelvis dated 01/10/2023. HISTORY: ORDERING SYSTEM PROVIDED HISTORY: ng tube insertion TECHNOLOGIST PROVIDED HISTORY: Reason for exam:->ng tube insertion Reason for Exam: ng tube insertion FINDINGS: Evaluation is partially limited due to multiple lines overlying the lower chest and upper abdomen, presumably outside of the patient. Enteric tube is seen with the distal portions of the tube coiled within the lower chest/upper abdomen coursing superiorly with the tip located overlying the right perihilar region. Repositioning is recommended. Partially visualized are leads of a cardiac conduction device and changes of prior open heart surgery. Nonspecific nonobstructive bowel gas pattern is seen within the visualized upper abdomen. There is airspace opacity of the bilateral mid to lower lung field. Distal pole is coiled within the lower chest and upper abdomen coursing superiorly with the tip overlying the right perihilar region. Recommend repositioning. Airspace opacity of the bilateral mid to lower lung field. The findings were sent to the Radiology Results [id number] at 7:55 am on 1/11/2023 to be communicated to a licensed caregiver. XR CHEST PORTABLE    Result Date: 1/11/2023  EXAMINATION: ONE XRAY VIEW OF THE CHEST 1/11/2023 2:45 am COMPARISON: January 11, 2023. HISTORY: ORDERING SYSTEM PROVIDED HISTORY: chest tube TECHNOLOGIST PROVIDED HISTORY: Reason for exam:->chest tube Reason for Exam: chest tube FINDINGS: Right chest tube is now present.   Nasogastric tube again identified. Left chest wall pacer in place. Cardiomediastinal silhouette is enlarged. There is interval decrease in size of a now small right pneumothorax. Persistent basilar opacities and trace effusions are seen. No acute osseous abnormality. 1. New right chest tube with interval decrease in size of a now small right pneumothorax. 2. Stable basilar opacities and trace effusions. XR CHEST PORTABLE    Result Date: 1/10/2023  EXAMINATION: ONE XRAY VIEW OF THE CHEST 1/10/2023 11:50 am COMPARISON: None. HISTORY: ORDERING SYSTEM PROVIDED HISTORY: Chest pain TECHNOLOGIST PROVIDED HISTORY: Reason for exam:->Chest pain Reason for Exam: chest pain FINDINGS: There are postsurgical changes of median sternotomy. The heart size is enlarged. The pulmonary vasculature is within normal limits. There is increased density involving the lower lung zones bilaterally with blunting of the costophrenic angles. There is a left subclavian AICD. 1. Cardiomegaly without overt failure. 2. Small bilateral pleural effusions with lower lobe airspace disease. I would recommend follow-up to resolution. XR CHEST 1 VIEW    Result Date: 1/11/2023  EXAMINATION: ONE XRAY VIEW OF THE CHEST 1/11/2023 2:03 am COMPARISON: January 10, 2023. HISTORY: ORDERING SYSTEM PROVIDED HISTORY: got BL thoracentesis TECHNOLOGIST PROVIDED HISTORY: Reason for exam:->got BL thoracentesis Reason for Exam: got BL thoracentesis FINDINGS: Nasogastric tube appears to be within a hiatal hernia. Left chest wall pacer in place. Cardiomediastinal silhouette is enlarged. The lungs show interval appearance of a large right pneumothorax. No evidence of tension. No discrete left pneumothorax is identified. Basilar airspace opacities are noted with trace effusions. No new osseous abnormality. Status post sternotomy. 1. Nasogastric tube is coiled within the hiatal hernia. 2. New large right pneumothorax. No evidence of tension.  3. Persistent basilar opacities with trace effusions. Patient is status post bilateral thoracentesis. 4. Cardiomegaly. The findings were sent to the Radiology Results [id number] at 2:37 am on 1/11/2023 to be communicated to a licensed caregiver. VL DUP UPPER EXTREMITY VENOUS BILATERAL    Result Date: 1/11/2023  EXAMINATION: DUPLEX ULTRASOUND OF THE BILATERAL UPPER EXTREMITIES FOR DVT, 1/11/2023 12:02 pm TECHNIQUE: Duplex ultrasound using B-mode/gray scaled imaging and Doppler spectral analysis and color flow was obtained of the deep venous structures of the bilateral upper extremities. COMPARISON: None. HISTORY: ORDERING SYSTEM PROVIDED HISTORY: edema TECHNOLOGIST PROVIDED HISTORY: Reason for exam:->edema FINDINGS: STUDY LIMITED BY EDEMA AND BANDAGES ON THE UPPER EXTREMITIES Visualized right and left upper extremity veins demonstrate unremarkable flow and compressibility. There is no evidence of echogenic thrombus. The veins demonstrate good compressibility with normal color flow study and spectral analysis. Study limited by presence of upper extremity edema and dressings however, visualized upper extremity veins demonstrate no sonographic/Doppler evidence of DVT. XR ABDOMEN FOR NG/OG/NE TUBE PLACEMENT    Result Date: 1/11/2023  EXAMINATION: ONE SUPINE XRAY VIEW(S) OF THE ABDOMEN 1/11/2023 8:40 am COMPARISON: 01/11/2023 at 0552 hours HISTORY: ORDERING SYSTEM PROVIDED HISTORY: NG pulled back 5 TECHNOLOGIST PROVIDED HISTORY: Reason for exam:->NG pulled back 5 Portable? ->Yes Reason for Exam: NG pulled back 5 FINDINGS: Nasogastric tube tip and side port curled within decompressed stomach and projects in satisfactory position. No other significant portable radiographic change noted. Persistent bilateral mid-basilar consolidating infiltrates, cardiomegaly, postoperative changes again seen. No detectable pneumothorax or large pleural effusion.      Nasogastric tube tip and side port projecting within decompressed stomach in satisfactory position. No other significant interval change compared to prior study.        Electronically signed by Nyasia Heredia MD on 1/13/2023 at 11:41 PM

## 2023-01-14 NOTE — PROGRESS NOTES
V2.0  [facility] Critical Care Progress Note      Name:  Yoselyn Courtney /Age/Sex: 1950  (67 y.o. female)   MRN & CSN:  [id number] & [id number] Encounter Date/Time: 2023 10:22 AM EST    Location:  -A PCP: Gail Costello Day: 5    Assessment and Plan:   Yoselyn Courtney is a 67 y.o. female who presents with Sepsis, unspecified organism ([address]    Problem list  Acute respiratory failure with hypoxia  Suspected aspiration pneumonia/pneumonitis  Evolving ARDS  Status post cardiopulmonary resuscitation for PEA arrest, ROSC within 5 minutes. Post resuscitation shock, vasopressor reliant  Encephalopathy post arrest, suspect anoxic injury  Acute kidney injury, currently oligoanuric  Chronic systolic diastolic heart failure, concurrent atrial fibrillation (chronic), PPM  Remote CABG  Bilateral pleural effusions (transudative, mixed cellularity), left pneumothorax following cardiopulmonary resuscitation 2023 (chest tube placed note airleak). Post procedure right pneumothorax, small bore chest tube management (absence of air leak)  GI bleeding, upper (suspected)  History Kellie en Y bypass, \"mass\" noted at GE junction (suspected inflammatory, hemorrhagic lesion \"unlikely\" malignant given time frame of appearance from serial imaging)  Hypothyroidism  ? ESBL Klebsiella, Enterococcal UTI        Neuro: intubated, sedated, on no sedation. Moves to noxious stimuli, does not follow commands. Cardio:  Septic shock on high-dose vasoactive agents, titrate off as tolerated to MAP > 65 mmHg. Resp: Hypoxic resp failure on significant ventilatory support for hypoxic respiratory failure. FiO2 100%, PEEP 10. dexamethasone per DEXA ARDS Trial and Nebulized milrinone 4mg q4hr as pulmonary vasodilator  GI: GIB vs necrosis. Persistently having dark coffee ground like emesis with black, swamp like consistency concerning for possible necrosis.  GI and bariatric surgery consulted wanting to follow conservative management at this time. NPO, Protonix BID. : severe acid base disturbance, adjusted with ventilator. Worsening sCr was previously normal 0.5. monitor electroltyes and replenish as needed  Heme:  Bleeding source very suspiciouos given finding in NGT. Type and screen and transfuse as needed. Hold DVT ppx at this time  ID: Septic shock, Pneumonia, aspiration. Remains on intravenous broad spectrum antibiotics, broaden to vancomycin and meropenem given acute illness. Follow up on repeat cultures and narrow as able. Endo: POC BG. -180 mg/dL. ISS as needed  MSK: No acute issues. Tubes/Lines/Drains:  ETT, NGT, CVC, A-line, Catalan. Code Status: DNR-CCA with plans for TVW       Diet Diet NPO   DVT Prophylaxis [] Lovenox, []  Heparin, [] SCDs, [] Ambulation,  [] Eliquis, [] Xarelto  [] Coumadin   Code Status DNR-CCA   Disposition From: Home   Expected Disposition: TBD  Estimated Date of Discharge: TBD  Patient requires continued admission due to respiratory failure   Surrogate Decision Maker/ POA Spouse     Subjective:      Patient seen and examined this morning. She remains critically ill on the ventilator. She is not following any commands. She does move her head to noxious stimuli, but does nothing else purposeful. Review of Systems:    Review of Systems    Unable to obtain secondary to intubation    Objective: Intake/Output Summary (Last 24 hours) at 1/14/2023 1022  Last data filed at 1/14/2023 0659  Gross per 24 hour   Intake --   Output 45 ml   Net -45 ml        Vitals:   Vitals:    01/14/23 1012   BP:    Pulse: (!) 128   Resp: 28   Temp:    SpO2: (!) 75%       Physical Exam:     General: NAD  Eyes: EOMI  ENT: neck supple  Cardiovascular: Atrial fibrillation with a rate in the 120s to 130s.   Respiratory: Rhonchorous throughout, on mechanical ventilation  Gastrointestinal: Soft, non tender  Genitourinary: no suprapubic tenderness  Musculoskeletal: No edema  Skin: warm, dry  Neuro: Sedated on ventilator. Psych: Unable to assess.      Medications:   Medications:    meropenem  1,000 mg IntraVENous Q12H    chlorhexidine  15 mL Mouth/Throat BID    dexamethasone  20 mg IntraVENous Daily    inhalation builder   Inhalation Q4H    vancomycin  750 mg IntraVENous Q18H    bumetanide  2 mg IntraVENous Once    [Held by provider] metoprolol  5 mg IntraVENous Q6H    pantoprazole  40 mg IntraVENous BID    [Held by provider] amiodarone  400 mg Oral Nightly    [Held by provider] atorvastatin  20 mg Oral Nightly    [Held by provider] ferrous sulfate  325 mg Oral Daily with breakfast    gabapentin  200 mg Oral Nightly    levothyroxine  75 mcg Oral Daily      Infusions:    sodium chloride      propofol Stopped (01/13/23 1700)    EPINEPHrine Stopped (01/14/23 0658)    sodium bicarbonate infusion 100 mL/hr at 01/13/23 2225    vasopressin 0.04 Units/min (01/14/23 0725)    norepinephrine 27 mcg/min (01/14/23 0643)     PRN Meds: sodium chloride, , PRN  fentanNYL, 50 mcg, Q1H PRN  potassium chloride, 20 mEq, PRN   Or  potassium chloride, 10 mEq, PRN  sodium phosphate IVPB, 10 mmol, PRN   Or  sodium phosphate IVPB, 15 mmol, PRN   Or  sodium phosphate IVPB, 20 mmol, PRN  prochlorperazine, 10 mg, Q6H PRN  albuterol sulfate HFA, 2 puff, Q6H PRN  acetaminophen, 650 mg, Q6H PRN   Or  acetaminophen, 650 mg, Q6H PRN        Labs      Recent Results (from the past 24 hour(s))   Blood gas, arterial    Collection Time: 01/13/23  7:15 PM   Result Value Ref Range    pH, Bld 7.31 (L) 7.34 - 7.45    pCO2, Arterial 30.0 (L) 32 - 45 MMHG    pO2, Arterial 181 (H) 75 - 100 MMHG    Base Excess 10 (H) 0 - 2.4    HCO3, Arterial 15.1 (L) 18 - 23 MMOL/L    CO2 Content 16.0 (L) 19 - 24 MMOL/L    O2 Sat 96.9 96 - 97 %    Carbon Monoxide, Blood 1.4 0 - 5 %    Methemoglobin, Arterial 1.3 <1.5 %   Lactic Acid    Collection Time: 01/13/23  8:13 PM   Result Value Ref Range    Lactate 13.6 (HH) 0.5 - 1.9 mMOL/L   Critical Care Panel    Collection Time: 01/13/23 8:13 PM   Result Value Ref Range    Sodium 139 135 - 145 MMOL/L    Potassium 4.5 3.5 - 5.1 MMOL/L    Chloride 100 99 - 110 mMol/L    CO2 15 (L) 21 - 32 MMOL/L    Anion Gap 24 (H) 4 - 16    BUN 15 6 - 23 MG/DL    Creatinine 1.0 0.6 - 1.1 MG/DL    Est, Glom Filt Rate 59 (L) >60 mL/min/1.73m2    Glucose 121 (H) 70 - 99 MG/DL    Calcium 7.3 (L) 8.3 - 10.6 MG/DL    Phosphorus 4.0 2.5 - 4.9 MG/DL    Magnesium 1.8 1.8 - 2.4 mg/dl   Hepatic Function Panel    Collection Time: 01/13/23  8:13 PM   Result Value Ref Range    Albumin 1.9 (L) 3.4 - 5.0 GM/DL    Total Bilirubin 1.4 (H) 0.0 - 1.0 MG/DL    Bilirubin, Direct 1.1 (H) 0.0 - 0.3 MG/DL    Bilirubin, Indirect 0.3 0 - 0.7 MG/DL    Alkaline Phosphatase 121 40 - 129 IU/L    AST 2,004 (H) 15 - 37 IU/L     (H) 10 - 40 U/L    Total Protein 3.4 (L) 6.4 - 8.2 GM/DL   Protime/INR & PTT    Collection Time: 01/13/23  8:13 PM   Result Value Ref Range    Protime 31.8 (H) 11.7 - 14.5 SECONDS    INR 2.44 INDEX    aPTT 43.6 (H) 25.1 - 37.1 SECONDS   TYPE AND SCREEN    Collection Time: 01/13/23  9:00 PM   Result Value Ref Range    ABO/Rh A POSITIVE     Antibody Screen NEGATIVE    POC CRITICAL CARE PROFILE    Collection Time: 01/13/23  9:09 PM   Result Value Ref Range    pH, Bld 7.19 (L) 7.34 - 7.45    pCO2, Arterial 40.2 32 - 45 MMHG    pO2, Arterial 117.3 (H) 75 - 100 MMHG    HCO3, Arterial 15.3 (L) 18 - 23 MMOL/L    Base Excess MINUS 0 - 2.4    Base Exc, Mixed 12.1 (H) 0 - 2.3    O2 Sat 97.4 (H) 96 - 97 %    CO2 17 (L) 21 - 32 MMOL/L    Sodium 141 138 - 146 MMOL/L    Potassium 4.5 3.5 - 4.5 MMOL/L    POC CALCIUM 1.04 (L) 1.12 - 1.32 MMOL/L    POC Glucose 109 (H) 70 - 99 MG/DL    Hematocrit 22.0 (L) 37 - 47 %    Hemoglobin 7.6 (L) 12.5 - 16.0 GM/DL    POC Chloride 104 98 - 109 MMOL/L    POC Creatinine 1.5 (H) 0.6 - 1.1 MG/DL    eGFR, POC 37 (L) >60 mL/min/1.73m2    Source: Arterial    Lactic Acid    Collection Time: 01/14/23 12:00 AM   Result Value Ref Range    Lactate 17.3 (HH) 0.5 - 1.9 mMOL/L   CBC    Collection Time: 01/14/23 12:02 AM   Result Value Ref Range    WBC 18.6 (H) 4.0 - 10.5 K/CU MM    RBC 2.10 (L) 4.2 - 5.4 M/CU MM    Hemoglobin 6.2 (LL) 12.5 - 16.0 GM/DL    Hematocrit 21.1 (L) 37 - 47 %    .5 (H) 78 - 100 FL    MCH 29.5 27 - 31 PG    MCHC 29.4 (L) 32.0 - 36.0 %    RDW 17.8 (H) 11.7 - 14.9 %    Platelets 124 (L) 847 - 440 K/CU MM    MPV 11.9 (H) 7.5 - 11.1 FL   POCT KUN-TB-FU-HCT-ABG    Collection Time: 01/14/23 12:10 AM   Result Value Ref Range    pH, Bld 7.20 (L) 7.34 - 7.45    pCO2, Arterial 36.3 32 - 45 MMHG    pO2, Arterial 135.1 (H) 75 - 100 MMHG    Base Exc, Mixed 12.7 (H) 0 - 2.3    Base Excess MINUS 0 - 2.4    HCO3, Arterial 14.2 (L) 18 - 23 MMOL/L    CO2 Content 15.3 (L) 19 - 24 MMOL/L    O2 Sat 98.4 (H) 96 - 97 %    Hematocrit 20.0 (L) 37 - 47 %    Hemoglobin 6.8 (LL) 12.5 - 16.0 GM/DL    POC CALCIUM 1.02 (L) 1.12 - 1.32 MMOL/L    Sodium 140 138 - 146 MMOL/L    Potassium 4.4 3.5 - 4.5 MMOL/L    Source: Arterial     POC Glucose 76 70 - 99 MG/DL   TYPE AND SCREEN    Collection Time: 01/14/23  1:23 AM   Result Value Ref Range    ABO/Rh A POSITIVE     Antibody Screen NEGATIVE     Unit Number [id number]     Component LEUKO-POOR RED CELLS     Unit Divison 00     Status ISSUED     Transfusion Status OK TO TRANSFUSE     Crossmatch Result COMPATIBLE    Blood Gas, Arterial    Collection Time: 01/14/23  6:00 AM   Result Value Ref Range    pH, Bld 7.33 (L) 7.34 - 7.45    pCO2, Arterial 29.0 (L) 32 - 45 MMHG    pO2, Arterial 184 (H) 75 - 100 MMHG    Base Excess 9 (H) 0 - 2.4    HCO3, Arterial 15.3 (L) 18 - 23 MMOL/L    CO2 Content 16.2 (L) 19 - 24 MMOL/L    O2 Sat 97.4 (H) 96 - 97 %    Carbon Monoxide, Blood 1.8 0 - 5 %    Methemoglobin, Arterial 1.0 <1.5 %    Comment O2 100%    POC CRITICAL CARE PROFILE    Collection Time: 01/14/23  6:06 AM   Result Value Ref Range    pH, Bld 7.29 (L) 7.34 - 7.45    pCO2, Arterial 36.3 32 - 45 MMHG    pO2, Arterial 216.0 (H) 75 - 100 MMHG    HCO3, Arterial 17.5 (L) 18 - 23 MMOL/L    Base Excess MINUS 0 - 2.4    Base Exc, Mixed 8.4 (H) 0 - 2.3    O2 Sat 99.7 (H) 96 - 97 %    CO2 19 (L) 21 - 32 MMOL/L    Sodium 137 (L) 138 - 146 MMOL/L    Potassium 4.4 3.5 - 4.5 MMOL/L    POC CALCIUM 1.05 (L) 1.12 - 1.32 MMOL/L    POC Glucose 115 (H) 70 - 99 MG/DL    Hematocrit 28.0 (L) 37 - 47 %    Hemoglobin 9.4 (L) 12.5 - 16.0 GM/DL    POC Chloride 102 98 - 109 MMOL/L    POC Creatinine 1.8 (H) 0.6 - 1.1 MG/DL    eGFR, POC 29 (L) >60 mL/min/1.73m2    Source: Arterial    Lactic Acid    Collection Time: 01/14/23  6:10 AM   Result Value Ref Range    Lactate 13.1 (HH) 0.5 - 1.9 mMOL/L   Vancomycin Level, Random    Collection Time: 01/14/23  6:10 AM   Result Value Ref Range    Vancomycin Rm 31.8 UG/ML    DOSE AMOUNT DOSE AMT. GIVEN - 750 mg     DOSE TIME DOSE TIME GIVEN - UNKNOWN    Protime/INR & PTT    Collection Time: 01/14/23  6:10 AM   Result Value Ref Range    Protime 40.3 (H) 11.7 - 14.5 SECONDS    INR 3.09 INDEX    aPTT 44.0 (H) 25.1 - 37.1 SECONDS   Critical Care Panel    Collection Time: 01/14/23  6:10 AM   Result Value Ref Range    Sodium 137 135 - 145 MMOL/L    Potassium 4.4 3.5 - 5.1 MMOL/L    Chloride 94 (L) 99 - 110 mMol/L    CO2 15 (L) 21 - 32 MMOL/L    Anion Gap 28 (H) 4 - 16    BUN 15 6 - 23 MG/DL    Creatinine 1.4 (H) 0.6 - 1.1 MG/DL    Est, Glom Filt Rate 40 (L) >60 mL/min/1.73m2    Glucose 111 (H) 70 - 99 MG/DL    Calcium 7.7 (L) 8.3 - 10.6 MG/DL    Phosphorus 4.1 2.5 - 4.9 MG/DL    Magnesium 1.8 1.8 - 2.4 mg/dl   Lactic Acid    Collection Time: 01/14/23  9:15 AM   Result Value Ref Range    Lactate 12.3 (HH) 0.5 - 1.9 mMOL/L   POCT Glucose    Collection Time: 01/14/23  9:29 AM   Result Value Ref Range    POC Glucose 74 70 - 99 MG/DL        Imaging/Diagnostics Last 24 Hours   XR ABDOMEN (KUB) (SINGLE AP VIEW)    Result Date: 1/13/2023  EXAMINATION: ONE SUPINE XRAY VIEW(S) OF THE ABDOMEN 1/13/2023 12:52 am COMPARISON: January 11, 2023.  HISTORY: ORDERING SYSTEM PROVIDED HISTORY: post arrest TECHNOLOGIST PROVIDED HISTORY: Reason for exam:->post arrest Reason for Exam: post arrest FINDINGS: There is partial visualization of a left chest wall pacer with retained cardiac pacing wires. Evaluation is suboptimal as only portions of the chest and only upper portions of the abdomen are seen. There appears to be pneumomediastinum, bilateral pneumothoraces, and questionable pneumoperitoneum. No displaced fractures are identified. 1. Suboptimal evaluation described above. 2. Probable pneumomediastinum and bilateral pneumothoraces are now present. 3. Questionable pneumoperitoneum. Consider follow-up CT for further evaluation if the patient is clinically stable. The findings were sent to the Radiology Results [id number] at 1:56 am on 1/13/2023 to be communicated to a licensed caregiver. XR CHEST PORTABLE    Result Date: 1/13/2023  EXAMINATION: ONE XRAY VIEW OF THE CHEST 1/13/2023 1:35 am COMPARISON: January 13, 2023 HISTORY: ORDERING SYSTEM PROVIDED HISTORY: chest tube placement TECHNOLOGIST PROVIDED HISTORY: Reason for exam:->chest tube placement Reason for Exam: chest tube placement FINDINGS: Interval placement of a left chest tube. Nasogastric tube terminates in the proximal stomach. The side port may be proximal to the GE junction. Endotracheal tube tip terminates 3.6 cm above the nicole. The left-sided pneumothorax has significantly decreased in size. A small pneumothorax persists. A right-sided pneumothorax is small to moderate in size. Diffuse airspace opacities are present. No definite pneumoperitoneum on the current exam. No displaced fractures. Status post sternotomy. 1. Endotracheal tube tip terminates 3.6 cm above the nicole. Nasogastric tube is within the proximal stomach. 2. Interval placement of a left chest tube with significant decrease in size of a now small left pneumothorax. 3. Small to moderate right pneumothorax.  4. Diffuse airspace opacities. 5. No definite pneumoperitoneum. XR CHEST PORTABLE    Result Date: 1/13/2023  EXAMINATION: ONE XRAY VIEW OF THE CHEST 1/13/2023 12:52 am COMPARISON: January 12, 2023 HISTORY: ORDERING SYSTEM PROVIDED HISTORY: post arrest TECHNOLOGIST PROVIDED HISTORY: Reason for exam:->post arrest Reason for Exam: post arrest FINDINGS: Endotracheal tube tip terminates 4.0 cm above the nicole. Nasogastric tube tip terminates at the GE junction. The side port is in the distal esophagus. Stable left chest wall MediPort. Retained cardiac pacing wires are seen. Cardiomediastinal silhouette is enlarged. Pneumomediastinum is suspected. There is a large left pneumothorax. A small to moderate right pneumothorax is also seen. Diffuse airspace opacities are noted. No new osseous abnormality. 1. Endotracheal tube tip terminates 4.0 cm above the nicole. 2. Nasogastric tube tip terminates at the GE junction. 3. Large left pneumothorax. 4. Small to moderate right pneumothorax. 5. Probable pneumomediastinum. 6. Diffuse airspace opacities. XR CHEST PORTABLE    Result Date: 1/13/2023  EXAMINATION: ONE XRAY VIEW OF THE CHEST 1/12/2023 11:37 pm COMPARISON: Chest 01/11/2023 2:48 a.m., CTA chest 01/11/2023 HISTORY: Short of breath FINDINGS: The cardiac silhouette is enlarged. The mediastinal and hilar silhouettes appear unremarkable. Increased opacity mid and lower right lung and similar opacity left lung base compared to prior study. Bilateral pleural effusion. Vascular engorgement and cephalization is demonstrated with bilateral peribronchial cuffing and perivascular haziness. No pneumothorax is seen. No acute osseous abnormality is identified. Stable sequela from heart valve surgery and left-sided pacemaker. Progression of congestive heart failure is most likely given the radiographic findings; pneumonia is also a consideration in areas of consolidation with pleural effusion. Cardiomegaly. Total critical care time 35 minutes excluding all billable procedures.     Electronically signed by MIA Pierce CNP on 1/14/2023 at 10:22 AM

## 2023-01-14 NOTE — PROGRESS NOTES
[address] [facility]  consulted by Dr. Steven Rodriguez for monitoring and adjustment. Indication for treatment: Vancomycin indication: HAP  Goal trough: Trough Goal: 15-20 mcg/mL  AUC/FLAQUITO: 400-600    Risk Factors for MRSA Identified:   Hospitalization within the past 90 days, Received IV antibiotics within the past 90 days    Pertinent Laboratory Values:   Temp Readings from Last 3 Encounters:   01/14/23 (!) 48.2 °F (9 °C)     Recent Labs     01/12/23  0500 01/13/23  0430 01/13/23 2013 01/14/23  0000 01/14/23  0002 01/14/23  0610 01/14/23  0915   WBC 21.6* 11.2*  --   --  18.6*  --   --    LACTATE  --   --    < > 17.3*  --  13.1* 12.3*    < > = values in this interval not displayed. Recent Labs     01/13/23  0430 01/13/23 2013 01/13/23  2109 01/14/23  0606 01/14/23  0610   BUN 13 15  --   --  15   CREATININE 0.9 1.0 1.5* 1.8* 1.4*       Estimated Creatinine Clearance: 31 mL/min (A) (based on SCr of 1.4 mg/dL (H)). Intake/Output Summary (Last 24 hours) at 1/14/2023 1111  Last data filed at 1/14/2023 0659  Gross per 24 hour   Intake --   Output 45 ml   Net -45 ml         Pertinent Cultures:   Date    Source    Results  1/10                            Nasal MRSA screen               Negative  01/13   Blood           No growth  01/13   Respiratory          In process  01/13   Urine           Candida    Vancomycin level:   RANDOM:    Recent Labs     01/12/23  0500 01/13/23  0430 01/14/23  0610   VANCORANDOM 9.5 4.9 31.8         Assessment:  HPI: 67 y.o. female s/p PEA. Patient intubated. Current problems: septic shock, ARDS, hypoxic respiratory failure. RISA, last SCR elevated at 1.4 (baseline 0.5), limited UOP data. Day(s) of therapy:  2 of 7 (restart)  Vancomycin concentration:   01/14 - 31.8, 3 hour level on 750mg q18h,     Plan:  RISA, last SCR elevated @1.4, above baseline of 0.5  Vanco level this am predicts an AUC above target on the current regimen of 750mg q18h.   Decrease to vancomycin 500mg ivpb starting tomorrow for a predicted AUC of 403 at steady state. Recheck the vanco level in 2 days. Pharmacy will continue to monitor patient and adjust therapy as indicated    Vivek 3 1/16 @06:00    Thank you for the consult. Mat Pena, [facility]  1/14/2023 11:11 AM

## 2023-01-14 NOTE — PLAN OF CARE
Problem: Discharge Planning  Goal: Discharge to home or other facility with appropriate resources  2023 183 by Rasheeda Riley RN  Outcome: Completed  2023 by Georges White RN  Outcome: Progressing     Problem: Pain  Goal: Verbalizes/displays adequate comfort level or baseline comfort level  2023 1832 by Rasheeda Riley RN  Outcome: Completed  2023 by Georges White RN  Outcome: Progressing     Problem: Risk for Elopement  Goal: Patient will not exit the unit/facility without proper excort  2023 1114 by Georges White RN  Outcome: Completed     Problem: Skin/Tissue Integrity  Goal: Absence of new skin breakdown  Description: 1. Monitor for areas of redness and/or skin breakdown  2. Assess vascular access sites hourly  3. Every 4-6 hours minimum:  Change oxygen saturation probe site  4. Every 4-6 hours:  If on nasal continuous positive airway pressure, respiratory therapy assess nares and determine need for appliance change or resting period.   2023 183 by Rasheeda Riley RN  Outcome: Completed  2023 by Georges White RN  Outcome: Progressing     Problem: Safety - Adult  Goal: Free from fall injury  2023 by Rasheeda Riley RN  Outcome: Completed  2023 by Georges White RN  Outcome: Progressing     Problem: Nutrition Deficit:  Goal: Optimize nutritional status  2023 183 by Rasheeda Riley RN  Outcome: Completed  Flowsheets (Taken 2023 1154 by Joelle Madden, MS, RD, LD)  Nutrient intake appropriate for improving, restoring, or maintaining nutritional needs: Recommend, monitor, and adjust tube feedings and TPN/PPN based on assessed needs  2023 111 by Georges White RN  Outcome: Progressing

## 2023-01-14 NOTE — PROGRESS NOTES
Comprehensive Nutrition Assessment    Type and Reason for Visit:  Consult    Nutrition Recommendations/Plan:   EN Order: Vital HP @ 20 ml/hr  Initiate TF when medically appropriate  Will closely monitor need for pressors, respiratory status, nutrition status, poc     Malnutrition Assessment:  Malnutrition Status: At risk for malnutrition (Comment) (01/12/23 7300)    Context:  Acute Illness       Nutrition Assessment:    pt now intubated, +2 pressors, +sodium bicarb ggt, +NGT, upper GIB (suspected) noted, dietitian consult for tube feed order and manage, pt currently NPO, will follow at high nutrition risk    Nutrition Related Findings:    elevated LFTs, lactate 12.3 Wound Type: None       Current Nutrition Intake & Therapies:    Average Meal Intake: NPO  Average Supplements Intake: NPO  Diet NPO    Anthropometric Measures:  Height: 5' 4.02\" (162.6 cm)  Ideal Body Weight (IBW): 120 lbs (55 kg)       Current Body Weight: 123 lb 14.4 oz (56.2 kg), 103.2 % IBW.  Weight Source: Bed Scale  Current BMI (kg/m2): 21.3  Usual Body Weight: 128 lb 12 oz (58.4 kg) ((9/27/22) 10/12/22-56.7 kg-office visits)  % Weight Change (Calculated): -3.1  Weight Adjustment For: No Adjustment                 BMI Categories: Underweight (BMI less than 22) age over 72    Estimated Daily Nutrient Needs:  Energy Requirements Based On: Kcal/kg  Weight Used for Energy Requirements: Current  Energy (kcal/day): 6749-8432 (25-30 kcal/kg)  Weight Used for Protein Requirements: Current  Protein (g/day):  (1.2-2.0 g/kg)  Method Used for Fluid Requirements: 1 ml/kcal    Nutrition Diagnosis:   Inadequate oral intake related to acute injury/trauma as evidenced by NPO or clear liquid status due to medical condition    Nutrition Interventions:   Food and/or Nutrient Delivery: Start Tube Feeding  Nutrition Education/Counseling: No recommendation at this time  Coordination of Nutrition Care: Continue to monitor while inpatient       Goals:  Previous Goal Met: Progressing toward Goal(s)  Goals: Initiate nutrition support, within 2 days       Nutrition Monitoring and Evaluation:   Behavioral-Environmental Outcomes: None Identified  Food/Nutrient Intake Outcomes: Enteral Nutrition Intake/Tolerance  Physical Signs/Symptoms Outcomes: Biochemical Data, GI Status, Hemodynamic Status, Fluid Status or Edema, Weight, Skin    Discharge Planning:     Too soon to determine     Lesley Tony [address], [address] [place] [address],   Contact: [phone]

## 2023-01-14 NOTE — PROGRESS NOTES
Family requesting . Called  on-call at the number provided by the , Krisitn Fowler at [phone]. No answer, voicemail picked up, voicemail was that for [facility]. Did not leave message, called  back and made aware.  Provided nursing call back number,  to relay message

## 2023-01-14 NOTE — DISCHARGE SUMMARY
Discharge Summary    Name:  Will Isidro /Age/Sex:   [de-identified]67 y.o. female)   MRN & CSN:  [id number] & [id number] Admission Date/Time: 1/10/2023 11:34 AM   Attending:  Ghada Lazo DO Discharging Physician: Ghada Yoon DO     Hospital Course:   Will Isidro is a 67 y.o.  female history of hematemesis, gastrointestinal bleeding, aspiration pneumonia/pneumonitis. The patient recently underwent evaluation for hematemesis through [facility], that evaluation included EGD which reportedly did not reveal any significant anatomical disturbance, CT of the chest which similarly was nonremarkable. Given the persistent hematemesis, the patient presented this facility for further evaluation, developed progressive worsening hypoxia and ultimately required transfer to critical care unit setting 2023. Given progressive decline respiratory status aside from initiation of antimicrobial therapy, patient ultimately required intubation. Patient subsequently noted to develop PEA arrest rapidly resuscitated from the same, left tension pneumothorax alleviated with chest tube insertion. Given hemodynamic instability, pressor agent support required very high doses. Subsequent oligoanuric renal failure ensued. Given encephalopathy in light of recent resuscitation from PEA arrest concern raised regarding possible anoxic brain injury. In light of the patient's very severely compromised status, the seeming unlikelihood of meaningful recovery to a state of independence and spite of the aggressive medical care is provided, the family ultimately decided to transition care to comfort measures only. The patient was subsequently withdrawn from invasive ventilatory support and pressor agent administration. Nursing staff noted on 2023 at 1541 hrs that the patient was apneic, pulseless, no perceptible blood pressure or heart tones and hence was pronounced dead at that time.       Consults this admission:  IP CONSULT TO IV TEAM  IP CONSULT TO GI  IP CONSULT TO CARDIOLOGY  IP CONSULT TO PHARMACY  PHARMACY TO DOSE VANCOMYCIN  IP CONSULT TO GENERAL SURGERY  PHARMACY TO DOSE VANCOMYCIN  IP CONSULT TO DIETITIAN  IP CONSULT TO NEPHROLOGY      Discharge Instruction:   None  Discharge Medications:     None  Objective Findings at Discharge:     Pulseless, no perceptible blood pressure, absence of cardiac tones, apneic  Recent Labs     01/12/23  0500 01/12/23  1728 01/13/23  0430 01/13/23 2013 01/13/23  2109 01/14/23  0002 01/14/23  0010 01/14/23  0606 01/14/23  0610      < > 146* 139 141  --  140 137* 137   K 3.3*   < > 4.1 4.5 4.5  --  4.4 4.4 4.4     --  111* 100  --   --   --   --  94*   CO2 21   < > 11* 15* 17*  --   --  19* 15*   BUN 8  --  13 15  --   --   --   --  15   CREATININE 0.5*   < > 0.9 1.0 1.5*  --   --  1.8* 1.4*   WBC 21.6*  --  11.2*  --   --  18.6*  --   --   --    HCT 27.2*   < > 29.0*  --  22.0* 21.1* 20.0* 28.0*  --      --  285  --   --  129*  --   --   --     < > = values in this interval not displayed. Final discharge diagnoses:    Acute respiratory failure with hypoxia  Suspected aspiration pneumonia/pneumonitis  Evolving ARDS  Status post cardiopulmonary resuscitation for PEA arrest, ROSC within 5 minutes. Post resuscitation shock, vasopressor reliant  Encephalopathy post arrest, suspect anoxic injury  Acute kidney injury, currently oligoanuric  Chronic systolic diastolic heart failure, concurrent atrial fibrillation (chronic), PPM  Remote CABG  Bilateral pleural effusions (transudative, mixed cellularity), left pneumothorax following cardiopulmonary resuscitation 1/12/2023 (chest tube placed note airleak).   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 ESBL Klebsiella, Enterococcal UTI         Discharge Time of less than 30 minutes    Electronically signed by Chelsi Hansen DO on 1/14/2023 at 3:46 PM

## 2023-01-15 LAB
ABO/RH: NORMAL
ANTIBODY SCREEN: NEGATIVE
COMPONENT: NORMAL
CROSSMATCH RESULT: NORMAL
CULTURE: NORMAL
Lab: NORMAL
MYCOPLASMA PNEUMONIAE IGG: 0.18 U/L
MYCOPLASMA PNEUMONIAE IGM: 0.19 U/L
SPECIMEN: NORMAL
STATUS: NORMAL
TRANSFUSION STATUS: NORMAL
UNIT DIVISION: 0
UNIT NUMBER: NORMAL

## 2023-01-16 LAB
CULTURE: NORMAL
GRAM SMEAR: NORMAL
Lab: NORMAL
SPECIMEN: NORMAL

## 2023-01-18 LAB
CULTURE: NORMAL
Lab: NORMAL
SPECIMEN: NORMAL
